# Patient Record
Sex: MALE | Race: WHITE | NOT HISPANIC OR LATINO
[De-identification: names, ages, dates, MRNs, and addresses within clinical notes are randomized per-mention and may not be internally consistent; named-entity substitution may affect disease eponyms.]

---

## 2017-01-06 ENCOUNTER — APPOINTMENT (OUTPATIENT)
Dept: CARDIOLOGY | Facility: CLINIC | Age: 62
End: 2017-01-06

## 2017-01-06 ENCOUNTER — NON-APPOINTMENT (OUTPATIENT)
Age: 62
End: 2017-01-06

## 2017-01-06 VITALS — SYSTOLIC BLOOD PRESSURE: 123 MMHG | DIASTOLIC BLOOD PRESSURE: 83 MMHG | HEART RATE: 88 BPM | OXYGEN SATURATION: 98 %

## 2017-01-06 DIAGNOSIS — Z01.818 ENCOUNTER FOR OTHER PREPROCEDURAL EXAMINATION: ICD-10-CM

## 2017-01-09 ENCOUNTER — OUTPATIENT (OUTPATIENT)
Dept: OUTPATIENT SERVICES | Facility: HOSPITAL | Age: 62
LOS: 1 days | End: 2017-01-09

## 2017-01-09 VITALS
RESPIRATION RATE: 16 BRPM | SYSTOLIC BLOOD PRESSURE: 152 MMHG | WEIGHT: 238.1 LBS | DIASTOLIC BLOOD PRESSURE: 90 MMHG | TEMPERATURE: 98 F | HEART RATE: 88 BPM | HEIGHT: 76 IN

## 2017-01-09 DIAGNOSIS — Z98.890 OTHER SPECIFIED POSTPROCEDURAL STATES: Chronic | ICD-10-CM

## 2017-01-09 DIAGNOSIS — M67.911 UNSPECIFIED DISORDER OF SYNOVIUM AND TENDON, RIGHT SHOULDER: ICD-10-CM

## 2017-01-09 LAB
BUN SERPL-MCNC: 22 MG/DL — SIGNIFICANT CHANGE UP (ref 7–23)
CALCIUM SERPL-MCNC: 9.6 MG/DL — SIGNIFICANT CHANGE UP (ref 8.4–10.5)
CHLORIDE SERPL-SCNC: 101 MMOL/L — SIGNIFICANT CHANGE UP (ref 98–107)
CO2 SERPL-SCNC: 28 MMOL/L — SIGNIFICANT CHANGE UP (ref 22–31)
CREAT SERPL-MCNC: 1 MG/DL — SIGNIFICANT CHANGE UP (ref 0.5–1.3)
GLUCOSE SERPL-MCNC: 100 MG/DL — HIGH (ref 70–99)
HCT VFR BLD CALC: 50.1 % — HIGH (ref 39–50)
HGB BLD-MCNC: 17.5 G/DL — HIGH (ref 13–17)
MCHC RBC-ENTMCNC: 31.9 PG — SIGNIFICANT CHANGE UP (ref 27–34)
MCHC RBC-ENTMCNC: 34.9 % — SIGNIFICANT CHANGE UP (ref 32–36)
MCV RBC AUTO: 91.4 FL — SIGNIFICANT CHANGE UP (ref 80–100)
PLATELET # BLD AUTO: 269 K/UL — SIGNIFICANT CHANGE UP (ref 150–400)
PMV BLD: 10.8 FL — SIGNIFICANT CHANGE UP (ref 7–13)
POTASSIUM SERPL-MCNC: 4.4 MMOL/L — SIGNIFICANT CHANGE UP (ref 3.5–5.3)
POTASSIUM SERPL-SCNC: 4.4 MMOL/L — SIGNIFICANT CHANGE UP (ref 3.5–5.3)
RBC # BLD: 5.48 M/UL — SIGNIFICANT CHANGE UP (ref 4.2–5.8)
RBC # FLD: 13.7 % — SIGNIFICANT CHANGE UP (ref 10.3–14.5)
SODIUM SERPL-SCNC: 142 MMOL/L — SIGNIFICANT CHANGE UP (ref 135–145)
WBC # BLD: 8.03 K/UL — SIGNIFICANT CHANGE UP (ref 3.8–10.5)
WBC # FLD AUTO: 8.03 K/UL — SIGNIFICANT CHANGE UP (ref 3.8–10.5)

## 2017-01-09 NOTE — H&P PST ADULT - HISTORY OF PRESENT ILLNESS
62 y/o  male with PMH: HTN, HLD presents to PST for pre op evaluation with hx of intermittent right shoulder pain x 2 years. Pt was referred to surgeon by PCP. Now scheduled for right shoulder arthroscopy rotator decompression on 01/24/17.

## 2017-01-09 NOTE — H&P PST ADULT - NEGATIVE BREAST SYMPTOMS
no breast lump L/no breast tenderness L/no breast lump R/no nipple discharge L/no breast tenderness R/no nipple discharge R

## 2017-01-09 NOTE — H&P PST ADULT - MUSCULOSKELETAL
details… detailed exam right shoulder/no calf tenderness/diminished strength/no joint swelling/no joint warmth/decreased ROM due to pain

## 2017-01-09 NOTE — H&P PST ADULT - NEGATIVE GASTROINTESTINAL SYMPTOMS
no nausea/no change in bowel habits/no abdominal pain/no melena/no diarrhea/no vomiting/no hiccoughs/no constipation

## 2017-01-09 NOTE — H&P PST ADULT - NEGATIVE GENERAL SYMPTOMS
no weight gain/no chills/no fever/no polydipsia/no weight loss/no polyphagia/no anorexia/no polyuria/no sweating/no fatigue no sweating/no weight gain/no weight loss/no anorexia/no chills/no polydipsia/no polyphagia/no polyuria/no fever/no malaise/no fatigue

## 2017-01-09 NOTE — H&P PST ADULT - NEGATIVE GENERAL GENITOURINARY SYMPTOMS
no gas in urine/no bladder infections/no urine discoloration/no flank pain L/no flank pain R/no hematuria/no renal colic no nocturia/no urinary hesitancy/no bladder infections/no urine discoloration/no renal colic/normal urinary frequency/no flank pain R/no gas in urine/no flank pain L/no hematuria/normal libido/no dysuria

## 2017-01-09 NOTE — H&P PST ADULT - RS GEN PE MLT RESP DETAILS PC
good air movement/no rales/airway patent/no intercostal retractions/no subcutaneous emphysema/respirations non-labored/no wheezes/no rhonchi/breath sounds equal/clear to auscultation bilaterally

## 2017-01-09 NOTE — H&P PST ADULT - NSANTHOSAYNRD_GEN_A_CORE
No. SHAN screening performed.  STOP BANG Legend: 0-2 = LOW Risk; 3-4 = INTERMEDIATE Risk; 5-8 = HIGH Risk

## 2017-01-09 NOTE — H&P PST ADULT - PROBLEM SELECTOR PLAN 1
Scheduled for right shoulder arthroscopy rotator decompression on 01/24/17. Pre op  instructions, famotidine, chlorhexidine gluconate soap given and explained. Pt verbalized understanding.

## 2017-01-09 NOTE — H&P PST ADULT - NEGATIVE OPHTHALMOLOGIC SYMPTOMS
no pain R/no loss of vision R/no loss of vision L/no diplopia/no photophobia/no lacrimation L/no lacrimation R/no blurred vision R/no pain L/no discharge L/no scleral injection L/no blurred vision L/no irritation L/no discharge R/no irritation R no pain R/no loss of vision L/no blurred vision R/no discharge L/no irritation L/no irritation R/no loss of vision R/no scleral injection R/no pain L/no lacrimation R/no scleral injection L/no discharge R/no photophobia/no blurred vision L/no lacrimation L/no diplopia

## 2017-01-09 NOTE — H&P PST ADULT - PMH
HTN (hypertension)    Hyperlipidemia HTN (hypertension)    Hyperlipidemia    Neck mass  posterior  Sprain of other specified parts of unspecified shoulder girdle, initial encounter    Unspecified disorder of synovium and tendon, right shoulder

## 2017-01-09 NOTE — H&P PST ADULT - NEGATIVE CARDIOVASCULAR SYMPTOMS
no claudication/no chest pain/no dyspnea on exertion/no palpitations/no orthopnea/no paroxysmal nocturnal dyspnea/no peripheral edema

## 2017-01-10 ENCOUNTER — TRANSCRIPTION ENCOUNTER (OUTPATIENT)
Age: 62
End: 2017-01-10

## 2017-01-24 ENCOUNTER — APPOINTMENT (OUTPATIENT)
Dept: ORTHOPEDIC SURGERY | Facility: AMBULATORY SURGERY CENTER | Age: 62
End: 2017-01-24

## 2017-01-24 ENCOUNTER — OUTPATIENT (OUTPATIENT)
Dept: OUTPATIENT SERVICES | Facility: HOSPITAL | Age: 62
LOS: 1 days | Discharge: HOME | End: 2017-01-24
Payer: MEDICAID

## 2017-01-24 VITALS
SYSTOLIC BLOOD PRESSURE: 123 MMHG | HEART RATE: 72 BPM | WEIGHT: 238.1 LBS | HEIGHT: 76 IN | DIASTOLIC BLOOD PRESSURE: 87 MMHG | TEMPERATURE: 98 F | RESPIRATION RATE: 16 BRPM | OXYGEN SATURATION: 99 %

## 2017-01-24 VITALS
OXYGEN SATURATION: 99 % | DIASTOLIC BLOOD PRESSURE: 61 MMHG | HEART RATE: 82 BPM | RESPIRATION RATE: 15 BRPM | SYSTOLIC BLOOD PRESSURE: 109 MMHG

## 2017-01-24 DIAGNOSIS — Z98.890 OTHER SPECIFIED POSTPROCEDURAL STATES: Chronic | ICD-10-CM

## 2017-01-24 DIAGNOSIS — M67.911 UNSPECIFIED DISORDER OF SYNOVIUM AND TENDON, RIGHT SHOULDER: ICD-10-CM

## 2017-01-24 PROCEDURE — 29827 SHO ARTHRS SRG RT8TR CUF RPR: CPT

## 2017-01-24 PROCEDURE — 29826 SHO ARTHRS SRG DECOMPRESSION: CPT

## 2017-01-24 RX ORDER — OXYCODONE AND ACETAMINOPHEN 5; 325 MG/1; MG/1
1 TABLET ORAL
Qty: 30 | Refills: 0
Start: 2017-01-24

## 2017-01-24 NOTE — ASU DISCHARGE PLAN (ADULT/PEDIATRIC). - NOTIFY
Unable to Urinate/Swelling that continues/Bleeding that does not stop/Fever greater than 101/Inability to Tolerate Liquids or Foods/Persistent Nausea and Vomiting/Pain not relieved by Medications/Numbness, color, or temperature change to extremity

## 2017-01-24 NOTE — ASU DISCHARGE PLAN (ADULT/PEDIATRIC). - SPECIAL INSTRUCTIONS
Increase fluid and fiber in diet   Take over the counter stool softener for possible constipation with pain medication   Don't take Tylenol with pain meds because there is Tylenol in percocet

## 2017-02-01 ENCOUNTER — APPOINTMENT (OUTPATIENT)
Dept: ORTHOPEDIC SURGERY | Facility: CLINIC | Age: 62
End: 2017-02-01

## 2017-02-01 VITALS — BODY MASS INDEX: 29.22 KG/M2 | WEIGHT: 240 LBS | HEIGHT: 76 IN

## 2017-02-01 PROBLEM — S43.80XA: Chronic | Status: ACTIVE | Noted: 2017-01-09

## 2017-02-01 PROBLEM — M67.911 UNSPECIFIED DISORDER OF SYNOVIUM AND TENDON, RIGHT SHOULDER: Chronic | Status: ACTIVE | Noted: 2017-01-09

## 2017-02-01 PROBLEM — E78.5 HYPERLIPIDEMIA, UNSPECIFIED: Chronic | Status: ACTIVE | Noted: 2017-01-09

## 2017-02-01 PROBLEM — I10 ESSENTIAL (PRIMARY) HYPERTENSION: Chronic | Status: ACTIVE | Noted: 2017-01-09

## 2017-02-01 PROBLEM — R22.1 LOCALIZED SWELLING, MASS AND LUMP, NECK: Chronic | Status: ACTIVE | Noted: 2017-01-09

## 2017-02-02 ENCOUNTER — APPOINTMENT (OUTPATIENT)
Dept: CARDIOLOGY | Facility: CLINIC | Age: 62
End: 2017-02-02

## 2017-02-17 ENCOUNTER — MESSAGE (OUTPATIENT)
Age: 62
End: 2017-02-17

## 2017-03-01 ENCOUNTER — APPOINTMENT (OUTPATIENT)
Dept: ORTHOPEDIC SURGERY | Facility: CLINIC | Age: 62
End: 2017-03-01

## 2017-04-12 ENCOUNTER — APPOINTMENT (OUTPATIENT)
Dept: ORTHOPEDIC SURGERY | Facility: CLINIC | Age: 62
End: 2017-04-12

## 2017-04-12 DIAGNOSIS — M67.911 UNSPECIFIED DISORDER OF SYNOVIUM AND TENDON, RIGHT SHOULDER: ICD-10-CM

## 2017-04-26 ENCOUNTER — MEDICATION RENEWAL (OUTPATIENT)
Age: 62
End: 2017-04-26

## 2017-04-27 ENCOUNTER — RX RENEWAL (OUTPATIENT)
Age: 62
End: 2017-04-27

## 2017-06-12 ENCOUNTER — APPOINTMENT (OUTPATIENT)
Dept: ORTHOPEDIC SURGERY | Facility: CLINIC | Age: 62
End: 2017-06-12

## 2017-08-14 ENCOUNTER — MEDICATION RENEWAL (OUTPATIENT)
Age: 62
End: 2017-08-14

## 2017-09-13 ENCOUNTER — APPOINTMENT (OUTPATIENT)
Dept: CARDIOLOGY | Facility: CLINIC | Age: 62
End: 2017-09-13
Payer: MEDICAID

## 2017-09-13 ENCOUNTER — NON-APPOINTMENT (OUTPATIENT)
Age: 62
End: 2017-09-13

## 2017-09-13 VITALS
SYSTOLIC BLOOD PRESSURE: 102 MMHG | OXYGEN SATURATION: 97 % | HEIGHT: 76 IN | DIASTOLIC BLOOD PRESSURE: 70 MMHG | HEART RATE: 75 BPM

## 2017-09-13 PROCEDURE — 99407 BEHAV CHNG SMOKING > 10 MIN: CPT

## 2017-09-13 PROCEDURE — 93000 ELECTROCARDIOGRAM COMPLETE: CPT

## 2017-09-13 PROCEDURE — 99215 OFFICE O/P EST HI 40 MIN: CPT | Mod: 25

## 2017-09-13 RX ORDER — IBUPROFEN 600 MG/1
600 TABLET, FILM COATED ORAL EVERY 8 HOURS
Qty: 21 | Refills: 0 | Status: DISCONTINUED | COMMUNITY
Start: 2017-01-27 | End: 2017-09-13

## 2017-09-22 ENCOUNTER — APPOINTMENT (OUTPATIENT)
Dept: CARDIOLOGY | Facility: CLINIC | Age: 62
End: 2017-09-22
Payer: MEDICAID

## 2017-09-22 PROCEDURE — 93978 VASCULAR STUDY: CPT

## 2017-11-28 ENCOUNTER — NON-APPOINTMENT (OUTPATIENT)
Age: 62
End: 2017-11-28

## 2017-11-28 ENCOUNTER — APPOINTMENT (OUTPATIENT)
Dept: CARDIOLOGY | Facility: CLINIC | Age: 62
End: 2017-11-28
Payer: MEDICAID

## 2017-11-28 VITALS
SYSTOLIC BLOOD PRESSURE: 140 MMHG | HEART RATE: 73 BPM | DIASTOLIC BLOOD PRESSURE: 90 MMHG | WEIGHT: 242 LBS | BODY MASS INDEX: 29.47 KG/M2 | OXYGEN SATURATION: 99 % | HEIGHT: 76 IN

## 2017-11-28 VITALS — DIASTOLIC BLOOD PRESSURE: 78 MMHG | SYSTOLIC BLOOD PRESSURE: 124 MMHG

## 2017-11-28 DIAGNOSIS — Z01.810 ENCOUNTER FOR PREPROCEDURAL CARDIOVASCULAR EXAMINATION: ICD-10-CM

## 2017-11-28 PROCEDURE — 99215 OFFICE O/P EST HI 40 MIN: CPT | Mod: 25

## 2017-11-28 PROCEDURE — 93000 ELECTROCARDIOGRAM COMPLETE: CPT | Mod: 59

## 2017-11-28 PROCEDURE — 99407 BEHAV CHNG SMOKING > 10 MIN: CPT

## 2017-12-05 ENCOUNTER — FORM ENCOUNTER (OUTPATIENT)
Age: 62
End: 2017-12-05

## 2017-12-06 ENCOUNTER — OUTPATIENT (OUTPATIENT)
Dept: OUTPATIENT SERVICES | Facility: HOSPITAL | Age: 62
LOS: 1 days | End: 2017-12-06
Payer: MEDICAID

## 2017-12-06 ENCOUNTER — APPOINTMENT (OUTPATIENT)
Dept: CT IMAGING | Facility: CLINIC | Age: 62
End: 2017-12-06
Payer: MEDICAID

## 2017-12-06 DIAGNOSIS — Z98.890 OTHER SPECIFIED POSTPROCEDURAL STATES: Chronic | ICD-10-CM

## 2017-12-06 DIAGNOSIS — F17.200 NICOTINE DEPENDENCE, UNSPECIFIED, UNCOMPLICATED: ICD-10-CM

## 2017-12-06 PROCEDURE — G0297: CPT | Mod: 26

## 2017-12-06 PROCEDURE — G0297: CPT

## 2018-05-09 ENCOUNTER — NON-APPOINTMENT (OUTPATIENT)
Age: 63
End: 2018-05-09

## 2018-05-09 ENCOUNTER — APPOINTMENT (OUTPATIENT)
Dept: CARDIOLOGY | Facility: CLINIC | Age: 63
End: 2018-05-09
Payer: MEDICAID

## 2018-05-09 VITALS
BODY MASS INDEX: 30.08 KG/M2 | WEIGHT: 247 LBS | HEIGHT: 76 IN | HEART RATE: 75 BPM | OXYGEN SATURATION: 98 % | DIASTOLIC BLOOD PRESSURE: 75 MMHG | SYSTOLIC BLOOD PRESSURE: 118 MMHG

## 2018-05-09 DIAGNOSIS — Z13.6 ENCOUNTER FOR SCREENING FOR CARDIOVASCULAR DISORDERS: ICD-10-CM

## 2018-05-09 PROCEDURE — 99407 BEHAV CHNG SMOKING > 10 MIN: CPT

## 2018-05-09 PROCEDURE — 99214 OFFICE O/P EST MOD 30 MIN: CPT | Mod: 25

## 2018-05-09 PROCEDURE — 93000 ELECTROCARDIOGRAM COMPLETE: CPT

## 2018-05-23 ENCOUNTER — APPOINTMENT (OUTPATIENT)
Dept: CARDIOLOGY | Facility: CLINIC | Age: 63
End: 2018-05-23

## 2018-08-10 ENCOUNTER — APPOINTMENT (OUTPATIENT)
Dept: CARDIOLOGY | Facility: CLINIC | Age: 63
End: 2018-08-10
Payer: MEDICAID

## 2018-08-10 PROCEDURE — 93306 TTE W/DOPPLER COMPLETE: CPT

## 2018-08-10 PROCEDURE — 93978 VASCULAR STUDY: CPT

## 2018-08-15 ENCOUNTER — APPOINTMENT (OUTPATIENT)
Dept: CARDIOLOGY | Facility: CLINIC | Age: 63
End: 2018-08-15
Payer: MEDICAID

## 2018-08-15 PROCEDURE — 93320 DOPPLER ECHO COMPLETE: CPT

## 2018-08-15 PROCEDURE — 93351 STRESS TTE COMPLETE: CPT

## 2018-08-15 PROCEDURE — 0439T MYOCRD CONTRAST PRFUJ ECHO: CPT | Mod: 59

## 2018-08-15 PROCEDURE — 93352 ADMIN ECG CONTRAST AGENT: CPT

## 2018-09-14 ENCOUNTER — APPOINTMENT (OUTPATIENT)
Dept: CARDIOLOGY | Facility: CLINIC | Age: 63
End: 2018-09-14

## 2018-10-30 ENCOUNTER — APPOINTMENT (OUTPATIENT)
Dept: CARDIOLOGY | Facility: CLINIC | Age: 63
End: 2018-10-30
Payer: MEDICAID

## 2018-10-30 ENCOUNTER — NON-APPOINTMENT (OUTPATIENT)
Age: 63
End: 2018-10-30

## 2018-10-30 VITALS
HEIGHT: 76 IN | OXYGEN SATURATION: 98 % | WEIGHT: 240 LBS | DIASTOLIC BLOOD PRESSURE: 76 MMHG | HEART RATE: 85 BPM | BODY MASS INDEX: 29.22 KG/M2 | SYSTOLIC BLOOD PRESSURE: 117 MMHG

## 2018-10-30 PROCEDURE — 99407 BEHAV CHNG SMOKING > 10 MIN: CPT

## 2018-10-30 PROCEDURE — 99215 OFFICE O/P EST HI 40 MIN: CPT | Mod: 25

## 2018-10-30 PROCEDURE — 93000 ELECTROCARDIOGRAM COMPLETE: CPT | Mod: 59

## 2019-03-28 ENCOUNTER — TRANSCRIPTION ENCOUNTER (OUTPATIENT)
Age: 64
End: 2019-03-28

## 2019-06-06 ENCOUNTER — RX RENEWAL (OUTPATIENT)
Age: 64
End: 2019-06-06

## 2019-07-16 ENCOUNTER — APPOINTMENT (OUTPATIENT)
Dept: CARDIOLOGY | Facility: CLINIC | Age: 64
End: 2019-07-16

## 2019-10-21 ENCOUNTER — MEDICATION RENEWAL (OUTPATIENT)
Age: 64
End: 2019-10-21

## 2019-11-08 ENCOUNTER — APPOINTMENT (OUTPATIENT)
Dept: CARDIOLOGY | Facility: CLINIC | Age: 64
End: 2019-11-08
Payer: MEDICAID

## 2019-11-08 ENCOUNTER — NON-APPOINTMENT (OUTPATIENT)
Age: 64
End: 2019-11-08

## 2019-11-08 VITALS
HEART RATE: 70 BPM | BODY MASS INDEX: 29.22 KG/M2 | OXYGEN SATURATION: 100 % | WEIGHT: 240 LBS | HEIGHT: 76 IN | SYSTOLIC BLOOD PRESSURE: 129 MMHG | DIASTOLIC BLOOD PRESSURE: 82 MMHG

## 2019-11-08 PROCEDURE — 99407 BEHAV CHNG SMOKING > 10 MIN: CPT

## 2019-11-08 PROCEDURE — 93000 ELECTROCARDIOGRAM COMPLETE: CPT

## 2019-11-08 PROCEDURE — 99215 OFFICE O/P EST HI 40 MIN: CPT | Mod: 25

## 2019-11-08 NOTE — ADDENDUM
[FreeTextEntry1] : Mr. Virgen is "ok" to proceed for surgery without carotid Duplex.  \par No further cardiovascular work up is needed prior to the surgery.\par thank you\par Luis F Hernandes MD 1/20/2017 7:54 pm

## 2019-11-08 NOTE — CARDIOLOGY SUMMARY
[No Ischemia] : no Ischemia [No Symptoms] : no Symptoms [LVEF ___%] : LVEF [unfilled]% [___] : [unfilled] [Normal] : normal LA size [None] : no pulmonary hypertension [Mild] : mild mitral regurgitation [de-identified] : carotid Dupelx: 2017: moderate ahtersclerosis. \par \par Aug 2018: AAA screening: No aneurysm. mild plaque.

## 2019-11-08 NOTE — PHYSICAL EXAM
[General Appearance - Well Developed] : well developed [Normal Appearance] : normal appearance [Well Groomed] : well groomed [No Deformities] : no deformities [General Appearance - Well Nourished] : well nourished [General Appearance - In No Acute Distress] : no acute distress [Eyelids - No Xanthelasma] : the eyelids demonstrated no xanthelasmas [Normal Conjunctiva] : the conjunctiva exhibited no abnormalities [Normal Oral Mucosa] : normal oral mucosa [No Oral Pallor] : no oral pallor [No Oral Cyanosis] : no oral cyanosis [Normal Jugular Venous A Waves Present] : normal jugular venous A waves present [Normal Jugular Venous V Waves Present] : normal jugular venous V waves present [No Jugular Venous Donato A Waves] : no jugular venous donato A waves [Respiration, Rhythm And Depth] : normal respiratory rhythm and effort [Exaggerated Use Of Accessory Muscles For Inspiration] : no accessory muscle use [Heart Rate And Rhythm] : heart rate and rhythm were normal [Auscultation Breath Sounds / Voice Sounds] : lungs were clear to auscultation bilaterally [Heart Sounds] : normal S1 and S2 [Abdomen Soft] : soft [Abdomen Tenderness] : non-tender [Abdomen Mass (___ Cm)] : no abdominal mass palpated [Abnormal Walk] : normal gait [Gait - Sufficient For Exercise Testing] : the gait was sufficient for exercise testing [Nail Clubbing] : no clubbing of the fingernails [Cyanosis, Localized] : no localized cyanosis [Petechial Hemorrhages (___cm)] : no petechial hemorrhages [Skin Color & Pigmentation] : normal skin color and pigmentation [] : no rash [No Venous Stasis] : no venous stasis [Skin Lesions] : no skin lesions [No Skin Ulcers] : no skin ulcer [No Xanthoma] : no  xanthoma was observed [FreeTextEntry1] : sebaceous cyst on the neck posteriorly.  [Affect] : the affect was normal [Oriented To Time, Place, And Person] : oriented to person, place, and time [Mood] : the mood was normal [No Anxiety] : not feeling anxious

## 2019-11-08 NOTE — REASON FOR VISIT
[Follow-Up - Clinic] : a clinic follow-up of [FreeTextEntry2] : carotid disease, non obstructive CAD,  [FreeTextEntry1] : carotid disease, non obstructive CAD,

## 2019-11-08 NOTE — HISTORY OF PRESENT ILLNESS
[FreeTextEntry1] : F/u:  HTN, Non obstructive CAD, HLD, smoking addition, \par HPI for today: : feels good. Still smokes 1 pack a day,   he went back to 1 pack a day\par No chest pain. no headaches. No dizziness. no dyspnea. \par \par \par old note: cut down to 4-5 /day. Changing his habits related to smoking,.\par No chest pain. + cough. Bronchnitis symptoms.\par complains of dizziness. \par \par \par \par old note: smoking is less. but still smokes.  its 10-13 day. \par No chest pain. + dizziness when he stands up quickly. \par Some dyspnea on exertion. but similar exercsie capacity. \par \par old note: Recent right shoulder surery done.  tolerated surgery well. 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 patient states his BP has been always in normal range throughout his life.

## 2019-11-08 NOTE — DISCUSSION/SUMMARY
[Patient] : the patient [Risks] : risks [Benefits] : benefits [___ Month(s)] : [unfilled] month(s) [With Me] : with me [FreeTextEntry1] : This is a 59 M with h/o dyslipidemia, smoking, recent diagnosis of hypertension, complains of dyspnea on exertion.\par 1) HTN with LVH and grade 1 DD.: Likely essential. controlled Continue diet and exercise.  ct valsartan- HCTZ  to 80-12.5 \par 2) Non obstructive CAD: ASA 81 and lipitor 20 mg . next visit Lipid panel. carotid duplex\par 3) moderate atherosclerosis of carotid: ASA  and  statins. \par 4) AAA screening: Smoking. 40 pack year history:  1 pack a day since age 18 , US aorta: mild diffuse heterogenous plaque. no dilatiomn. \par 5) Smoking cessation:  still smoking. trying to quit. not wearing patches.   time spent > 10 mins Smoking cessation counselling addressed. Quit date not set. .  Patient motivated. Offered patches and/or meds. will start wellbutrins 100 mg > f/u in 3-4 weeks to assess imporvement. if do0es not tolerate then stop it and f/u in 9 mths. \par 6)  mild aortic stensois. \par

## 2019-11-27 ENCOUNTER — MEDICATION RENEWAL (OUTPATIENT)
Age: 64
End: 2019-11-27

## 2019-12-06 ENCOUNTER — MEDICATION RENEWAL (OUTPATIENT)
Age: 64
End: 2019-12-06

## 2019-12-13 ENCOUNTER — APPOINTMENT (OUTPATIENT)
Dept: CARDIOLOGY | Facility: CLINIC | Age: 64
End: 2019-12-13

## 2020-01-11 ENCOUNTER — RX RENEWAL (OUTPATIENT)
Age: 65
End: 2020-01-11

## 2020-08-15 ENCOUNTER — TRANSCRIPTION ENCOUNTER (OUTPATIENT)
Age: 65
End: 2020-08-15

## 2020-11-02 ENCOUNTER — RX RENEWAL (OUTPATIENT)
Age: 65
End: 2020-11-02

## 2021-01-06 NOTE — ASU PREOPERATIVE ASSESSMENT, ADULT ([facility] ONLY) - RESPIRATORY RATE (BREATHS/MIN)
"Chief Complaint   Patient presents with   • Follow-up   • Sleeping Problem         Subjective   Myke Marcial is a 54 y.o. male.     History of Present Illness   Patient comes back today for follow up of Obstructive Sleep apnea. he doesn't report any issues with the device or mask.     Patient says that he is compliant with his device and using it regularly.    Patient's symptoms of sleep disturbance has been helped some however this time of the year he always seems to have more difficulty sleeping due to just not being able to shut his mind off.    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    He does feel like the increased pressure from 12-14 has helped.  He uses a full facemask.      The following portions of the patient's history were reviewed and updated as appropriate: allergies, current medications, past family history, past medical history, past social history and past surgical history.    Review of Systems   Constitutional: Negative for chills and fever.   HENT: Negative for rhinorrhea, sinus pressure, sneezing and sore throat.    Respiratory: Positive for cough (occasionally when he first wakes up ). Negative for chest tightness, shortness of breath and wheezing.    Psychiatric/Behavioral: Positive for sleep disturbance.       Objective   Visit Vitals  /86   Pulse 96   Temp 97.3 °F (36.3 °C)   Resp 16   Ht 175.3 cm (69\")   Wt 122 kg (270 lb)   SpO2 97%   BMI 39.87 kg/m²         Physical Exam  Vitals signs reviewed.   Constitutional:       Appearance: He is well-developed.   HENT:      Head: Atraumatic.      Mouth/Throat:      Mouth: Mucous membranes are moist.      Comments: Crowded oropharynx.  Eyes:      Extraocular Movements: Extraocular movements intact.   Abdominal:      Comments: Obese abdomen.   Musculoskeletal:      Comments: Gait normal.   Neurological:      Mental " Status: He is alert and oriented to person, place, and time.           Assessment/Plan   Diagnoses and all orders for this visit:    1. Obstructive sleep apnea (Primary)    2. Excessive daytime sleepiness    3. Obesity (BMI 30-39.9)    4. Poor sleep hygiene           Return in about 10 years (around 1/6/2031) for Recheck, For Dr. Nazario, Sleep ONLY.    DISCUSSION (if any):  Continue treatment with CPAP at a pressure of 14, with a full-face mask.    Patient's compliance data was reviewed and he is not compliant at this time.     Humidification setup, hose and mask care discussed.    Weight loss advised.    Use every night for at least 4 hours stressed.      Dictated utilizing Dragon dictation.    This document was electronically signed by TAE Roger January 6, 2021  13:30 EST    16

## 2021-01-22 ENCOUNTER — APPOINTMENT (OUTPATIENT)
Dept: CARDIOLOGY | Facility: CLINIC | Age: 66
End: 2021-01-22
Payer: MEDICARE

## 2021-01-22 ENCOUNTER — NON-APPOINTMENT (OUTPATIENT)
Age: 66
End: 2021-01-22

## 2021-01-22 VITALS — DIASTOLIC BLOOD PRESSURE: 81 MMHG | SYSTOLIC BLOOD PRESSURE: 119 MMHG

## 2021-01-22 VITALS — SYSTOLIC BLOOD PRESSURE: 135 MMHG | DIASTOLIC BLOOD PRESSURE: 85 MMHG

## 2021-01-22 VITALS — HEART RATE: 77 BPM | SYSTOLIC BLOOD PRESSURE: 143 MMHG | DIASTOLIC BLOOD PRESSURE: 81 MMHG | OXYGEN SATURATION: 97 %

## 2021-01-22 PROCEDURE — 99215 OFFICE O/P EST HI 40 MIN: CPT

## 2021-01-22 PROCEDURE — 93000 ELECTROCARDIOGRAM COMPLETE: CPT

## 2021-01-22 PROCEDURE — 99407 BEHAV CHNG SMOKING > 10 MIN: CPT

## 2021-01-22 PROCEDURE — 99072 ADDL SUPL MATRL&STAF TM PHE: CPT

## 2021-01-22 RX ORDER — NICOTINE TRANSDERMAL SYSTEM 7 MG/24H
7 PATCH, EXTENDED RELEASE TRANSDERMAL DAILY
Qty: 30 | Refills: 0 | Status: DISCONTINUED | COMMUNITY
Start: 2017-01-06 | End: 2021-01-22

## 2021-01-22 RX ORDER — NICOTINE TRANSDERMAL SYSTEM 14 MG/24H
14 PATCH, EXTENDED RELEASE TRANSDERMAL DAILY
Qty: 14 | Refills: 0 | Status: DISCONTINUED | COMMUNITY
Start: 2017-01-06 | End: 2021-01-22

## 2021-01-22 RX ORDER — BUPROPION HYDROCHLORIDE 100 MG/1
100 TABLET, FILM COATED, EXTENDED RELEASE ORAL DAILY
Qty: 90 | Refills: 2 | Status: DISCONTINUED | COMMUNITY
Start: 2019-11-08 | End: 2021-01-22

## 2021-01-22 NOTE — DISCUSSION/SUMMARY
[Patient] : the patient [Risks] : risks [Benefits] : benefits [___ Month(s)] : [unfilled] month(s) [With Me] : with me [FreeTextEntry1] : This is a 59 M with h/o dyslipidemia, smoking, recent diagnosis of hypertension, complains of dyspnea on exertion.\par 1) HTN with LVH and grade 1 DD.: Likely essential. controlled Continue diet and exercise.  ct valsartan- HCTZ  to 80-12.5  1 week blood pressure readnio and reassess for incresing meds. \par 2) Non obstructive CAD: but new LBBB : nuclear stress test.  ASA 81 and lipitor 20 mg .  \par 3) moderate atherosclerosis of carotid: ASA  and  statins.  repeat US carotid Artery. \par 4) AAA screening:   US aorta: mild diffuse heterogenous plaque. no dilatiomn. \par 5) Smoking cessation:  still smoking. trying to quit. not wearing patches.   time spent > 10 mins Smoking cessation counselling addressed. Quit date not set. .  Patient motivated. Offered patches and/or meds. will   f/u in 3-4  next visit. \par 6)  mild aortic stensois. : repeat echo. \par \par \par

## 2021-01-22 NOTE — CARDIOLOGY SUMMARY
[No Ischemia] : no Ischemia [No Symptoms] : no Symptoms [LVEF ___%] : LVEF [unfilled]% [___] : [unfilled] [None] : no pulmonary hypertension [Normal] : normal LA size [Mild] : mild mitral regurgitation [de-identified] : carotid Dupelx: 2017: moderate ahtersclerosis. \par \par Aug 2018: AAA screening: No aneurysm. mild plaque.

## 2021-01-22 NOTE — HISTORY OF PRESENT ILLNESS
[FreeTextEntry1] : F/u:  HTN, Non obstructive CAD, HLD, smoking addition, \par \par HPI for today: :  jayjay good. complaitn with meds.  he smokes 5-6 a day. he tries to eat healthy food.  he is phsycially activie\par chronic condictions: hypertension : controlled. \par coronary artery disease : stable. \par \par old note: : feels good. Still smokes 1 pack a day,   he went back to 1 pack a day\par No chest pain. no headaches. No dizziness. no dyspnea. \par \par \par old note: cut down to 4-5 /day. Changing his habits related to smoking,.\par No chest pain. + cough. Bronchnitis symptoms.\par complains of dizziness. \par \par \par \par old note: smoking is less. but still smokes.  its 10-13 day. \par No chest pain. + dizziness when he stands up quickly. \par Some dyspnea on exertion. but similar exercsie capacity. \par \par old note: Recent right shoulder surery done.  tolerated surgery well. 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 patient states his BP has been always in normal range throughout his life.

## 2021-01-22 NOTE — PHYSICAL EXAM
[General Appearance - Well Developed] : well developed [Normal Appearance] : normal appearance [Well Groomed] : well groomed [General Appearance - Well Nourished] : well nourished [No Deformities] : no deformities [General Appearance - In No Acute Distress] : no acute distress [Normal Conjunctiva] : the conjunctiva exhibited no abnormalities [Eyelids - No Xanthelasma] : the eyelids demonstrated no xanthelasmas [Normal Oral Mucosa] : normal oral mucosa [No Oral Pallor] : no oral pallor [No Oral Cyanosis] : no oral cyanosis [Normal Jugular Venous A Waves Present] : normal jugular venous A waves present [Normal Jugular Venous V Waves Present] : normal jugular venous V waves present [No Jugular Venous Donato A Waves] : no jugular venous donato A waves [Respiration, Rhythm And Depth] : normal respiratory rhythm and effort [Exaggerated Use Of Accessory Muscles For Inspiration] : no accessory muscle use [Auscultation Breath Sounds / Voice Sounds] : lungs were clear to auscultation bilaterally [Heart Rate And Rhythm] : heart rate and rhythm were normal [Heart Sounds] : normal S1 and S2 [Abdomen Tenderness] : non-tender [Abdomen Soft] : soft [Abdomen Mass (___ Cm)] : no abdominal mass palpated [Abnormal Walk] : normal gait [Gait - Sufficient For Exercise Testing] : the gait was sufficient for exercise testing [Nail Clubbing] : no clubbing of the fingernails [Cyanosis, Localized] : no localized cyanosis [Petechial Hemorrhages (___cm)] : no petechial hemorrhages [Skin Color & Pigmentation] : normal skin color and pigmentation [] : no rash [No Venous Stasis] : no venous stasis [Skin Lesions] : no skin lesions [No Skin Ulcers] : no skin ulcer [No Xanthoma] : no  xanthoma was observed [FreeTextEntry1] : sebaceous cyst on the neck posteriorly.  [Oriented To Time, Place, And Person] : oriented to person, place, and time [Affect] : the affect was normal [Mood] : the mood was normal [No Anxiety] : not feeling anxious

## 2021-02-18 ENCOUNTER — APPOINTMENT (OUTPATIENT)
Dept: CARDIOLOGY | Facility: CLINIC | Age: 66
End: 2021-02-18
Payer: MEDICARE

## 2021-02-18 PROCEDURE — 93880 EXTRACRANIAL BILAT STUDY: CPT

## 2021-02-18 PROCEDURE — 93306 TTE W/DOPPLER COMPLETE: CPT

## 2021-02-18 PROCEDURE — 99072 ADDL SUPL MATRL&STAF TM PHE: CPT

## 2021-02-23 ENCOUNTER — APPOINTMENT (OUTPATIENT)
Dept: CARDIOLOGY | Facility: CLINIC | Age: 66
End: 2021-02-23
Payer: MEDICARE

## 2021-02-23 PROCEDURE — 93015 CV STRESS TEST SUPVJ I&R: CPT

## 2021-02-23 PROCEDURE — A9500: CPT

## 2021-02-23 PROCEDURE — 78452 HT MUSCLE IMAGE SPECT MULT: CPT

## 2021-02-23 PROCEDURE — 99072 ADDL SUPL MATRL&STAF TM PHE: CPT

## 2021-02-25 ENCOUNTER — TRANSCRIPTION ENCOUNTER (OUTPATIENT)
Age: 66
End: 2021-02-25

## 2021-03-19 NOTE — ASU DISCHARGE PLAN (ADULT/PEDIATRIC). - DIET
Please call the patient with the results. Notify that I would recommend follow-up spine ortho due to marrow inflammation at T11-T12 and possibility of ankylosing spondylitis which is an inflammatory arthritis. I would recommend checking labs to try to help confirm this. Lab order placed. Further work-up and management as dictated by results.    Drink plenty fluids/progress slowly

## 2021-07-28 ENCOUNTER — APPOINTMENT (OUTPATIENT)
Dept: CARDIOLOGY | Facility: CLINIC | Age: 66
End: 2021-07-28
Payer: MEDICARE

## 2021-07-28 VITALS
OXYGEN SATURATION: 98 % | DIASTOLIC BLOOD PRESSURE: 67 MMHG | WEIGHT: 239 LBS | TEMPERATURE: 98.3 F | HEIGHT: 76 IN | RESPIRATION RATE: 18 BRPM | BODY MASS INDEX: 29.1 KG/M2 | SYSTOLIC BLOOD PRESSURE: 122 MMHG | HEART RATE: 67 BPM

## 2021-07-28 PROCEDURE — 99072 ADDL SUPL MATRL&STAF TM PHE: CPT

## 2021-07-28 PROCEDURE — 93000 ELECTROCARDIOGRAM COMPLETE: CPT

## 2021-07-28 PROCEDURE — 99215 OFFICE O/P EST HI 40 MIN: CPT

## 2021-07-29 ENCOUNTER — RX CHANGE (OUTPATIENT)
Age: 66
End: 2021-07-29

## 2021-07-29 NOTE — DISCUSSION/SUMMARY
[Patient] : the patient [Risks] : risks [Benefits] : benefits [With Me] : with me [___ Month(s)] : in [unfilled] month(s) [FreeTextEntry1] : This is a 59 M with h/o dyslipidemia, smoking, recent diagnosis of hypertension, complains of dyspnea on exertion.\par 1) HTN with LVH and grade 1 DD.: Likely essential. controlled Continue diet and exercise.  ct valsartan- HCTZ  to 80-12.5   \par 2) Non obstructive CAD: but new LBBB : nuclear stress test.  ASA 81 and lipitor 20 mg .  \par 3) moderate atherosclerosis of carotid: ASA  and  statins.  repeat US carotid Artery. \par 4) AAA screening:   US aorta: mild diffuse heterogenous plaque. no dilatiomn. \par 5) Smoking cessation:  still smoking. trying to quit. not wearing patches.   time spent > 10 mins Smoking cessation counselling addressed. Quit date not set. .  Patient motivated. Offered patches and/or meds. will   f/u in 3-4  next visit. \par 6)  mild aortic stensois. : repeat echo. \par az\par \par \par

## 2021-07-29 NOTE — REASON FOR VISIT
[Follow-Up - Clinic] : a clinic follow-up of [FreeTextEntry1] : carotid disease, non obstructive CAD,  [FreeTextEntry2] : carotid disease, non obstructive CAD,

## 2021-07-29 NOTE — HISTORY OF PRESENT ILLNESS
[FreeTextEntry1] : F/u:  HTN, Non obstructive CAD, HLD, smoking addition,  \par \par HPI for today: : he is still smoking. 1 pack a day a he is eating healthy. some dizziness when he gets up quickly.\par cramps in the legs.\par \par old noteL:  :  fels good. complaitn with meds.  he smokes 5-6 a day. he tries to eat healthy food.  he is phsycially activie\par chronic condictions: hypertension : controlled. \par coronary artery disease : stable. \par \par old note: : feels good. Still smokes 1 pack a day,   he went back to 1 pack a day\par No chest pain. no headaches. No dizziness. no dyspnea. \par \par \par old note: cut down to 4-5 /day. Changing his habits related to smoking,.\par No chest pain. + cough. Bronchnitis symptoms.\par complains of dizziness. \par \par \par \par old note: smoking is less. but still smokes.  its 10-13 day. \par No chest pain. + dizziness when he stands up quickly. \par Some dyspnea on exertion. but similar exercsie capacity. \par \par old note: Recent right shoulder surery done.  tolerated surgery well. 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 patient states his BP has been always in normal range throughout his life.

## 2021-07-29 NOTE — PHYSICAL EXAM
[General Appearance - Well Developed] : well developed [Normal Appearance] : normal appearance [Well Groomed] : well groomed [General Appearance - Well Nourished] : well nourished [No Deformities] : no deformities [General Appearance - In No Acute Distress] : no acute distress [Normal Conjunctiva] : the conjunctiva exhibited no abnormalities [Eyelids - No Xanthelasma] : the eyelids demonstrated no xanthelasmas [Normal Oral Mucosa] : normal oral mucosa [No Oral Pallor] : no oral pallor [Normal Jugular Venous A Waves Present] : normal jugular venous A waves present [No Oral Cyanosis] : no oral cyanosis [Normal Jugular Venous V Waves Present] : normal jugular venous V waves present [No Jugular Venous Donato A Waves] : no jugular venous donato A waves [Respiration, Rhythm And Depth] : normal respiratory rhythm and effort [Auscultation Breath Sounds / Voice Sounds] : lungs were clear to auscultation bilaterally [Exaggerated Use Of Accessory Muscles For Inspiration] : no accessory muscle use [Heart Rate And Rhythm] : heart rate and rhythm were normal [Heart Sounds] : normal S1 and S2 [Abdomen Soft] : soft [Abdomen Tenderness] : non-tender [Abdomen Mass (___ Cm)] : no abdominal mass palpated [Abnormal Walk] : normal gait [Gait - Sufficient For Exercise Testing] : the gait was sufficient for exercise testing [Nail Clubbing] : no clubbing of the fingernails [Cyanosis, Localized] : no localized cyanosis [Petechial Hemorrhages (___cm)] : no petechial hemorrhages [Skin Color & Pigmentation] : normal skin color and pigmentation [] : no rash [No Venous Stasis] : no venous stasis [Skin Lesions] : no skin lesions [No Skin Ulcers] : no skin ulcer [No Xanthoma] : no  xanthoma was observed [Oriented To Time, Place, And Person] : oriented to person, place, and time [Mood] : the mood was normal [Affect] : the affect was normal [No Anxiety] : not feeling anxious [FreeTextEntry1] : sebaceous cyst on the neck posteriorly.

## 2021-07-29 NOTE — CARDIOLOGY SUMMARY
[No Ischemia] : no Ischemia [No Symptoms] : no Symptoms [LVEF ___%] : LVEF [unfilled]% [___] : [unfilled] [None] : no pulmonary hypertension [Normal] : normal LA size [Mild] : mild mitral regurgitation [de-identified] : 7/28/2021  [de-identified] : 2/2021: No ischemia. Nuclear dann stress echo.  [de-identified] : feb 2021:  Normal LVef 55-60%.  aortic calcification. mild aortic  stenosis.  [de-identified] : Carotid DUplex: moderate atherosclerosis. No stenosis.  [de-identified] : carotid Dupelx: 2017: moderate ahtersclerosis. \par \par Aug 2018: AAA screening: No aneurysm. mild plaque.

## 2021-08-25 ENCOUNTER — APPOINTMENT (OUTPATIENT)
Dept: CARDIOLOGY | Facility: CLINIC | Age: 66
End: 2021-08-25

## 2021-10-31 ENCOUNTER — RX CHANGE (OUTPATIENT)
Age: 66
End: 2021-10-31

## 2022-01-26 ENCOUNTER — NON-APPOINTMENT (OUTPATIENT)
Age: 67
End: 2022-01-26

## 2022-01-26 ENCOUNTER — APPOINTMENT (OUTPATIENT)
Dept: CARDIOLOGY | Facility: CLINIC | Age: 67
End: 2022-01-26
Payer: MEDICARE

## 2022-01-26 VITALS
DIASTOLIC BLOOD PRESSURE: 73 MMHG | RESPIRATION RATE: 17 BRPM | BODY MASS INDEX: 29.22 KG/M2 | SYSTOLIC BLOOD PRESSURE: 132 MMHG | OXYGEN SATURATION: 95 % | TEMPERATURE: 98 F | HEIGHT: 76 IN | WEIGHT: 240 LBS | HEART RATE: 74 BPM

## 2022-01-26 PROCEDURE — 93000 ELECTROCARDIOGRAM COMPLETE: CPT

## 2022-01-26 PROCEDURE — 99215 OFFICE O/P EST HI 40 MIN: CPT

## 2022-01-26 RX ORDER — SILDENAFIL 50 MG/1
50 TABLET ORAL
Qty: 5 | Refills: 5 | Status: DISCONTINUED | COMMUNITY
Start: 2021-07-28 | End: 2022-01-26

## 2022-05-26 ENCOUNTER — NON-APPOINTMENT (OUTPATIENT)
Age: 67
End: 2022-05-26

## 2022-07-07 ENCOUNTER — NON-APPOINTMENT (OUTPATIENT)
Age: 67
End: 2022-07-07

## 2022-07-07 ENCOUNTER — APPOINTMENT (OUTPATIENT)
Dept: PULMONOLOGY | Facility: CLINIC | Age: 67
End: 2022-07-07

## 2022-07-07 VITALS
HEIGHT: 76 IN | DIASTOLIC BLOOD PRESSURE: 72 MMHG | SYSTOLIC BLOOD PRESSURE: 130 MMHG | HEART RATE: 77 BPM | WEIGHT: 228 LBS | OXYGEN SATURATION: 97 % | RESPIRATION RATE: 16 BRPM | BODY MASS INDEX: 27.76 KG/M2

## 2022-07-07 DIAGNOSIS — Z86.16 PERSONAL HISTORY OF COVID-19: ICD-10-CM

## 2022-07-07 DIAGNOSIS — Z86.79 PERSONAL HISTORY OF OTHER DISEASES OF THE CIRCULATORY SYSTEM: ICD-10-CM

## 2022-07-07 DIAGNOSIS — Z86.39 PERSONAL HISTORY OF OTHER ENDOCRINE, NUTRITIONAL AND METABOLIC DISEASE: ICD-10-CM

## 2022-07-07 PROCEDURE — 99204 OFFICE O/P NEW MOD 45 MIN: CPT | Mod: 25

## 2022-07-07 PROCEDURE — 99406 BEHAV CHNG SMOKING 3-10 MIN: CPT

## 2022-07-07 PROCEDURE — G0296 VISIT TO DETERM LDCT ELIG: CPT

## 2022-07-07 RX ORDER — LATANOPROST/PF 0.005 %
0.01 DROPS OPHTHALMIC (EYE)
Qty: 2 | Refills: 0 | Status: ACTIVE | COMMUNITY
Start: 2021-08-19

## 2022-07-07 RX ORDER — NIRMATRELVIR AND RITONAVIR 300-100 MG
20 X 150 MG & KIT ORAL
Qty: 30 | Refills: 0 | Status: DISCONTINUED | COMMUNITY
Start: 2022-05-24 | End: 2022-07-07

## 2022-07-07 NOTE — PHYSICAL EXAM
[No Acute Distress] : no acute distress [Well Nourished] : well nourished [Well Developed] : well developed [Normal Appearance] : normal appearance [Supple] : supple

## 2022-07-07 NOTE — CONSULT LETTER
[Dear  ___] : Dear  [unfilled], [Consult Letter:] : I had the pleasure of evaluating your patient, [unfilled]. [Please see my note below.] : Please see my note below. [Consult Closing:] : Thank you very much for allowing me to participate in the care of this patient.  If you have any questions, please do not hesitate to contact me. [Sincerely,] : Sincerely, [FreeTextEntry3] : Siva Evans MD, FCCP, D. ABSM\par Pulmonary and Sleep Medicine\par [facility] [place]  [DrRonna  ___] : Dr. REESE

## 2022-07-07 NOTE — REASON FOR VISIT
[Initial] : an initial visit [Nicotine Dependence] : nicotine dependence [TextBox_44] : Prior Covid infection

## 2022-07-07 NOTE — DISCUSSION/SUMMARY
[FreeTextEntry1] : \par #1. Will schedule PFTs in near future to assess lung function \par #2. The patient does not appear to require chronic BD therapy at this time\par #3. Diet and exercise for weight loss\par #4. SOBOE is likely at least somewhat related to weight or deconditioning \par #5. Chest CT for lung cancer screening given smoking hx. Risks and benefits regarding screening CT discussed including but not limited to the benefit of early lung cancer detection as well as other possible unknown pathology but also risk of discovering nodules or other findings which may be benign but will require periodic evaluation. \par #6. Pt denies significant sleep complaints. \par #7. Pt had both Covid vaccines and booster; s/p Covid infection\par #8. F/u in 2 months with chest CT and PFTs\par #9. Reviewed risks of exposure and symptoms of Covid-19 virus, including how the virus is spread and precautions to avoid vlad virus. \par \par The patient expressed understanding and agreement with the above recommendations/plan and accepts responsibility to be compliant with recommended testing, therapies, and f/u visits.\par All relevant questions and concerns were addressed.

## 2022-07-07 NOTE — HISTORY OF PRESENT ILLNESS
[Current] : current [Never] : never [TextBox_4] : Pt s/p Covid infection 6/2022 when he developed fevers x 2 days, mild cough, body aches. Did not require therapy and improved after 1 week.\par Pt now with improved cough and SOBOE.\par Pt denies significant sleep complaints. \par Follows with cardiology for HTN and HLD.\par  [TextBox_11] : 1 [TextBox_13] : 45

## 2022-07-15 ENCOUNTER — NON-APPOINTMENT (OUTPATIENT)
Age: 67
End: 2022-07-15

## 2022-07-27 ENCOUNTER — APPOINTMENT (OUTPATIENT)
Dept: CARDIOLOGY | Facility: CLINIC | Age: 67
End: 2022-07-27

## 2022-07-27 ENCOUNTER — NON-APPOINTMENT (OUTPATIENT)
Age: 67
End: 2022-07-27

## 2022-07-27 VITALS — SYSTOLIC BLOOD PRESSURE: 128 MMHG | DIASTOLIC BLOOD PRESSURE: 74 MMHG

## 2022-07-27 VITALS
HEIGHT: 76 IN | HEART RATE: 76 BPM | TEMPERATURE: 98.4 F | OXYGEN SATURATION: 99 % | BODY MASS INDEX: 27.64 KG/M2 | WEIGHT: 227 LBS

## 2022-07-27 PROCEDURE — 93000 ELECTROCARDIOGRAM COMPLETE: CPT

## 2022-07-27 PROCEDURE — 99215 OFFICE O/P EST HI 40 MIN: CPT | Mod: 25

## 2022-07-27 RX ORDER — MUPIROCIN 20 MG/G
2 OINTMENT TOPICAL
Qty: 22 | Refills: 0 | Status: DISCONTINUED | COMMUNITY
Start: 2022-03-28 | End: 2022-07-27

## 2022-08-01 ENCOUNTER — RX CHANGE (OUTPATIENT)
Age: 67
End: 2022-08-01

## 2022-08-03 NOTE — PHYSICAL EXAM
[General Appearance - Well Developed] : well developed [Normal Appearance] : normal appearance [Well Groomed] : well groomed [General Appearance - Well Nourished] : well nourished [No Deformities] : no deformities [General Appearance - In No Acute Distress] : no acute distress [Normal Conjunctiva] : the conjunctiva exhibited no abnormalities [Eyelids - No Xanthelasma] : the eyelids demonstrated no xanthelasmas [Normal Oral Mucosa] : normal oral mucosa [No Oral Pallor] : no oral pallor [No Oral Cyanosis] : no oral cyanosis [Normal Jugular Venous A Waves Present] : normal jugular venous A waves present [Normal Jugular Venous V Waves Present] : normal jugular venous V waves present [No Jugular Venous Donato A Waves] : no jugular venous donato A waves [Respiration, Rhythm And Depth] : normal respiratory rhythm and effort [Exaggerated Use Of Accessory Muscles For Inspiration] : no accessory muscle use [Auscultation Breath Sounds / Voice Sounds] : lungs were clear to auscultation bilaterally [Heart Rate And Rhythm] : heart rate and rhythm were normal [Heart Sounds] : normal S1 and S2 [Abdomen Soft] : soft [Abdomen Tenderness] : non-tender [Abdomen Mass (___ Cm)] : no abdominal mass palpated [Abnormal Walk] : normal gait [Gait - Sufficient For Exercise Testing] : the gait was sufficient for exercise testing [Nail Clubbing] : no clubbing of the fingernails [Cyanosis, Localized] : no localized cyanosis [Petechial Hemorrhages (___cm)] : no petechial hemorrhages [Skin Color & Pigmentation] : normal skin color and pigmentation [] : no rash [No Venous Stasis] : no venous stasis [Skin Lesions] : no skin lesions [No Skin Ulcers] : no skin ulcer [No Xanthoma] : no  xanthoma was observed [Oriented To Time, Place, And Person] : oriented to person, place, and time [Affect] : the affect was normal [Mood] : the mood was normal [No Anxiety] : not feeling anxious [FreeTextEntry1] : sebaceous cyst on the neck posteriorly.

## 2022-08-03 NOTE — DISCUSSION/SUMMARY
[Patient] : the patient [Risks] : risks [Benefits] : benefits [With Me] : with me [___ Month(s)] : in [unfilled] month(s) [EKG obtained to assist in diagnosis and management of assessed problem(s)] : EKG obtained to assist in diagnosis and management of assessed problem(s) [FreeTextEntry1] : This is a 66 M with h/o dyslipidemia, smoking, recent diagnosis of hypertension, complains of dyspnea on exertion.\par 1) HTN with LVH and grade 1 DD.: Likely essential. controlled Continue diet and exercise.  ct valsartan- HCTZ  to 80-12.5   \par 2) Non obstructive CAD:   ASA 81 and lipitor 20 mg .  \par 3) moderate atherosclerosis of carotid: ASA  and  statins.  \par 4) AAA screening:   US aorta: mild diffuse heterogenous plaque. no dilation,. \par 5) Smoking cessation:  still smoking. trying to quit \par 6)  mild aortic stensois. : repeat echo next visit. \par Will order and review ECG for the above mentioned diagnosis/condition/symptoms \par \par

## 2022-08-03 NOTE — HISTORY OF PRESENT ILLNESS
[FreeTextEntry1] : F/u:  HTN, Non obstructive CAD, HLD, smoking addition,  \par \par HPI for today: :   he still smoking. half what he did. trying to cut back.  he has been p[hysicallly active. \par no chest pain . \par  \par old note: he is still smoking. 1 pack a day a he is eating healthy. some dizziness when he gets up quickly.\par cramps in the legs.\par \par old noteL:  :  fels good. complaitn with meds.  he smokes 5-6 a day. he tries to eat healthy food.  he is phsycially activie\par chronic condictions: hypertension : controlled. \par coronary artery disease : stable. \par \par old note: : feels good. Still smokes 1 pack a day,   he went back to 1 pack a day\par No chest pain. no headaches. No dizziness. no dyspnea. \par \par \par old note: cut down to 4-5 /day. Changing his habits related to smoking,.\par No chest pain. + cough. Bronchnitis symptoms.\par complains of dizziness. \par \par \par \par old note: smoking is less. but still smokes.  its 10-13 day. \par No chest pain. + dizziness when he stands up quickly. \par Some dyspnea on exertion. but similar exercsie capacity. \par \par old note: Recent right shoulder surery done.  tolerated surgery well. 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 patient states his BP has been always in normal range throughout his life.

## 2022-08-03 NOTE — CARDIOLOGY SUMMARY
[No Ischemia] : no Ischemia [No Symptoms] : no Symptoms [LVEF ___%] : LVEF [unfilled]% [___] : [unfilled] [None] : no pulmonary hypertension [Normal] : normal LA size [Mild] : mild mitral regurgitation [de-identified] : 7 27 2022 Sinus  Rhythm \par -Left bundle branch block. \par \par ABNORMAL \par \par  [de-identified] : 2/2021: No ischemia. Nuclear dann stress echo.  [de-identified] : feb 2021:  Normal LVef 55-60%.  aortic calcification. mild aortic  stenosis.  [de-identified] : Carotid DUplex: moderate atherosclerosis. No stenosis.  [de-identified] : carotid Dupelx: 2017: moderate ahtersclerosis. \par \par Aug 2018: AAA screening: No aneurysm. mild plaque.

## 2022-08-23 ENCOUNTER — NON-APPOINTMENT (OUTPATIENT)
Age: 67
End: 2022-08-23

## 2022-08-23 VITALS — WEIGHT: 227 LBS | BODY MASS INDEX: 27.64 KG/M2 | HEIGHT: 76 IN

## 2022-08-23 DIAGNOSIS — F17.200 NICOTINE DEPENDENCE, UNSPECIFIED, UNCOMPLICATED: ICD-10-CM

## 2022-08-23 DIAGNOSIS — F17.210 NICOTINE DEPENDENCE, CIGARETTES, UNCOMPLICATED: ICD-10-CM

## 2022-08-23 NOTE — HISTORY OF PRESENT ILLNESS
[Current] : current smoker [_____ pack-years] : [unfilled] pack-years [TextBox_13] : Referred by Dr. Siva Evans.\par \par Mr. NOE is a 66 year old male with a history of HTN and high cholesterol.   Reviewed and confirmed that the patient meets screening eligibility criteria:\par \par 66 years old \par \par Smoking Status: Current Smoker\par \par Number of pack(s) per day: 1\par Number of years smoked: 45\par Number of pack years smokin\par \par No reported symptoms of lung cancer, including new cough, change in cough, hemoptysis, and unintentional weight loss.\par \par No reported personal history of lung cancer.  Lung cancer in a first degree relative, his father.  No reported history of lung disease or occupational exposures. [TextBox_6] : 1 [TextBox_8] : 45

## 2022-08-26 ENCOUNTER — APPOINTMENT (OUTPATIENT)
Dept: CT IMAGING | Facility: CLINIC | Age: 67
End: 2022-08-26

## 2022-09-01 ENCOUNTER — APPOINTMENT (OUTPATIENT)
Dept: PULMONOLOGY | Facility: CLINIC | Age: 67
End: 2022-09-01

## 2022-12-06 ENCOUNTER — APPOINTMENT (OUTPATIENT)
Dept: PULMONOLOGY | Facility: CLINIC | Age: 67
End: 2022-12-06

## 2022-12-22 ENCOUNTER — APPOINTMENT (OUTPATIENT)
Dept: PULMONOLOGY | Facility: CLINIC | Age: 67
End: 2022-12-22

## 2022-12-22 VITALS
HEART RATE: 64 BPM | DIASTOLIC BLOOD PRESSURE: 62 MMHG | SYSTOLIC BLOOD PRESSURE: 142 MMHG | OXYGEN SATURATION: 96 % | RESPIRATION RATE: 16 BRPM

## 2022-12-22 VITALS — WEIGHT: 244 LBS | BODY MASS INDEX: 30.02 KG/M2 | HEIGHT: 75.5 IN

## 2022-12-22 PROCEDURE — 94727 GAS DIL/WSHOT DETER LNG VOL: CPT

## 2022-12-22 PROCEDURE — 94010 BREATHING CAPACITY TEST: CPT

## 2022-12-22 PROCEDURE — 99214 OFFICE O/P EST MOD 30 MIN: CPT | Mod: 25

## 2022-12-22 PROCEDURE — 85018 HEMOGLOBIN: CPT | Mod: QW

## 2022-12-22 PROCEDURE — 94729 DIFFUSING CAPACITY: CPT

## 2022-12-22 PROCEDURE — 99406 BEHAV CHNG SMOKING 3-10 MIN: CPT

## 2022-12-22 NOTE — HISTORY OF PRESENT ILLNESS
[Current] : current [Never] : never [TextBox_4] : Pt s/p Covid infection 6/2022 with fevers x 2 days, mild cough, body aches. Did not require therapy and improved after 1 week.\par Pt now with improved cough and SOBOE.\par Pt denies significant sleep complaints. \par Follows with cardiology for HTN and HLD.\par Current smoker of 1 ppd x 46 years (since age 21)\par  [TextBox_11] : 1 [TextBox_13] : 46

## 2022-12-22 NOTE — DISCUSSION/SUMMARY
[FreeTextEntry1] : \par #1. PFTs performed today are essentially normal.\par #2. The patient does not appear to require chronic BD therapy at this time\par #3. SOBOE is likely at least somewhat related to weight or deconditioning given PFT findings\par #4. Diet and exercise for weight loss \par #5. Chest CT for lung cancer screening given smoking hx. Risks and benefits regarding screening CT discussed including but not limited to the benefit of early lung cancer detection as well as other possible unknown pathology but also risk of discovering nodules or other findings which may be benign but will require periodic evaluation. Did not have for this visit.\par #6. Pt denies significant sleep complaints. \par #7. Pt had both Covid vaccines and booster; s/p Covid infection\par #8. F/u in 1 months with chest CT \par #9. Reviewed risks of exposure and symptoms of Covid-19 virus, including how the virus is spread and precautions to avoid vlad virus. \par \par The patient expressed understanding and agreement with the above recommendations/plan and accepts responsibility to be compliant with recommended testing, therapies, and f/u visits.\par All relevant questions and concerns were addressed.

## 2022-12-22 NOTE — COUNSELING
[Cessation strategies including cessation program discussed] : Cessation strategies including cessation program discussed [Use of nicotine replacement therapies and other medications discussed] : Use of nicotine replacement therapies and other medications discussed [Encouraged to pick a quit date and identify support needed to quit] : Encouraged to pick a quit date and identify support needed to quit [Potential consequences of obesity discussed] : Potential consequences of obesity discussed [Benefits of weight loss discussed] : Benefits of weight loss discussed [Encouraged to increase physical activity] : Encouraged to increase physical activity [FreeTextEntry1] : 4

## 2022-12-22 NOTE — CONSULT LETTER
[Dear  ___] : Dear  [unfilled], [Consult Letter:] : I had the pleasure of evaluating your patient, [unfilled]. [Please see my note below.] : Please see my note below. [Consult Closing:] : Thank you very much for allowing me to participate in the care of this patient.  If you have any questions, please do not hesitate to contact me. [Sincerely,] : Sincerely, [DrRonna  ___] : Dr. REESE [FreeTextEntry3] : Siva Evans MD, FCCP, D. ABSM\par Pulmonary and Sleep Medicine\par [facility] at [place]

## 2022-12-22 NOTE — REASON FOR VISIT
[Follow-Up] : a follow-up visit [Nicotine Dependence] : nicotine dependence [TextBox_44] : Prior Covid infection

## 2023-01-01 NOTE — ASU PREOP CHECKLIST - PATIENT'S PERSONAL PROPERTY GIVEN TO
DISCHARGE INSTRUCTIONS    Name: Ne Beauchamp  YOB: 2023  Time of Birth: 7:32 AM  Primary Diagnosis: Principal Problem:    Single liveborn, born in hospital, delivered by vaginal delivery (2023)        Birthweight: 3.105 kg  % Weight change: -5%  Discharge weight: 2.965kg  Last Bilirubin: 4.8 (12.4 light level at 25 hours of life)    Congratulations! Here are some things to remember:    During your baby's first few weeks, you will spend most of your time feeding, diapering, and comforting your baby. You may feel overwhelmed at times. It is normal to wonder if you know what you are doing, especially if you are first-time parents.  care gets easier with every day. Soon you will know what each cry means and be able to figure out what your baby needs and wants. General:     Cord Care:     - Keep dry and keep diaper folded below umbilical cord   - Sponge bathe only when needed, until cord falls completely off  - Stump should fall off within a week or two          Feeding:   - Breastfeed baby on demand, every 2-3 hours, (at least 8 times in a 24 hour period). - Typically recommend feeding your baby on demand. This means that you should breastfeed or bottle-feed your baby whenever he or she seems hungry.  - During the first few weeks,  babies need to be fed every 1 to 3 hours (10 to 12 times in 24 hours) or whenever the baby is hungry. Formula-fed babies may need     fewer feedings, about 6 to 10 every 24 hours. - You may have to wake your sleepy baby to feed in the first few days after birth. - By 1-2 months, your baby may start spacing out feedings  - Let your baby tell you when and how much they need to eat  - Breastfeeding your child may help prevent sudden infant death syndrome (SIDS). Diaper changing and bowel habits:  - Try to check your baby's diaper at least every 2 hours. If it needs to be changed, do it as soon as you can to help prevent diaper rash.   - Your 's wet and soiled diapers can give you clues about your baby's health. Babies can become dehydrated if they're not getting enough breast milk or formula or if     they lose fluid because of diarrhea, vomiting, or a fever.  - For the first few days, your baby may have about 3 wet diapers a day. After that, expect 6 or more wet diapers a day throughout the first month of life. - Keep track of what bowel habits are normal or usual for your child. Circumcision Care (if applicable):       - Notify MD for redness, drainage, or bleeding  - Use Vaseline gauze over tip of penis for 1-3 days    Medications:         Physical Activity / Restrictions / Safety:        Positioning /Safe Sleep   - The safest place for a baby is in a crib, cradle, or bassinet that meets safety standards (I.e. not sling, swing, bouncer or stroller)  - Always position baby on his or her back while sleeping. This lowers the risk of sudden infant death syndrome (SIDS). - Use a firm mattress with fitted sheet. - The American Academy of Pediatrics recommends that you do not sleep with your baby in the bed with you  - Keep soft items and loose bedding out of the crib. Items such as blankets, stuffed animals, toys, and pillows could block your baby's mouth or trap your baby. Dress your     baby in sleepers instead of using blankets. - Most newborns sleep for a total of ~18h per day. They wake for a short time at least every 2 to 3 hours  - Newborns have some moments of active sleep, where they make sounds or seem restless. This happens ~every 50 to 60 minutes and usually lasts a few minutes. - When your  wakes up, he or she usually will be hungry and will need to be fed    Car Seat:      - Car seat should be reclining, rear facing, and in the back seat of the car  - For help with installation or use of your carseat, you can go to www.seatcheck. org to     find your local police or fire department for help.      Crying:         - Caring for a baby can be trying at times. You may have periods of feeling overwhelmed, especially if your baby is crying.   - Many babies cry from 1 to 5 hours out of every 24 hours during the first few months of life. Some babies cry more and for no reason  - If your baby has been changed and fed but is still crying you may utilize soothing techniques such as white noise, \"shhhing\" sounds,         swaddling, swinging, and sucking (i.e. pacifier)  - Never shake your baby to console them. Never slap or hit your baby. - Please contact your healthcare provider if you feel something is wrong with your baby    Smoking exposure:  - Do not smoke or let anyone else smoke in the house or around your baby. Exposure to smoke increases the risk of SIDS. - If you need help quitting, talk to your doctor about stop-smoking programs and medicines. These can increase your chances of quitting for good. Notify Doctor For:     Call your baby's doctor for the following:   - Rectal temperature that is less than 97.7°F or is 100.4°F or higher in the first 2 mos of life, go to ER   - Skin or whites of the eyes gets a brighter or deeper yellow. (called jaundice)  - Increased irritability and/or sleepiness  - Wetting less than 5 diapers per day for formula fed babies  - Wetting less than 6 diapers per day once your breast milk is in, (at 117 days of age)  - Diarrhea or Vomiting  - Pus or red skin on or around the umbilical cord stump. These are signs of infection. Post Partum Depression:  - Some sadness is normal for up to 2 weeks.  If sadness continues, talk to a doctor   - Please talk to a doctor (Ob, Pediatrician, or other physician) if you ever have thoughts      of hurting yourself or hurting the baby    Pain Management:     Pain Management:   - Bundling, Patting, and Dress Appropriately    Follow-Up Care:     Appointment with MD: Harmony Allen in 1-2 days  - If you have not yet made a follow up appointment, call your baby's doctors office on    the next business day to make an appointment for baby's first office visit. Follow-up care is a key part of your child's treatment and safety. Be sure to make and go to all appointments, and call your doctor if your child is having problems. It's also a good idea to know your child's test results and keep a list of the medicines your child takes.       Signed By: Latosha Valente MD                                                                                                   Date: 2023 Time: 9:38 AM on unit

## 2023-01-09 ENCOUNTER — OFFICE (OUTPATIENT)
Dept: URBAN - METROPOLITAN AREA CLINIC 113 | Facility: CLINIC | Age: 68
Setting detail: OPHTHALMOLOGY
End: 2023-01-09
Payer: MEDICARE

## 2023-01-09 DIAGNOSIS — H40.2221: ICD-10-CM

## 2023-01-09 DIAGNOSIS — H40.033: ICD-10-CM

## 2023-01-09 DIAGNOSIS — H25.13: ICD-10-CM

## 2023-01-09 DIAGNOSIS — H40.2212: ICD-10-CM

## 2023-01-09 DIAGNOSIS — H43.393: ICD-10-CM

## 2023-01-09 PROCEDURE — 99214 OFFICE O/P EST MOD 30 MIN: CPT | Performed by: STUDENT IN AN ORGANIZED HEALTH CARE EDUCATION/TRAINING PROGRAM

## 2023-01-09 PROCEDURE — 92250 FUNDUS PHOTOGRAPHY W/I&R: CPT | Performed by: STUDENT IN AN ORGANIZED HEALTH CARE EDUCATION/TRAINING PROGRAM

## 2023-01-09 ASSESSMENT — VISUAL ACUITY
OD_BCVA: 20/25-
OS_BCVA: 20/30+

## 2023-01-09 ASSESSMENT — REFRACTION_MANIFEST
OD_CYLINDER: SPHERE
OS_VA1: 20/20-
OS_CYLINDER: -1.00
OS_AXIS: 61
OS_AXIS: 079
OS_SPHERE: +0.25
OS_SPHERE: -1.25
OD_AXIS: 003
OD_SPHERE: +0.25
OD_VA1: 20/20
OS_CYLINDER: +0.25
OD_AXIS: 0
OD_SPHERE: -3.25
OS_VA1: 20/20
OD_CYLINDER: -0.25
OD_VA1: 20/25

## 2023-01-09 ASSESSMENT — KERATOMETRY
OS_K1POWER_DIOPTERS: 40.00
OD_K2POWER_DIOPTERS: 40.75
OS_AXISANGLE_DEGREES: 155
OD_AXISANGLE_DEGREES: 066
OS_K2POWER_DIOPTERS: 40.25
OD_K1POWER_DIOPTERS: 39.00

## 2023-01-09 ASSESSMENT — AXIALLENGTH_DERIVED
OS_AL: 25.6838
OS_AL: 24.7386
OD_AL: 26.5727
OD_AL: 26.5727
OS_AL: 25.6838

## 2023-01-09 ASSESSMENT — TONOMETRY
OD_IOP_MMHG: 12
OS_IOP_MMHG: 12

## 2023-01-09 ASSESSMENT — SPHEQUIV_DERIVED
OD_SPHEQUIV: -3.375
OD_SPHEQUIV: -3.375
OS_SPHEQUIV: -1.75
OS_SPHEQUIV: -1.75
OS_SPHEQUIV: 0.375

## 2023-01-09 ASSESSMENT — REFRACTION_CURRENTRX
OD_OVR_VA: 20/
OS_VPRISM_DIRECTION: SV
OS_AXIS: 079
OD_AXIS: 170
OD_VPRISM_DIRECTION: SV
OS_SPHERE: -1.25
OS_OVR_VA: 20/
OS_CYLINDER: +0.75
OD_CYLINDER: -0.50
OD_SPHERE: -2.50

## 2023-01-09 ASSESSMENT — REFRACTION_AUTOREFRACTION
OD_AXIS: 003
OD_SPHERE: -3.25
OS_CYLINDER: -1.00
OS_AXIS: 079
OS_SPHERE: -1.25
OD_CYLINDER: -0.25

## 2023-01-09 ASSESSMENT — PACHYMETRY
OD_CT_CORRECTION: 2
OD_CT_UM: 517
OS_CT_UM: 498
OS_CT_CORRECTION: 4

## 2023-01-16 ENCOUNTER — APPOINTMENT (OUTPATIENT)
Dept: CT IMAGING | Facility: CLINIC | Age: 68
End: 2023-01-16
Payer: MEDICARE

## 2023-01-16 ENCOUNTER — OUTPATIENT (OUTPATIENT)
Dept: OUTPATIENT SERVICES | Facility: HOSPITAL | Age: 68
LOS: 1 days | End: 2023-01-16
Payer: COMMERCIAL

## 2023-01-16 DIAGNOSIS — F17.210 NICOTINE DEPENDENCE, CIGARETTES, UNCOMPLICATED: ICD-10-CM

## 2023-01-16 DIAGNOSIS — Z98.890 OTHER SPECIFIED POSTPROCEDURAL STATES: Chronic | ICD-10-CM

## 2023-01-16 PROCEDURE — 71271 CT THORAX LUNG CANCER SCR C-: CPT | Mod: 26

## 2023-01-16 PROCEDURE — 71271 CT THORAX LUNG CANCER SCR C-: CPT

## 2023-01-17 ENCOUNTER — APPOINTMENT (OUTPATIENT)
Dept: CARDIOLOGY | Facility: CLINIC | Age: 68
End: 2023-01-17
Payer: MEDICARE

## 2023-01-17 ENCOUNTER — APPOINTMENT (OUTPATIENT)
Dept: PULMONOLOGY | Facility: CLINIC | Age: 68
End: 2023-01-17
Payer: MEDICARE

## 2023-01-17 ENCOUNTER — OFFICE (OUTPATIENT)
Dept: URBAN - METROPOLITAN AREA CLINIC 105 | Facility: CLINIC | Age: 68
Setting detail: OPHTHALMOLOGY
End: 2023-01-17
Payer: MEDICARE

## 2023-01-17 ENCOUNTER — NON-APPOINTMENT (OUTPATIENT)
Age: 68
End: 2023-01-17

## 2023-01-17 VITALS
SYSTOLIC BLOOD PRESSURE: 124 MMHG | DIASTOLIC BLOOD PRESSURE: 82 MMHG | BODY MASS INDEX: 29.78 KG/M2 | HEART RATE: 67 BPM | RESPIRATION RATE: 16 BRPM | WEIGHT: 242 LBS | HEIGHT: 75.5 IN | OXYGEN SATURATION: 97 %

## 2023-01-17 VITALS
RESPIRATION RATE: 16 BRPM | WEIGHT: 242 LBS | OXYGEN SATURATION: 97 % | DIASTOLIC BLOOD PRESSURE: 74 MMHG | TEMPERATURE: 98 F | SYSTOLIC BLOOD PRESSURE: 120 MMHG | HEIGHT: 75.5 IN | HEART RATE: 73 BPM | BODY MASS INDEX: 29.78 KG/M2

## 2023-01-17 DIAGNOSIS — Z71.6 TOBACCO ABUSE COUNSELING: ICD-10-CM

## 2023-01-17 DIAGNOSIS — H40.2212: ICD-10-CM

## 2023-01-17 DIAGNOSIS — H43.393: ICD-10-CM

## 2023-01-17 DIAGNOSIS — H40.2221: ICD-10-CM

## 2023-01-17 DIAGNOSIS — R03.0 ELEVATED BLOOD-PRESSURE READING, W/OUT DIAGNOSIS OF HYPERTENSION: ICD-10-CM

## 2023-01-17 DIAGNOSIS — H25.13: ICD-10-CM

## 2023-01-17 DIAGNOSIS — F17.200 NICOTINE DEPENDENCE, UNSPECIFIED, UNCOMPLICATED: ICD-10-CM

## 2023-01-17 DIAGNOSIS — H25.11: ICD-10-CM

## 2023-01-17 DIAGNOSIS — E66.3 OVERWEIGHT: ICD-10-CM

## 2023-01-17 PROCEDURE — 99214 OFFICE O/P EST MOD 30 MIN: CPT | Mod: 25

## 2023-01-17 PROCEDURE — 99214 OFFICE O/P EST MOD 30 MIN: CPT | Performed by: STUDENT IN AN ORGANIZED HEALTH CARE EDUCATION/TRAINING PROGRAM

## 2023-01-17 PROCEDURE — 92133 CPTRZD OPH DX IMG PST SGM ON: CPT | Performed by: STUDENT IN AN ORGANIZED HEALTH CARE EDUCATION/TRAINING PROGRAM

## 2023-01-17 PROCEDURE — 99215 OFFICE O/P EST HI 40 MIN: CPT | Mod: 25

## 2023-01-17 PROCEDURE — 92136 OPHTHALMIC BIOMETRY: CPT | Performed by: STUDENT IN AN ORGANIZED HEALTH CARE EDUCATION/TRAINING PROGRAM

## 2023-01-17 PROCEDURE — 99406 BEHAV CHNG SMOKING 3-10 MIN: CPT

## 2023-01-17 PROCEDURE — 92083 EXTENDED VISUAL FIELD XM: CPT | Performed by: STUDENT IN AN ORGANIZED HEALTH CARE EDUCATION/TRAINING PROGRAM

## 2023-01-17 PROCEDURE — 93000 ELECTROCARDIOGRAM COMPLETE: CPT

## 2023-01-17 ASSESSMENT — KERATOMETRY
OS_K1POWER_DIOPTERS: 40.25
OD_K1K2_AVERAGE: 40.125
OS_K2POWER_DIOPTERS: 40.25
OS_CYLAXISANGLE_DEGREES: 180
OS_K1K2_AVERAGE: 40.25
OD_AXISANGLE2_DEGREES: 074
OD_CYLAXISANGLE_DEGREES: 074
OD_CYLPOWER_DEGREES: 0.75
OS_AXISANGLE_DEGREES: 180
OS_AXISANGLE2_DEGREES: 090
OD_K2POWER_DIOPTERS: 40.50
OD_K1POWER_DIOPTERS: 39.75
OD_AXISANGLE_DEGREES: 164

## 2023-01-17 ASSESSMENT — CONFRONTATIONAL VISUAL FIELD TEST (CVF)
OS_FINDINGS: FULL
OD_FINDINGS: FULL

## 2023-01-17 ASSESSMENT — PACHYMETRY
OS_CT_UM: 498
OS_CT_CORRECTION: 4
OD_CT_CORRECTION: 2
OD_CT_UM: 517

## 2023-01-17 NOTE — CONSULT LETTER
[Dear  ___] : Dear  [unfilled], [Consult Letter:] : I had the pleasure of evaluating your patient, [unfilled]. [Please see my note below.] : Please see my note below. [Consult Closing:] : Thank you very much for allowing me to participate in the care of this patient.  If you have any questions, please do not hesitate to contact me. [Sincerely,] : Sincerely, [DrRonna  ___] : Dr. REESE [FreeTextEntry3] : Siva Evans MD, FCCP, D. ABSM\par Pulmonary and Sleep Medicine\par [facility] at [place]

## 2023-01-17 NOTE — REASON FOR VISIT
[FreeTextEntry1] : carotid disease, non obstructive CAD,  [Follow-Up - Clinic] : a clinic follow-up of [FreeTextEntry2] : carotid disease, non obstructive CAD,

## 2023-01-17 NOTE — HISTORY OF PRESENT ILLNESS
[FreeTextEntry1] : F/u:  HTN, Non obstructive CAD, HLD, smoking addition,  \par \par HPI for today: :   no chest pain .  no dyspnea on exertion . no leg edema.  complaitn with med.  \par no diziznes. no palptiations.\par still smoking.  smokes 15 cig /days\par \par \par old note:  he still smoking. half what he did. trying to cut back.  he has been p[hysicallly active. \par no chest pain . \par  \par old note: he is still smoking. 1 pack a day a he is eating healthy. some dizziness when he gets up quickly.\par cramps in the legs.\par \par old noteL:  :  fels good. complaitn with meds.  he smokes 5-6 a day. he tries to eat healthy food.  he is phsycially activie\par chronic condictions: hypertension : controlled. \par coronary artery disease : stable. \par \par old note: : feels good. Still smokes 1 pack a day,   he went back to 1 pack a day\par No chest pain. no headaches. No dizziness. no dyspnea. \par \par \par old note: cut down to 4-5 /day. Changing his habits related to smoking,.\par No chest pain. + cough. Bronchnitis symptoms.\par complains of dizziness. \par \par \par \par old note: smoking is less. but still smokes.  its 10-13 day. \par No chest pain. + dizziness when he stands up quickly. \par Some dyspnea on exertion. but similar exercsie capacity. \par \par old note: Recent right shoulder surery done.  tolerated surgery well. 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 patient states his BP has been always in normal range throughout his life.

## 2023-01-17 NOTE — DISCUSSION/SUMMARY
[FreeTextEntry1] : \par #1. PFTs performed previously were essentially normal.\par #2. The patient does not appear to require chronic BD therapy at this time\par #3. SOBOE is likely at least somewhat related to weight or deconditioning given PFT findings\par #4. Diet and exercise for weight loss \par #5. Chest CT for lung cancer screening given smoking hx was essentially clear; continue annual f/u\par #6. Pt denies significant sleep complaints. \par #7. Pt had both Covid vaccines and booster; s/p Covid infection\par #8. Stressed smoking cessation again\par #9. F/u in 6 months with PFTs\par #10. Reviewed risks of exposure and symptoms of Covid-19 virus, including how the virus is spread and precautions to avoid vlad virus. \par \par The patient expressed understanding and agreement with the above recommendations/plan and accepts responsibility to be compliant with recommended testing, therapies, and f/u visits.\par All relevant questions and concerns were addressed.

## 2023-01-17 NOTE — PHYSICAL EXAM
[General Appearance - Well Developed] : well developed [Normal Appearance] : normal appearance [Well Groomed] : well groomed [General Appearance - Well Nourished] : well nourished [No Deformities] : no deformities [General Appearance - In No Acute Distress] : no acute distress [Normal Conjunctiva] : the conjunctiva exhibited no abnormalities [Eyelids - No Xanthelasma] : the eyelids demonstrated no xanthelasmas [Normal Oral Mucosa] : normal oral mucosa [No Oral Pallor] : no oral pallor [No Oral Cyanosis] : no oral cyanosis [Normal Jugular Venous A Waves Present] : normal jugular venous A waves present [Normal Jugular Venous V Waves Present] : normal jugular venous V waves present [No Jugular Venous Donato A Waves] : no jugular venous donato A waves [Respiration, Rhythm And Depth] : normal respiratory rhythm and effort [Exaggerated Use Of Accessory Muscles For Inspiration] : no accessory muscle use [Auscultation Breath Sounds / Voice Sounds] : lungs were clear to auscultation bilaterally [Heart Rate And Rhythm] : heart rate and rhythm were normal [Heart Sounds] : normal S1 and S2 [Abdomen Soft] : soft [Abdomen Tenderness] : non-tender [Abdomen Mass (___ Cm)] : no abdominal mass palpated [Abnormal Walk] : normal gait [Gait - Sufficient For Exercise Testing] : the gait was sufficient for exercise testing [Nail Clubbing] : no clubbing of the fingernails [Cyanosis, Localized] : no localized cyanosis [Petechial Hemorrhages (___cm)] : no petechial hemorrhages [Skin Color & Pigmentation] : normal skin color and pigmentation [] : no rash [No Venous Stasis] : no venous stasis [Skin Lesions] : no skin lesions [No Skin Ulcers] : no skin ulcer [No Xanthoma] : no  xanthoma was observed [FreeTextEntry1] : sebaceous cyst on the neck posteriorly.  [Oriented To Time, Place, And Person] : oriented to person, place, and time [Affect] : the affect was normal [Mood] : the mood was normal [No Anxiety] : not feeling anxious

## 2023-01-17 NOTE — DISCUSSION/SUMMARY
[Patient] : the patient [Risks] : risks [Benefits] : benefits [With Me] : with me [___ Month(s)] : in [unfilled] month(s) [FreeTextEntry1] : This is a 67 M with h/o dyslipidemia, smoking, recent diagnosis of hypertension, complains of dyspnea on exertion.\par 1) HTN with LVH and grade 1 DD.: Likely essential. controlled Continue diet and exercise.  ct valsartan- HCTZ  to 80-12.5   \par 2) Non obstructive CAD:   ASA 81 and lipitor 20 mg .   repeat stress test. \par 3) moderate atherosclerosis of carotid: ASA  and  statins.  \par 4) AAA screening:   US aorta: mild diffuse heterogenous plaque. no dilation,. \par 5) Smoking cessation:  still smoking. trying to quit \par 6)  mild aortic stensos. : repeat echo  \par Will order and review ECG for the above mentioned diagnosis/condition/symptoms \par patient will ask his PCP to get blodo work done for his choslterol and will send us the result.s \par  [EKG obtained to assist in diagnosis and management of assessed problem(s)] : EKG obtained to assist in diagnosis and management of assessed problem(s)

## 2023-01-17 NOTE — CARDIOLOGY SUMMARY
[de-identified] : 1 17 2023 Sinus  Rhythm \par -Left bundle branch block. \par \par ABNORMAL \par \par \par 7 27 2022 Sinus  Rhythm \par -Left bundle branch block. \par \par ABNORMAL \par \par  [de-identified] : 2/2021: No ischemia. Nuclear dann stress echo.  [de-identified] : feb 2021:  Normal LVef 55-60%.  aortic calcification. mild aortic  stenosis.  [de-identified] : Carotid DUplex: moderate atherosclerosis. No stenosis.  [No Ischemia] : no Ischemia [No Symptoms] : no Symptoms [LVEF ___%] : LVEF [unfilled]% [___] : [unfilled] [None] : no pulmonary hypertension [Normal] : normal LA size [Mild] : mild mitral regurgitation [de-identified] : carotid Dupelx: 2017: moderate ahtersclerosis. \par \par Aug 2018: AAA screening: No aneurysm. mild plaque.

## 2023-01-17 NOTE — REASON FOR VISIT
[Follow-Up] : a follow-up visit [Obesity] : obesity [Nicotine Dependence] : nicotine dependence [TextBox_[id number]] : Prior Covid infection

## 2023-01-31 PROBLEM — H40.1132 POAG; BOTH EYES MODERATE: Status: ACTIVE | Noted: 2023-01-17

## 2023-01-31 ASSESSMENT — REFRACTION_MANIFEST
OS_AXIS: 079
OD_AXIS: 003
OD_SPHERE: -3.25
OS_CYLINDER: +0.25
OS_VA1: 20/20
OD_CYLINDER: SPHERE
OS_CYLINDER: -1.00
OD_AXIS: 0
OD_VA1: 20/20
OS_VA1: 20/20-
OD_VA1: 20/25
OS_SPHERE: +0.25
OS_SPHERE: -1.25
OD_SPHERE: +0.25
OS_AXIS: 61
OD_CYLINDER: -0.25

## 2023-01-31 ASSESSMENT — AXIALLENGTH_DERIVED
OS_AL: 25.7449
OD_AL: 26.4567
OS_AL: 24.6882
OS_AL: 25.6294
OD_AL: 26.5798

## 2023-01-31 ASSESSMENT — REFRACTION_AUTOREFRACTION
OD_AXIS: 176
OD_SPHERE: -3.25
OS_SPHERE: -1.50
OS_CYLINDER: -1.00
OD_CYLINDER: -0.75
OS_AXIS: 079

## 2023-01-31 ASSESSMENT — REFRACTION_CURRENTRX
OD_CYLINDER: -0.50
OS_AXIS: 079
OS_OVR_VA: 20/
OD_VPRISM_DIRECTION: SV
OD_SPHERE: -2.50
OS_VPRISM_DIRECTION: SV
OS_CYLINDER: +0.75
OD_AXIS: 170
OS_SPHERE: -1.25
OD_OVR_VA: 20/

## 2023-01-31 ASSESSMENT — SPHEQUIV_DERIVED
OS_SPHEQUIV: -2
OD_SPHEQUIV: -3.375
OS_SPHEQUIV: 0.375
OS_SPHEQUIV: -1.75
OD_SPHEQUIV: -3.625

## 2023-01-31 ASSESSMENT — VISUAL ACUITY
OD_BCVA: 20/25-2
OS_BCVA: 20/40+1

## 2023-02-13 ENCOUNTER — ASC (OUTPATIENT)
Dept: URBAN - METROPOLITAN AREA SURGERY 8 | Facility: SURGERY | Age: 68
Setting detail: OPHTHALMOLOGY
End: 2023-02-13
Payer: MEDICARE

## 2023-02-13 DIAGNOSIS — H25.11: ICD-10-CM

## 2023-02-13 DIAGNOSIS — H52.211: ICD-10-CM

## 2023-02-13 PROCEDURE — FEMTO CATARACT LASER: Performed by: STUDENT IN AN ORGANIZED HEALTH CARE EDUCATION/TRAINING PROGRAM

## 2023-02-13 PROCEDURE — 66991 XCAPSL CTRC RMVL INSJ 1+: CPT | Performed by: STUDENT IN AN ORGANIZED HEALTH CARE EDUCATION/TRAINING PROGRAM

## 2023-02-14 ENCOUNTER — RX ONLY (RX ONLY)
Age: 68
End: 2023-02-14

## 2023-02-14 ENCOUNTER — OFFICE (OUTPATIENT)
Dept: URBAN - METROPOLITAN AREA CLINIC 113 | Facility: CLINIC | Age: 68
Setting detail: OPHTHALMOLOGY
End: 2023-02-14
Payer: MEDICARE

## 2023-02-14 DIAGNOSIS — Z96.1: ICD-10-CM

## 2023-02-14 PROCEDURE — 99024 POSTOP FOLLOW-UP VISIT: CPT | Performed by: STUDENT IN AN ORGANIZED HEALTH CARE EDUCATION/TRAINING PROGRAM

## 2023-02-14 ASSESSMENT — KERATOMETRY
OS_K2POWER_DIOPTERS: 40.25
OD_K1POWER_DIOPTERS: 39.75
OS_AXISANGLE_DEGREES: 175
OD_AXISANGLE_DEGREES: 070
OD_K2POWER_DIOPTERS: 41.25
OS_K1POWER_DIOPTERS: 39.75

## 2023-02-14 ASSESSMENT — PACHYMETRY
OD_CT_CORRECTION: 2
OS_CT_CORRECTION: 4
OS_CT_UM: 498
OD_CT_UM: 517

## 2023-02-14 ASSESSMENT — AXIALLENGTH_DERIVED
OS_AL: 25.9134
OD_AL: 17.27
OD_AL: 26.2846
OS_AL: 24.7892
OS_AL: 25.7383

## 2023-02-14 ASSESSMENT — TONOMETRY: OD_IOP_MMHG: 10

## 2023-02-14 ASSESSMENT — REFRACTION_MANIFEST
OS_SPHERE: +0.25
OD_AXIS: 0
OS_VA1: 20/20
OD_CYLINDER: SPHERE
OS_CYLINDER: -1.00
OS_SPHERE: -1.25
OD_SPHERE: -3.25
OD_VA1: 20/25
OS_AXIS: 61
OS_AXIS: 079
OD_AXIS: 003
OS_VA1: 20/20-
OD_VA1: 20/20
OD_CYLINDER: -0.25
OS_CYLINDER: +0.25
OD_SPHERE: +0.25

## 2023-02-14 ASSESSMENT — SPHEQUIV_DERIVED
OS_SPHEQUIV: 0.375
OD_SPHEQUIV: -3.375
OD_SPHEQUIV: 25
OS_SPHEQUIV: -1.75
OS_SPHEQUIV: -2.125

## 2023-02-14 ASSESSMENT — VISUAL ACUITY
OS_BCVA: 20/50-1
OD_BCVA: 20/30-1

## 2023-02-14 ASSESSMENT — REFRACTION_AUTOREFRACTION
OD_SPHERE: +25.00
OS_AXIS: 078
OD_AXIS: 000
OS_CYLINDER: -1.25
OS_SPHERE: -1.50
OD_CYLINDER: 0.00

## 2023-02-14 ASSESSMENT — CONFRONTATIONAL VISUAL FIELD TEST (CVF)
OS_FINDINGS: FULL
OD_FINDINGS: FULL

## 2023-02-14 ASSESSMENT — REFRACTION_CURRENTRX
OD_VPRISM_DIRECTION: SV
OS_CYLINDER: +0.75
OS_SPHERE: -1.25
OD_SPHERE: -2.50
OS_VPRISM_DIRECTION: SV
OD_OVR_VA: 20/
OD_AXIS: 170
OS_AXIS: 079
OS_OVR_VA: 20/
OD_CYLINDER: -0.50

## 2023-02-20 ENCOUNTER — OFFICE (OUTPATIENT)
Dept: URBAN - METROPOLITAN AREA CLINIC 113 | Facility: CLINIC | Age: 68
Setting detail: OPHTHALMOLOGY
End: 2023-02-20
Payer: MEDICARE

## 2023-02-20 DIAGNOSIS — Z96.1: ICD-10-CM

## 2023-02-20 DIAGNOSIS — H25.12: ICD-10-CM

## 2023-02-20 PROCEDURE — 99024 POSTOP FOLLOW-UP VISIT: CPT | Performed by: STUDENT IN AN ORGANIZED HEALTH CARE EDUCATION/TRAINING PROGRAM

## 2023-02-20 PROCEDURE — 92136 OPHTHALMIC BIOMETRY: CPT | Performed by: STUDENT IN AN ORGANIZED HEALTH CARE EDUCATION/TRAINING PROGRAM

## 2023-02-20 ASSESSMENT — PACHYMETRY
OS_CT_CORRECTION: 4
OD_CT_UM: 517
OS_CT_UM: 498
OD_CT_CORRECTION: 2

## 2023-02-20 ASSESSMENT — TONOMETRY
OD_IOP_MMHG: 11
OS_IOP_MMHG: 12

## 2023-02-22 ASSESSMENT — REFRACTION_AUTOREFRACTION
OS_SPHERE: -1.75
OD_SPHERE: 0.00
OS_CYLINDER: -1.00
OD_CYLINDER: -0.50
OD_AXIS: 172
OS_AXIS: 081

## 2023-02-22 ASSESSMENT — AXIALLENGTH_DERIVED
OS_AL: 25.5754
OD_AL: 26.4567
OS_AL: 25.8064
OS_AL: 24.6381
OD_AL: 25.0093

## 2023-02-22 ASSESSMENT — KERATOMETRY
OS_K2POWER_DIOPTERS: 40.50
OD_AXISANGLE_DEGREES: 060
OS_K1POWER_DIOPTERS: 40.25
OS_AXISANGLE_DEGREES: 103
OD_K1POWER_DIOPTERS: 39.50
OD_K2POWER_DIOPTERS: 40.75

## 2023-02-22 ASSESSMENT — REFRACTION_CURRENTRX
OD_CYLINDER: -0.50
OD_SPHERE: -2.50
OS_OVR_VA: 20/
OD_OVR_VA: 20/
OD_VPRISM_DIRECTION: SV
OS_AXIS: 079
OS_SPHERE: -1.25
OD_AXIS: 170
OS_CYLINDER: +0.75
OS_VPRISM_DIRECTION: SV

## 2023-02-22 ASSESSMENT — REFRACTION_MANIFEST
OS_VA1: 20/20
OD_SPHERE: -3.25
OD_SPHERE: +0.25
OS_CYLINDER: -1.00
OD_AXIS: 003
OS_SPHERE: -1.25
OD_CYLINDER: SPHERE
OS_AXIS: 079
OS_CYLINDER: +0.25
OD_VA1: 20/20
OS_AXIS: 61
OS_VA1: 20/20-
OD_AXIS: 0
OS_SPHERE: +0.25
OD_CYLINDER: -0.25
OD_VA1: 20/25

## 2023-02-22 ASSESSMENT — SPHEQUIV_DERIVED
OD_SPHEQUIV: -0.25
OS_SPHEQUIV: -1.75
OS_SPHEQUIV: 0.375
OD_SPHEQUIV: -3.375
OS_SPHEQUIV: -2.25

## 2023-02-22 ASSESSMENT — VISUAL ACUITY
OD_BCVA: 20/60
OS_BCVA: 20/25

## 2023-02-23 ENCOUNTER — OFFICE (OUTPATIENT)
Dept: URBAN - METROPOLITAN AREA CLINIC 113 | Facility: CLINIC | Age: 68
Setting detail: OPHTHALMOLOGY
End: 2023-02-23
Payer: MEDICARE

## 2023-02-23 ENCOUNTER — RX ONLY (RX ONLY)
Age: 68
End: 2023-02-23

## 2023-02-23 DIAGNOSIS — Z96.1: ICD-10-CM

## 2023-02-23 DIAGNOSIS — H16.221: ICD-10-CM

## 2023-02-23 PROCEDURE — 99024 POSTOP FOLLOW-UP VISIT: CPT | Performed by: STUDENT IN AN ORGANIZED HEALTH CARE EDUCATION/TRAINING PROGRAM

## 2023-02-23 ASSESSMENT — REFRACTION_AUTOREFRACTION
OS_SPHERE: -2.00
OD_AXIS: 167
OD_SPHERE: -1.00
OS_AXIS: 053
OS_CYLINDER: -1.00
OD_CYLINDER: -0.50

## 2023-02-23 ASSESSMENT — REFRACTION_MANIFEST
OD_AXIS: 0
OD_SPHERE: +0.25
OS_CYLINDER: +0.25
OD_VA1: 20/20
OD_CYLINDER: -0.25
OS_VA1: 20/20
OD_VA1: 20/25
OS_VA1: 20/20-
OS_CYLINDER: -1.00
OD_CYLINDER: SPHERE
OD_AXIS: 003
OS_AXIS: 079
OS_SPHERE: +0.25
OD_SPHERE: -3.25
OS_AXIS: 61
OS_SPHERE: -1.25

## 2023-02-23 ASSESSMENT — SPHEQUIV_DERIVED
OD_SPHEQUIV: -1.25
OD_SPHEQUIV: -3.375
OS_SPHEQUIV: -1.75
OS_SPHEQUIV: 0.375
OS_SPHEQUIV: -2.5

## 2023-02-23 ASSESSMENT — AXIALLENGTH_DERIVED
OD_AL: 26.8077
OS_AL: 24.7892
OS_AL: 26.0908
OD_AL: 25.7797
OS_AL: 25.7383

## 2023-02-23 ASSESSMENT — REFRACTION_CURRENTRX
OD_OVR_VA: 20/
OS_SPHERE: -1.25
OS_VPRISM_DIRECTION: SV
OD_SPHERE: -2.50
OD_CYLINDER: -0.50
OS_CYLINDER: +0.75
OD_VPRISM_DIRECTION: SV
OD_AXIS: 170
OS_OVR_VA: 20/
OS_AXIS: 079

## 2023-02-23 ASSESSMENT — KERATOMETRY
OS_K1POWER_DIOPTERS: 39.50
OS_AXISANGLE_DEGREES: 106
OS_K2POWER_DIOPTERS: 40.50
OD_K2POWER_DIOPTERS: 39.75
OD_K1POWER_DIOPTERS: 39.00
OD_AXISANGLE_DEGREES: 095

## 2023-02-23 ASSESSMENT — CONFRONTATIONAL VISUAL FIELD TEST (CVF)
OD_FINDINGS: FULL
OS_FINDINGS: FULL

## 2023-02-23 ASSESSMENT — VISUAL ACUITY
OS_BCVA: 20/80-1
OD_BCVA: 20/25

## 2023-02-23 ASSESSMENT — CORNEAL EDEMA CLINICAL DESCRIPTION: OD_CORNEALEDEMA: 1+ 2+

## 2023-02-23 ASSESSMENT — SUPERFICIAL PUNCTATE KERATITIS (SPK): OD_SPK: 4+

## 2023-02-24 ENCOUNTER — OFFICE (OUTPATIENT)
Dept: URBAN - METROPOLITAN AREA CLINIC 105 | Facility: CLINIC | Age: 68
Setting detail: OPHTHALMOLOGY
End: 2023-02-24
Payer: MEDICARE

## 2023-02-24 DIAGNOSIS — Z96.1: ICD-10-CM

## 2023-02-24 PROCEDURE — 99024 POSTOP FOLLOW-UP VISIT: CPT | Performed by: OPHTHALMOLOGY

## 2023-02-24 ASSESSMENT — REFRACTION_MANIFEST
OD_VA1: 20/25
OS_VA1: 20/20
OD_AXIS: 003
OS_VA1: 20/20-
OD_VA1: 20/20
OS_SPHERE: -1.25
OD_SPHERE: -3.25
OS_CYLINDER: +0.25
OD_CYLINDER: SPHERE
OS_AXIS: 61
OS_SPHERE: +0.25
OD_AXIS: 0
OD_CYLINDER: -0.25
OD_SPHERE: +0.25
OS_CYLINDER: -1.00
OS_AXIS: 079

## 2023-02-24 ASSESSMENT — SPHEQUIV_DERIVED
OS_SPHEQUIV: -1.75
OD_SPHEQUIV: -0.25
OS_SPHEQUIV: 0.375
OD_SPHEQUIV: -3.375
OS_SPHEQUIV: -1.875

## 2023-02-24 ASSESSMENT — AXIALLENGTH_DERIVED
OS_AL: 25.6871
OD_AL: 25.061
OD_AL: 26.5146
OS_AL: 24.6882
OS_AL: 25.6294

## 2023-02-24 ASSESSMENT — KERATOMETRY
OS_K1POWER_DIOPTERS: 40.25
OS_AXISANGLE_DEGREES: 090
OS_K2POWER_DIOPTERS: 40.25
OD_K2POWER_DIOPTERS: 40.50
OD_AXISANGLE_DEGREES: 086
OD_K1POWER_DIOPTERS: 39.50

## 2023-02-24 ASSESSMENT — REFRACTION_CURRENTRX
OD_CYLINDER: -0.50
OS_OVR_VA: 20/
OS_VPRISM_DIRECTION: SV
OS_SPHERE: -1.25
OS_AXIS: 079
OD_AXIS: 170
OD_VPRISM_DIRECTION: SV
OD_OVR_VA: 20/
OD_SPHERE: -2.50
OS_CYLINDER: +0.75

## 2023-02-24 ASSESSMENT — CONFRONTATIONAL VISUAL FIELD TEST (CVF)
OD_FINDINGS: FULL
OS_FINDINGS: FULL

## 2023-02-24 ASSESSMENT — SUPERFICIAL PUNCTATE KERATITIS (SPK): OD_SPK: 1+

## 2023-02-24 ASSESSMENT — REFRACTION_AUTOREFRACTION
OD_SPHERE: +0.25
OD_AXIS: 167
OS_CYLINDER: -1.25
OS_AXIS: 081
OD_CYLINDER: -1.00
OS_SPHERE: -1.25

## 2023-02-24 ASSESSMENT — VISUAL ACUITY
OD_BCVA: 20/70-1
OS_BCVA: 20/25+1

## 2023-02-24 ASSESSMENT — CORNEAL EDEMA CLINICAL DESCRIPTION: OD_CORNEALEDEMA: 1+ 2+

## 2023-02-27 ENCOUNTER — ASC (OUTPATIENT)
Dept: URBAN - METROPOLITAN AREA SURGERY 8 | Facility: SURGERY | Age: 68
Setting detail: OPHTHALMOLOGY
End: 2023-02-27
Payer: MEDICARE

## 2023-02-27 DIAGNOSIS — H52.212: ICD-10-CM

## 2023-02-27 DIAGNOSIS — H25.12: ICD-10-CM

## 2023-02-27 DIAGNOSIS — H40.1122: ICD-10-CM

## 2023-02-27 PROCEDURE — 66991 XCAPSL CTRC RMVL INSJ 1+: CPT | Performed by: STUDENT IN AN ORGANIZED HEALTH CARE EDUCATION/TRAINING PROGRAM

## 2023-02-27 PROCEDURE — FEMTO CATARACT LASER: Performed by: STUDENT IN AN ORGANIZED HEALTH CARE EDUCATION/TRAINING PROGRAM

## 2023-02-28 ENCOUNTER — OFFICE (OUTPATIENT)
Dept: URBAN - METROPOLITAN AREA CLINIC 113 | Facility: CLINIC | Age: 68
Setting detail: OPHTHALMOLOGY
End: 2023-02-28
Payer: MEDICARE

## 2023-02-28 DIAGNOSIS — Z96.1: ICD-10-CM

## 2023-02-28 PROCEDURE — 99024 POSTOP FOLLOW-UP VISIT: CPT | Performed by: STUDENT IN AN ORGANIZED HEALTH CARE EDUCATION/TRAINING PROGRAM

## 2023-02-28 ASSESSMENT — REFRACTION_AUTOREFRACTION
OS_CYLINDER: -1.00
OD_CYLINDER: -0.50
OS_AXIS: 090
OD_AXIS: 146
OS_SPHERE: +0.25
OD_SPHERE: +0.50

## 2023-02-28 ASSESSMENT — CONFRONTATIONAL VISUAL FIELD TEST (CVF)
OD_FINDINGS: FULL
OS_FINDINGS: FULL

## 2023-02-28 ASSESSMENT — REFRACTION_CURRENTRX
OS_OVR_VA: 20/
OS_CYLINDER: +0.75
OD_SPHERE: -2.50
OD_OVR_VA: 20/
OD_AXIS: 170
OD_CYLINDER: -0.50
OS_SPHERE: -1.25
OD_VPRISM_DIRECTION: SV
OS_VPRISM_DIRECTION: SV
OS_AXIS: 079

## 2023-02-28 ASSESSMENT — REFRACTION_MANIFEST
OS_SPHERE: +0.25
OS_CYLINDER: -1.00
OS_SPHERE: -1.25
OS_VA1: 20/20
OS_CYLINDER: +0.25
OD_VA1: 20/25
OS_AXIS: 61
OD_VA1: 20/20
OD_SPHERE: -3.25
OD_CYLINDER: SPHERE
OD_AXIS: 003
OS_AXIS: 079
OS_VA1: 20/20-
OD_SPHERE: +0.25
OD_AXIS: 0
OD_CYLINDER: -0.25

## 2023-02-28 ASSESSMENT — VISUAL ACUITY
OD_BCVA: 20/30+1
OS_BCVA: 20/20-1

## 2023-02-28 ASSESSMENT — SPHEQUIV_DERIVED
OS_SPHEQUIV: -0.25
OD_SPHEQUIV: -3.375
OD_SPHEQUIV: 0.25
OS_SPHEQUIV: -1.75
OS_SPHEQUIV: 0.375

## 2023-02-28 ASSESSMENT — CORNEAL EDEMA CLINICAL DESCRIPTION: OD_CORNEALEDEMA: 1+ 2+

## 2023-02-28 ASSESSMENT — SUPERFICIAL PUNCTATE KERATITIS (SPK): OD_SPK: 1+

## 2023-03-06 ENCOUNTER — OFFICE (OUTPATIENT)
Dept: URBAN - METROPOLITAN AREA CLINIC 113 | Facility: CLINIC | Age: 68
Setting detail: OPHTHALMOLOGY
End: 2023-03-06
Payer: MEDICARE

## 2023-03-06 ENCOUNTER — RX ONLY (RX ONLY)
Age: 68
End: 2023-03-06

## 2023-03-06 DIAGNOSIS — Z96.1: ICD-10-CM

## 2023-03-06 PROBLEM — H25.12 CATARACT SENILE NUCLEAR SCLEROSIS; LEFT EYE: Status: ACTIVE | Noted: 2023-02-14

## 2023-03-06 PROBLEM — H16.221 DRY EYE SYNDROME K SICCA; RIGHT EYE: Status: ACTIVE | Noted: 2023-02-23

## 2023-03-06 PROCEDURE — 99024 POSTOP FOLLOW-UP VISIT: CPT | Performed by: STUDENT IN AN ORGANIZED HEALTH CARE EDUCATION/TRAINING PROGRAM

## 2023-03-06 ASSESSMENT — KERATOMETRY
OD_K1POWER_DIOPTERS: 39.50
OS_AXISANGLE_DEGREES: 114
OS_K1POWER_DIOPTERS: 39.50
OD_AXISANGLE_DEGREES: 075
OD_K2POWER_DIOPTERS: 40.25
OS_K2POWER_DIOPTERS: 40.00

## 2023-03-06 ASSESSMENT — REFRACTION_MANIFEST
OD_VA1: 20/25
OD_AXIS: 0
OD_SPHERE: -3.25
OS_VA1: 20/20-
OD_CYLINDER: -0.25
OS_VA1: 20/20
OS_SPHERE: -1.25
OS_SPHERE: +0.25
OD_CYLINDER: SPHERE
OS_CYLINDER: -1.00
OS_AXIS: 079
OD_SPHERE: +0.25
OS_AXIS: 61
OD_AXIS: 003
OD_VA1: 20/20
OS_CYLINDER: +0.25

## 2023-03-06 ASSESSMENT — REFRACTION_CURRENTRX
OD_AXIS: 170
OD_VPRISM_DIRECTION: SV
OS_AXIS: 079
OS_OVR_VA: 20/
OS_CYLINDER: +0.75
OS_SPHERE: -1.25
OD_CYLINDER: -0.50
OS_VPRISM_DIRECTION: SV
OD_SPHERE: -2.50
OD_OVR_VA: 20/

## 2023-03-06 ASSESSMENT — REFRACTION_AUTOREFRACTION
OD_AXIS: 137
OS_CYLINDER: -0.75
OD_SPHERE: +0.50
OS_AXIS: 069
OS_SPHERE: 0.00
OD_CYLINDER: -0.50

## 2023-03-06 ASSESSMENT — AXIALLENGTH_DERIVED
OS_AL: 25.848
OS_AL: 25.2206
OD_AL: 26.5727
OD_AL: 24.8941
OS_AL: 24.891

## 2023-03-06 ASSESSMENT — PACHYMETRY
OD_CT_CORRECTION: 2
OD_CT_UM: 517
OS_CT_UM: 498
OS_CT_CORRECTION: 4

## 2023-03-06 ASSESSMENT — SUPERFICIAL PUNCTATE KERATITIS (SPK): OD_SPK: 1+

## 2023-03-06 ASSESSMENT — SPHEQUIV_DERIVED
OS_SPHEQUIV: -0.375
OS_SPHEQUIV: 0.375
OD_SPHEQUIV: -3.375
OD_SPHEQUIV: 0.25
OS_SPHEQUIV: -1.75

## 2023-03-06 ASSESSMENT — VISUAL ACUITY
OD_BCVA: 20/30+1
OS_BCVA: 20/20-1

## 2023-03-06 ASSESSMENT — TONOMETRY
OD_IOP_MMHG: 11
OD_IOP_MMHG: 14

## 2023-03-06 ASSESSMENT — CONFRONTATIONAL VISUAL FIELD TEST (CVF)
OS_FINDINGS: FULL
OD_FINDINGS: FULL

## 2023-03-06 ASSESSMENT — CORNEAL EDEMA CLINICAL DESCRIPTION: OD_CORNEALEDEMA: 1+ 2+

## 2023-03-14 ENCOUNTER — RX ONLY (RX ONLY)
Age: 68
End: 2023-03-14

## 2023-03-14 ENCOUNTER — OFFICE (OUTPATIENT)
Dept: URBAN - METROPOLITAN AREA CLINIC 113 | Facility: CLINIC | Age: 68
Setting detail: OPHTHALMOLOGY
End: 2023-03-14
Payer: MEDICARE

## 2023-03-14 DIAGNOSIS — Z96.1: ICD-10-CM

## 2023-03-14 DIAGNOSIS — H40.1132: ICD-10-CM

## 2023-03-14 PROCEDURE — 99024 POSTOP FOLLOW-UP VISIT: CPT | Performed by: STUDENT IN AN ORGANIZED HEALTH CARE EDUCATION/TRAINING PROGRAM

## 2023-03-14 ASSESSMENT — PACHYMETRY
OS_CT_CORRECTION: 4
OS_CT_UM: 498
OD_CT_CORRECTION: 2
OD_CT_UM: 517

## 2023-03-14 ASSESSMENT — KERATOMETRY
OD_AXISANGLE_DEGREES: 078
OS_K1POWER_DIOPTERS: 39.75
OS_AXISANGLE_DEGREES: 098
OD_K2POWER_DIOPTERS: 40.75
OS_K2POWER_DIOPTERS: 40.25
OD_K1POWER_DIOPTERS: 39.50

## 2023-03-14 ASSESSMENT — REFRACTION_MANIFEST
OS_SPHERE: +0.25
OD_VA1: 20/20
OD_CYLINDER: -0.25
OS_AXIS: 61
OD_SPHERE: -3.25
OD_AXIS: 003
OS_CYLINDER: -1.00
OS_VA1: 20/20
OD_SPHERE: +0.25
OS_VA1: 20/20-
OS_CYLINDER: +0.25
OD_VA1: 20/25
OD_AXIS: 0
OS_SPHERE: -1.25
OD_CYLINDER: SPHERE
OS_AXIS: 079

## 2023-03-14 ASSESSMENT — REFRACTION_AUTOREFRACTION
OS_SPHERE: 0.00
OD_SPHERE: +0.25
OD_AXIS: 156
OS_CYLINDER: -0.50
OS_AXIS: 059
OD_CYLINDER: -0.50

## 2023-03-14 ASSESSMENT — REFRACTION_CURRENTRX
OS_CYLINDER: +0.75
OD_SPHERE: -2.50
OD_AXIS: 170
OD_CYLINDER: -0.50
OD_VPRISM_DIRECTION: SV
OS_SPHERE: -1.25
OS_OVR_VA: 20/
OS_AXIS: 079
OS_VPRISM_DIRECTION: SV
OD_OVR_VA: 20/

## 2023-03-14 ASSESSMENT — VISUAL ACUITY
OS_BCVA: 20/20-1
OD_BCVA: 20/20

## 2023-03-14 ASSESSMENT — TONOMETRY
OS_IOP_MMHG: 11
OD_IOP_MMHG: 16

## 2023-03-14 ASSESSMENT — SPHEQUIV_DERIVED
OD_SPHEQUIV: 0
OS_SPHEQUIV: 0.375
OD_SPHEQUIV: -3.375
OS_SPHEQUIV: -1.75
OS_SPHEQUIV: -0.25

## 2023-03-14 ASSESSMENT — AXIALLENGTH_DERIVED
OS_AL: 25.061
OD_AL: 24.9003
OS_AL: 25.7383
OD_AL: 26.4567
OS_AL: 24.7892

## 2023-03-14 ASSESSMENT — CORNEAL EDEMA CLINICAL DESCRIPTION: OD_CORNEALEDEMA: 1+ 2+

## 2023-03-14 ASSESSMENT — CONFRONTATIONAL VISUAL FIELD TEST (CVF)
OS_FINDINGS: FULL
OD_FINDINGS: FULL

## 2023-03-14 ASSESSMENT — SUPERFICIAL PUNCTATE KERATITIS (SPK): OD_SPK: 1+

## 2023-03-23 ENCOUNTER — APPOINTMENT (OUTPATIENT)
Dept: CARDIOLOGY | Facility: CLINIC | Age: 68
End: 2023-03-23
Payer: MEDICARE

## 2023-03-23 PROCEDURE — A9500: CPT

## 2023-03-23 PROCEDURE — 78452 HT MUSCLE IMAGE SPECT MULT: CPT

## 2023-03-23 PROCEDURE — 93015 CV STRESS TEST SUPVJ I&R: CPT

## 2023-03-23 PROCEDURE — 93306 TTE W/DOPPLER COMPLETE: CPT

## 2023-03-27 ENCOUNTER — NON-APPOINTMENT (OUTPATIENT)
Age: 68
End: 2023-03-27

## 2023-04-04 ENCOUNTER — NON-APPOINTMENT (OUTPATIENT)
Age: 68
End: 2023-04-04

## 2023-04-04 VITALS
SYSTOLIC BLOOD PRESSURE: 121 MMHG | DIASTOLIC BLOOD PRESSURE: 80 MMHG | WEIGHT: 238.1 LBS | OXYGEN SATURATION: 97 % | HEART RATE: 57 BPM | RESPIRATION RATE: 20 BRPM | HEIGHT: 76 IN

## 2023-04-04 RX ORDER — CHLORHEXIDINE GLUCONATE 213 G/1000ML
1 SOLUTION TOPICAL ONCE
Refills: 0 | Status: DISCONTINUED | OUTPATIENT
Start: 2023-04-05 | End: 2023-04-06

## 2023-04-04 NOTE — H&P PST ADULT - NSICDXPASTMEDICALHX_GEN_ALL_CORE_FT
PAST MEDICAL HISTORY:  HTN (hypertension)     Hyperlipidemia     Neck mass posterior    Sprain of other specified parts of unspecified shoulder girdle, initial encounter     Unspecified disorder of synovium and tendon, right shoulder

## 2023-04-04 NOTE — H&P PST ADULT - EYES
Venipuncture Paragraph: An alcohol pad was applied to the venipuncture site. Venipuncture was performed using a 21 gauge. Pressure and a bandaid was applied to the site. No complications were noted.
Detail Level: None
Number Of Tubes Drawn: 4
PERRL/EOMI/conjunctiva clear/normal

## 2023-04-04 NOTE — H&P PST ADULT - HISTORY OF PRESENT ILLNESS
Narrative: This is a 68 yo male with a PMHx Carotid disease, non obstructive CAD HTN, HLD, active tobacco use of 1ppd.     Symptoms:        Angina (Class):        Ischemic Symptoms:     Heart Failure:        Systolic/Diastolic/Combined:        NYHA Class (within 2 weeks):     Assessment of LVEF:       EF:        Assessed by:        Date:     Prior Cardiac Interventions:       PCI's:        CABG:     Noninvasive Testing:   Stress Test: Date:        Protocol:        Duration of Exercise:        Symptoms:        EKG Changes:        DTS:        Myocardial Imaging:        Risk Assessment:     Echo:     Antianginal Therapies:        Beta Blockers:         Calcium Channel Blockers:        Long Acting Nitrates:        Ranexa:     Associated Risk Factors:        Cerebrovascular Disease: N/A       Chronic Lung Disease: N/A       Peripheral Arterial Disease: N/A       Chronic Kidney Disease (if yes, what is GFR): N/A       Uncontrolled Diabetes (if yes, what is HgbA1C or FBS): N/A       Poorly Controlled Hypertension (if yes, what is SBP): N/A       Morbid Obesity (if yes, what is BMI): N/A       History of Recent Ventricular Arrhythmia: N/A       Inability to Ambulate Safely: N/A       Need for Therapeutic Anticoagulation: N/A       Antiplatelet or Contrast Allergy: N/A Narrative: This is a 66 yo male with a PMHx Carotid disease, non obstructive CAD HTN, HLD, intermittent LBBB, active tobacco use of 1ppd. Patient presented to cardiologist with c/o NEAL. Pt sent for a stress test and echocardiogram.  The Echo revealed a drop in EF to 44% and the stress test revealed a small , mild defect in the anteroseptal wall that was minimally reversible, Pt presenting now for a [facility] to assess for ischemic HD.    Symptoms:        Angina (Class): 2       Ischemic Symptoms: NEAL    Heart Failure:        Systolic/Diastolic/Combined: Systolic       NYHA Class (within 2 weeks):     Assessment of LVEF:       EF: 44%        Assessed by: TTE       Date: 3/27/23    Prior Cardiac Interventions:      Noninvasive Testing:   Stress Test: Date:        Protocol: Regadenoson Sestamibi       Duration of Exercise: NA       Symptoms: Shortness of Breath       EKG Changes: LBBB and frequent PVCs       DTS:        Myocardial Imaging: mild defect in the anteroseptal wall that was minimally reversible, suggestive of no clear evidence of ischemia or infarct consistent with artifact due to LBBB. Small mild defect in inferior wall that is fixed suggestive of diaphragmatic attenuation artifact.  Global hypokinesis without regional wall motion abnormalities suggestive of non ischemic CM.  LV dysfunction.        Risk Assessment:     Echo: Elevated mean LA pressure  Moderately enlarged LVH  Abnormal septal motion consistent with LBBB  EF 44%  RA is normal in size  RV normal size and function  Mild mitral valve regurgitation  Moderate aortic stenosis, mild aortic regurgitation      Antianginal Therapies:        Beta Blockers:         Calcium Channel Blockers:        Long Acting Nitrates:        Ranexa:     Associated Risk Factors:        Cerebrovascular Disease: N/A       Chronic Lung Disease: N/A       Peripheral Arterial Disease: N/A       Chronic Kidney Disease (if yes, what is GFR): N/A       Uncontrolled Diabetes (if yes, what is HgbA1C or FBS): N/A       Poorly Controlled Hypertension (if yes, what is SBP): N/A       Morbid Obesity (if yes, what is BMI): N/A       History of Recent Ventricular Arrhythmia: N/A       Inability to Ambulate Safely: N/A       Need for Therapeutic Anticoagulation: N/A       Antiplatelet or Contrast Allergy: N/A Narrative: This is a 68 yo male with a PMHx Carotid disease, non obstructive CAD HTN, HLD, intermittent LBBB, active tobacco use of 1ppd. Patient presented to cardiologist with c/o NEAL. Pt sent for a stress test and echocardiogram.  The Echo revealed a drop in EF to 44% and the stress test revealed a small , mild defect in the anteroseptal wall that was minimally reversible, Pt presenting now for a [facility] to assess for ischemic HD.    Symptoms:        Angina (Class): 2       Ischemic Symptoms: NEAL    Heart Failure:        Systolic/Diastolic/Combined: Systolic       NYHA Class (within 2 weeks):     Assessment of LVEF:       EF: 44%        Assessed by: TTE       Date: 3/27/23    Prior Cardiac Interventions:      Noninvasive Testing:   Stress Test: Date:        Protocol: Regadenoson Sestamibi       Duration of Exercise: NA       Symptoms: Shortness of Breath       EKG Changes: LBBB and frequent PVCs       DTS:        Myocardial Imaging: mild defect in the anteroseptal wall that was minimally reversible, suggestive of no clear evidence of ischemia or infarct consistent with artifact due to LBBB. Small mild defect in inferior wall that is fixed suggestive of diaphragmatic attenuation artifact.  Global hypokinesis without regional wall motion abnormalities suggestive of non ischemic CM.  LV dysfunction.        Risk Assessment:     Echo: Elevated mean LA pressure  Moderately enlarged LVH  Abnormal septal motion consistent with LBBB  EF 44%  RA is normal in size  RV normal size and function  Mild mitral valve regurgitation  Moderate aortic stenosis, mild aortic regurgitation      Antianginal Therapies: None       Beta Blockers:         Calcium Channel Blockers:        Long Acting Nitrates:        Ranexa:     Associated Risk Factors:        Cerebrovascular Disease: N/A       Chronic Lung Disease: N/A       Peripheral Arterial Disease: N/A       Chronic Kidney Disease (if yes, what is GFR): N/A       Uncontrolled Diabetes (if yes, what is HgbA1C or FBS): N/A       Poorly Controlled Hypertension (if yes, what is SBP): YES       Morbid Obesity (if yes, what is BMI): N/A       History of Recent Ventricular Arrhythmia: N/A       Inability to Ambulate Safely: N/A       Need for Therapeutic Anticoagulation: N/A       Antiplatelet or Contrast Allergy: N/A Narrative: This is a 66 yo male with a PMHx Carotid disease, non obstructive CAD HTN, HLD, intermittent LBBB, active tobacco use of 1ppd. Patient presented to cardiologist with c/o increased fatigue. Pt sent for a stress test and echocardiogram.  The Echo revealed a drop in EF to 44% and the stress test revealed a small , mild defect in the anteroseptal wall that was minimally reversible, Pt presenting now for a [facility] to assess for ischemic HD.    Symptoms:        Angina (Class): 2       Ischemic Symptoms: Fatigue    Heart Failure:        Systolic/Diastolic/Combined: Systolic       NYHA Class (within 2 weeks):     Assessment of LVEF:        EF: 44%        Assessed by: TTE       Date: 3/27/23    Prior Cardiac Interventions: None      Noninvasive Testing:   Stress Test: Date:        Protocol: Regadenoson Sestamibi       Duration of Exercise: NA       Symptoms: Shortness of Breath       EKG Changes: LBBB and frequent PVCs       DTS:        Myocardial Imaging: mild defect in the anteroseptal wall that was minimally reversible, suggestive of no clear evidence of ischemia or infarct consistent with artifact due to LBBB. Small mild defect in inferior wall that is fixed suggestive of diaphragmatic attenuation artifact.  Global hypokinesis without regional wall motion abnormalities suggestive of non ischemic CM.  LV dysfunction.        Risk Assessment:     Echo: Elevated mean LA pressure  Moderately enlarged LVH  Abnormal septal motion consistent with LBBB  EF 44%  RA is normal in size  RV normal size and function  Mild mitral valve regurgitation  Moderate aortic stenosis, mild aortic regurgitation      Antianginal Therapies: None       Beta Blockers:  Carvedilol       Calcium Channel Blockers:        Long Acting Nitrates:        Ranexa:     Associated Risk Factors:        Cerebrovascular Disease: N/A       Chronic Lung Disease: N/A       Peripheral Arterial Disease: N/A       Chronic Kidney Disease (if yes, what is GFR): N/A       Uncontrolled Diabetes (if yes, what is HgbA1C or FBS): N/A       Poorly Controlled Hypertension (if yes, what is SBP): YES       Morbid Obesity (if yes, what is BMI): N/A       History of Recent Ventricular Arrhythmia: N/A       Inability to Ambulate Safely: N/A       Need for Therapeutic Anticoagulation: N/A       Antiplatelet or Contrast Allergy: N/A

## 2023-04-04 NOTE — H&P PST ADULT - ASSESSMENT
68 yo male for a LHC secondary to abn stress test and drop in EF    ASA  Mallampati  GFR  Creat  Bleeding Risk     Plan: PRE-PROCEDURE ASSESSMENT    -Patient seen and examined  -Labs reviewed  -Pre-procedure teaching completed with patient   -Questions answered about patients concerns     -instructed to NPO after midnight.   - Pt instructed to have escort to and from procedure   -pt instructed IF STENT PLACED WILL REQUIRE TO STAY OVERNIGHT FOR MONITORING AND DISCHARGED IN THE AM .   - BC GFR is  and has had a history of decompensated CHF a BOLUS OF NS at 1ml/KG/hr will be initiated prior to the cath   - and advised to have GFR repeated 2-5 days post Cardiac Cath    68 yo male for a LHC secondary to abn stress test and drop in EF    ASA 2  Mallampati 2  GFR  Creat  Bleeding Risk     Plan: PRE-PROCEDURE ASSESSMENT    -Patient seen and examined  -Labs reviewed  -Pre-procedure teaching completed with patient   -Questions answered about patients concerns     -instructed to NPO after midnight.   - Pt instructed to have escort to and from procedure   -pt instructed IF STENT PLACED WILL REQUIRE TO STAY OVERNIGHT FOR MONITORING AND DISCHARGED IN THE AM .   - BC GFR is  and has had a history of decompensated CHF a BOLUS OF NS at 1ml/KG/hr will be initiated prior to the cath   - and advised to have GFR repeated 2-5 days post Cardiac Cath    68 yo male for a LHC secondary to abn stress test and drop in EF    ASA 2  Mallampati 2  GFR 94  Creat 0.89  Bleeding Risk     Plan: PRE-PROCEDURE ASSESSMENT    -Patient seen and examined  -Labs reviewed  -Pre-procedure teaching completed with patient   -Questions answered about patients concerns     -instructed to NPO after midnight.   - Pt instructed to have escort to and from procedure   -pt instructed IF STENT PLACED WILL REQUIRE TO STAY OVERNIGHT FOR MONITORING AND DISCHARGED IN THE AM .   - BC GFR is  and has had a history of decompensated CHF a BOLUS OF NS at 1ml/KG/hr will be initiated prior to the cath   - and advised to have GFR repeated 2-5 days post Cardiac Cath

## 2023-04-05 ENCOUNTER — INPATIENT (INPATIENT)
Facility: HOSPITAL | Age: 68
LOS: 0 days | Discharge: HOME | DRG: 247 | End: 2023-04-06
Attending: INTERNAL MEDICINE | Admitting: INTERNAL MEDICINE
Payer: MEDICARE

## 2023-04-05 ENCOUNTER — TRANSCRIPTION ENCOUNTER (OUTPATIENT)
Age: 68
End: 2023-04-05

## 2023-04-05 DIAGNOSIS — I44.7 LEFT BUNDLE-BRANCH BLOCK, UNSPECIFIED: ICD-10-CM

## 2023-04-05 DIAGNOSIS — Z98.890 OTHER SPECIFIED POSTPROCEDURAL STATES: Chronic | ICD-10-CM

## 2023-04-05 DIAGNOSIS — I42.9 CARDIOMYOPATHY, UNSPECIFIED: ICD-10-CM

## 2023-04-05 LAB
ANION GAP SERPL CALC-SCNC: 10 MMOL/L — SIGNIFICANT CHANGE UP (ref 5–17)
BASOPHILS # BLD AUTO: 0.04 K/UL — SIGNIFICANT CHANGE UP (ref 0–0.2)
BASOPHILS NFR BLD AUTO: 0.5 % — SIGNIFICANT CHANGE UP (ref 0–2)
BUN SERPL-MCNC: 19.1 MG/DL — SIGNIFICANT CHANGE UP (ref 8–20)
CALCIUM SERPL-MCNC: 8.6 MG/DL — SIGNIFICANT CHANGE UP (ref 8.4–10.5)
CHLORIDE SERPL-SCNC: 104 MMOL/L — SIGNIFICANT CHANGE UP (ref 96–108)
CO2 SERPL-SCNC: 25 MMOL/L — SIGNIFICANT CHANGE UP (ref 22–29)
CREAT SERPL-MCNC: 0.89 MG/DL — SIGNIFICANT CHANGE UP (ref 0.5–1.3)
EGFR: 94 ML/MIN/1.73M2 — SIGNIFICANT CHANGE UP
EOSINOPHIL # BLD AUTO: 0 K/UL — SIGNIFICANT CHANGE UP (ref 0–0.5)
EOSINOPHIL NFR BLD AUTO: 0 % — SIGNIFICANT CHANGE UP (ref 0–6)
GLUCOSE SERPL-MCNC: 86 MG/DL — SIGNIFICANT CHANGE UP (ref 70–99)
HCT VFR BLD CALC: 50 % — SIGNIFICANT CHANGE UP (ref 39–50)
HGB BLD-MCNC: 17 G/DL — SIGNIFICANT CHANGE UP (ref 13–17)
IMM GRANULOCYTES NFR BLD AUTO: 0.4 % — SIGNIFICANT CHANGE UP (ref 0–0.9)
LYMPHOCYTES # BLD AUTO: 1.47 K/UL — SIGNIFICANT CHANGE UP (ref 1–3.3)
LYMPHOCYTES # BLD AUTO: 18.2 % — SIGNIFICANT CHANGE UP (ref 13–44)
MCHC RBC-ENTMCNC: 31.1 PG — SIGNIFICANT CHANGE UP (ref 27–34)
MCHC RBC-ENTMCNC: 34 GM/DL — SIGNIFICANT CHANGE UP (ref 32–36)
MCV RBC AUTO: 91.6 FL — SIGNIFICANT CHANGE UP (ref 80–100)
MONOCYTES # BLD AUTO: 1.04 K/UL — HIGH (ref 0–0.9)
MONOCYTES NFR BLD AUTO: 12.9 % — SIGNIFICANT CHANGE UP (ref 2–14)
NEUTROPHILS # BLD AUTO: 5.49 K/UL — SIGNIFICANT CHANGE UP (ref 1.8–7.4)
NEUTROPHILS NFR BLD AUTO: 68 % — SIGNIFICANT CHANGE UP (ref 43–77)
PLATELET # BLD AUTO: 245 K/UL — SIGNIFICANT CHANGE UP (ref 150–400)
POTASSIUM SERPL-MCNC: 4.3 MMOL/L — SIGNIFICANT CHANGE UP (ref 3.5–5.3)
POTASSIUM SERPL-SCNC: 4.3 MMOL/L — SIGNIFICANT CHANGE UP (ref 3.5–5.3)
RBC # BLD: 5.46 M/UL — SIGNIFICANT CHANGE UP (ref 4.2–5.8)
RBC # FLD: 13.4 % — SIGNIFICANT CHANGE UP (ref 10.3–14.5)
SARS-COV-2 RNA SPEC QL NAA+PROBE: SIGNIFICANT CHANGE UP
SODIUM SERPL-SCNC: 139 MMOL/L — SIGNIFICANT CHANGE UP (ref 135–145)
WBC # BLD: 8.07 K/UL — SIGNIFICANT CHANGE UP (ref 3.8–10.5)
WBC # FLD AUTO: 8.07 K/UL — SIGNIFICANT CHANGE UP (ref 3.8–10.5)

## 2023-04-05 PROCEDURE — 99152 MOD SED SAME PHYS/QHP 5/>YRS: CPT

## 2023-04-05 PROCEDURE — 93454 CORONARY ARTERY ANGIO S&I: CPT | Mod: 26,XU

## 2023-04-05 PROCEDURE — 93010 ELECTROCARDIOGRAM REPORT: CPT | Mod: 76

## 2023-04-05 PROCEDURE — 92928 PRQ TCAT PLMT NTRAC ST 1 LES: CPT | Mod: RI

## 2023-04-05 RX ORDER — VALSARTAN 80 MG/1
1 TABLET ORAL
Refills: 0 | DISCHARGE

## 2023-04-05 RX ORDER — EMPAGLIFLOZIN 10 MG/1
1 TABLET, FILM COATED ORAL
Refills: 0 | DISCHARGE

## 2023-04-05 RX ORDER — ALPRAZOLAM 0.25 MG
0.5 TABLET ORAL
Refills: 0 | Status: DISCONTINUED | OUTPATIENT
Start: 2023-04-05 | End: 2023-04-06

## 2023-04-05 RX ORDER — ASPIRIN/CALCIUM CARB/MAGNESIUM 324 MG
81 TABLET ORAL DAILY
Refills: 0 | Status: DISCONTINUED | OUTPATIENT
Start: 2023-04-05 | End: 2023-04-06

## 2023-04-05 RX ORDER — LANOLIN ALCOHOL/MO/W.PET/CERES
5 CREAM (GRAM) TOPICAL AT BEDTIME
Refills: 0 | Status: DISCONTINUED | OUTPATIENT
Start: 2023-04-05 | End: 2023-04-06

## 2023-04-05 RX ORDER — SODIUM CHLORIDE 9 MG/ML
250 INJECTION INTRAMUSCULAR; INTRAVENOUS; SUBCUTANEOUS ONCE
Refills: 0 | Status: DISCONTINUED | OUTPATIENT
Start: 2023-04-05 | End: 2023-04-06

## 2023-04-05 RX ORDER — VALSARTAN 80 MG/1
80 TABLET ORAL DAILY
Refills: 0 | Status: DISCONTINUED | OUTPATIENT
Start: 2023-04-05 | End: 2023-04-06

## 2023-04-05 RX ORDER — SPIRONOLACTONE 25 MG/1
25 TABLET, FILM COATED ORAL DAILY
Refills: 0 | Status: DISCONTINUED | OUTPATIENT
Start: 2023-04-05 | End: 2023-04-06

## 2023-04-05 RX ORDER — CLOPIDOGREL BISULFATE 75 MG/1
75 TABLET, FILM COATED ORAL DAILY
Refills: 0 | Status: DISCONTINUED | OUTPATIENT
Start: 2023-04-06 | End: 2023-04-06

## 2023-04-05 RX ORDER — CARVEDILOL PHOSPHATE 80 MG/1
3.12 CAPSULE, EXTENDED RELEASE ORAL EVERY 12 HOURS
Refills: 0 | Status: DISCONTINUED | OUTPATIENT
Start: 2023-04-05 | End: 2023-04-06

## 2023-04-05 RX ORDER — SPIRONOLACTONE 25 MG/1
1 TABLET, FILM COATED ORAL
Refills: 0 | DISCHARGE

## 2023-04-05 RX ADMIN — Medication 5 MILLIGRAM(S): at 21:37

## 2023-04-05 RX ADMIN — CARVEDILOL PHOSPHATE 3.12 MILLIGRAM(S): 80 CAPSULE, EXTENDED RELEASE ORAL at 18:35

## 2023-04-05 RX ADMIN — Medication 81 MILLIGRAM(S): at 14:13

## 2023-04-05 NOTE — DISCHARGE NOTE PROVIDER - HOSPITAL COURSE
Narrative: This is a 66 yo male with a PMHx Carotid disease, non obstructive CAD HTN, HLD, intermittent LBBB, active tobacco use of 1ppd. Patient presented to cardiologist with c/o increased fatigue. Pt sent for a stress test and echocardiogram.  The Echo revealed a drop in EF to 44% and the stress test revealed a small , mild defect in the anteroseptal wall that was minimally reversible, Pt presenting now for a [facility] to assess for ischemic HD.    (04 Apr 2023 18:21)    now s/p C with succesful PCI and PRAKASH to the Ramus    ASSESSMENT/PLAN:    Coronary artery disease  -Admitted to telemetry overnight for observation  -VS, labs, diet, activity reviewed  -Aspirin 81 mg PO daily  -Plavix 75mg PO daily  -Carvedilol 3.25 mg BID  -Valsartan 80 mg PO daily  -atorvastatin 10mg PO QHS   -Plan of care discussed with patient, family and MD  -Cardiac rehab info provided/referral and communication to cardiac rehab completed  -Follow-up with attending Dr Hernandes  within 1 week  -Discussed therapeutic lifestyle changes to reduce risk factors such as following a cardiac diet, weight loss, maintaining a healthy weight, exercise, smoking cessation, medication compliance, and regular follow-up  with MD to know your numbers (BP, cholesterol, weight, and glucose)

## 2023-04-05 NOTE — DISCHARGE NOTE PROVIDER - NSDCFUADDAPPT_GEN_ALL_CORE_FT
Please call and make an appointment with Dr. Hernandes in 1-2 weeks for post procedure, hospital follow up.

## 2023-04-05 NOTE — DISCHARGE NOTE PROVIDER - NSDCFUSCHEDAPPT_GEN_ALL_CORE_FT
[facility]  Scheduled Appointment: 06/23/2023    Siva Evans  [place] [address] [address] [place]  Scheduled Appointment: 06/23/2023

## 2023-04-05 NOTE — DISCHARGE NOTE PROVIDER - NSDCCPCAREPLAN_GEN_ALL_CORE_FT
PRINCIPAL DISCHARGE DIAGNOSIS  Diagnosis: CAD (coronary artery disease)  Assessment and Plan of Treatment: Coronary artery disease is the buildup of plaque in the arteries that supply oxygen-rich blood to your heart. Plaque causes a narrowing or blockage that could result in a heart attack. Symptoms include chest pain or discomfort, shortness of breath, dizziness, palpitations, fatigue or reduced exercise tolerance. .  Go to the ED with any acute onset of chest pain, palpitations, shortness of breath or dizziness. Do NOT miss a dose or stop taking your Aspirin and Plavix, these medications keep your stent open and prevent a heart attack. If anyone tells you to stop these medications, speak to your cardiologist immediately.  Managing risk factors will help keep your stent open and prevent future blockages, risk factors may include: high blood pressure, high cholesterol, diabetes, obesity, sedentary life style and smoking.    Your diet should be low in fat, cholesterol, salt and carbohydrates, increase fruits (caution if diabetic), vegetables and whole grains/fiber rich foods.   Take all your cardiac  medications as prescribed.    Exercise is a very important factor in heart health. Once your post procedure restrictions have passed, you should engage in heart healthy, aerobic exercise. Be sure to have clearance from your cardiologist. Cardiac rehab programs could be extremely beneficial and your cardiologist could help set this up.   Follow up with your cardiologist within 1-2 weeks after your procedure.   Call your cardiologist or our unit ([phone]) with any questions or concerns that may arise.

## 2023-04-05 NOTE — DISCHARGE NOTE PROVIDER - CARE PROVIDER_API CALL
Luis F Hernandes)  Cardiology; Internal Medicine  [address] [place] [address], [address] [id number]  Phone: [phone]  Fax: [phone]  Follow Up Time: 2 weeks

## 2023-04-05 NOTE — DISCHARGE NOTE PROVIDER - NSDCMRMEDTOKEN_GEN_ALL_CORE_FT
carvedilol 3.125 mg oral tablet: 1 orally 2 times a day  Diovan 80 mg oral tablet: 1 orally  Jardiance 10 mg oral tablet: 1 orally  spironolactone 25 mg oral tablet: 1 orally   aspirin 81 mg oral tablet, chewable: 1 tab(s) orally once a day  carvedilol 3.125 mg oral tablet: 1 orally 2 times a day  clopidogrel 75 mg oral tablet: 1 tab(s) orally once a day  Diovan 80 mg oral tablet: 1 orally  Jardiance 10 mg oral tablet: 1 orally  spironolactone 25 mg oral tablet: 1 orally   aspirin 81 mg oral tablet, chewable: 1 tab(s) orally once a day  atorvastatin 40 mg oral tablet: 1 tab(s) orally once a day (at bedtime)  carvedilol 3.125 mg oral tablet: 1 orally 2 times a day  clopidogrel 75 mg oral tablet: 1 tab(s) orally once a day  Diovan 80 mg oral tablet: 1 orally  Jardiance 10 mg oral tablet: 1 orally  spironolactone 25 mg oral tablet: 1 orally   aspirin 81 mg oral tablet, chewable: 1 tab(s) orally once a day  atorvastatin 40 mg oral tablet: 1 tab(s) orally once a day (at bedtime)  clopidogrel 75 mg oral tablet: 1 tab(s) orally once a day  Diovan 80 mg oral tablet: 1 orally  Jardiance 10 mg oral tablet: 1 orally  spironolactone 25 mg oral tablet: 1 orally

## 2023-04-05 NOTE — DISCHARGE NOTE PROVIDER - CARE PROVIDERS DIRECT ADDRESSES
[email or web address]

## 2023-04-06 ENCOUNTER — TRANSCRIPTION ENCOUNTER (OUTPATIENT)
Age: 68
End: 2023-04-06

## 2023-04-06 VITALS
RESPIRATION RATE: 15 BRPM | OXYGEN SATURATION: 96 % | DIASTOLIC BLOOD PRESSURE: 71 MMHG | HEART RATE: 45 BPM | SYSTOLIC BLOOD PRESSURE: 124 MMHG

## 2023-04-06 LAB
ANION GAP SERPL CALC-SCNC: 10 MMOL/L — SIGNIFICANT CHANGE UP (ref 5–17)
BASOPHILS # BLD AUTO: 0.03 K/UL — SIGNIFICANT CHANGE UP (ref 0–0.2)
BASOPHILS NFR BLD AUTO: 0.5 % — SIGNIFICANT CHANGE UP (ref 0–2)
BUN SERPL-MCNC: 23.6 MG/DL — HIGH (ref 8–20)
CALCIUM SERPL-MCNC: 8.6 MG/DL — SIGNIFICANT CHANGE UP (ref 8.4–10.5)
CHLORIDE SERPL-SCNC: 107 MMOL/L — SIGNIFICANT CHANGE UP (ref 96–108)
CO2 SERPL-SCNC: 22 MMOL/L — SIGNIFICANT CHANGE UP (ref 22–29)
CREAT SERPL-MCNC: 0.96 MG/DL — SIGNIFICANT CHANGE UP (ref 0.5–1.3)
EGFR: 87 ML/MIN/1.73M2 — SIGNIFICANT CHANGE UP
EOSINOPHIL # BLD AUTO: 0 K/UL — SIGNIFICANT CHANGE UP (ref 0–0.5)
EOSINOPHIL NFR BLD AUTO: 0 % — SIGNIFICANT CHANGE UP (ref 0–6)
GLUCOSE SERPL-MCNC: 112 MG/DL — HIGH (ref 70–99)
HCT VFR BLD CALC: 46.6 % — SIGNIFICANT CHANGE UP (ref 39–50)
HGB BLD-MCNC: 16.1 G/DL — SIGNIFICANT CHANGE UP (ref 13–17)
IMM GRANULOCYTES NFR BLD AUTO: 0.5 % — SIGNIFICANT CHANGE UP (ref 0–0.9)
LYMPHOCYTES # BLD AUTO: 1.44 K/UL — SIGNIFICANT CHANGE UP (ref 1–3.3)
LYMPHOCYTES # BLD AUTO: 22.6 % — SIGNIFICANT CHANGE UP (ref 13–44)
MAGNESIUM SERPL-MCNC: 2.2 MG/DL — SIGNIFICANT CHANGE UP (ref 1.6–2.6)
MCHC RBC-ENTMCNC: 31.3 PG — SIGNIFICANT CHANGE UP (ref 27–34)
MCHC RBC-ENTMCNC: 34.5 GM/DL — SIGNIFICANT CHANGE UP (ref 32–36)
MCV RBC AUTO: 90.5 FL — SIGNIFICANT CHANGE UP (ref 80–100)
MONOCYTES # BLD AUTO: 0.89 K/UL — SIGNIFICANT CHANGE UP (ref 0–0.9)
MONOCYTES NFR BLD AUTO: 14 % — SIGNIFICANT CHANGE UP (ref 2–14)
NEUTROPHILS # BLD AUTO: 3.98 K/UL — SIGNIFICANT CHANGE UP (ref 1.8–7.4)
NEUTROPHILS NFR BLD AUTO: 62.4 % — SIGNIFICANT CHANGE UP (ref 43–77)
PLATELET # BLD AUTO: 222 K/UL — SIGNIFICANT CHANGE UP (ref 150–400)
POTASSIUM SERPL-MCNC: 4.2 MMOL/L — SIGNIFICANT CHANGE UP (ref 3.5–5.3)
POTASSIUM SERPL-SCNC: 4.2 MMOL/L — SIGNIFICANT CHANGE UP (ref 3.5–5.3)
RBC # BLD: 5.15 M/UL — SIGNIFICANT CHANGE UP (ref 4.2–5.8)
RBC # FLD: 13.4 % — SIGNIFICANT CHANGE UP (ref 10.3–14.5)
SODIUM SERPL-SCNC: 139 MMOL/L — SIGNIFICANT CHANGE UP (ref 135–145)
WBC # BLD: 6.37 K/UL — SIGNIFICANT CHANGE UP (ref 3.8–10.5)
WBC # FLD AUTO: 6.37 K/UL — SIGNIFICANT CHANGE UP (ref 3.8–10.5)

## 2023-04-06 PROCEDURE — C1725: CPT

## 2023-04-06 PROCEDURE — 85025 COMPLETE CBC W/AUTO DIFF WBC: CPT

## 2023-04-06 PROCEDURE — C1894: CPT

## 2023-04-06 PROCEDURE — C1769: CPT

## 2023-04-06 PROCEDURE — 93005 ELECTROCARDIOGRAM TRACING: CPT

## 2023-04-06 PROCEDURE — U0003: CPT

## 2023-04-06 PROCEDURE — C1887: CPT

## 2023-04-06 PROCEDURE — 83735 ASSAY OF MAGNESIUM: CPT

## 2023-04-06 PROCEDURE — 93454 CORONARY ARTERY ANGIO S&I: CPT | Mod: XU

## 2023-04-06 PROCEDURE — 93010 ELECTROCARDIOGRAM REPORT: CPT

## 2023-04-06 PROCEDURE — 80048 BASIC METABOLIC PNL TOTAL CA: CPT

## 2023-04-06 PROCEDURE — U0005: CPT

## 2023-04-06 PROCEDURE — 36415 COLL VENOUS BLD VENIPUNCTURE: CPT

## 2023-04-06 PROCEDURE — C9600: CPT | Mod: RI

## 2023-04-06 PROCEDURE — C1874: CPT

## 2023-04-06 RX ORDER — ATORVASTATIN CALCIUM 80 MG/1
40 TABLET, FILM COATED ORAL AT BEDTIME
Refills: 0 | Status: DISCONTINUED | OUTPATIENT
Start: 2023-04-06 | End: 2023-04-06

## 2023-04-06 RX ORDER — ATORVASTATIN CALCIUM 80 MG/1
1 TABLET, FILM COATED ORAL
Qty: 90 | Refills: 3
Start: 2023-04-06 | End: 2024-03-30

## 2023-04-06 RX ORDER — CARVEDILOL PHOSPHATE 80 MG/1
1 CAPSULE, EXTENDED RELEASE ORAL
Refills: 0 | DISCHARGE

## 2023-04-06 RX ADMIN — VALSARTAN 80 MILLIGRAM(S): 80 TABLET ORAL at 06:23

## 2023-04-06 RX ADMIN — SPIRONOLACTONE 25 MILLIGRAM(S): 25 TABLET, FILM COATED ORAL at 06:23

## 2023-04-06 RX ADMIN — CLOPIDOGREL BISULFATE 75 MILLIGRAM(S): 75 TABLET, FILM COATED ORAL at 08:07

## 2023-04-06 RX ADMIN — Medication 81 MILLIGRAM(S): at 08:07

## 2023-04-06 NOTE — PROGRESS NOTE ADULT - SUBJECTIVE AND OBJECTIVE BOX
Nurse Practitioner Progress note:   s/p [facility] with successful PCI and PRAKASH to the Ramus with 1 PRAKASH for an 80% stenosis. done by Dr Sutton      Patient feels well.  Denies chest pain, shortness of breath, dizziness or palpitations at this time    Right groin procedure site CDI.  no bleeding, no hematoma, site soft, non tender, positive pedal pulses bilaterally  Right radial hemoband in place.  Procedure site CDI, no bleeding, no hematoma, able to move all digits with capillary refill < 3 seconds, fingers warm      T(C): 36.9 (04-05-23 @ 13:23), Max: 36.9 (04-05-23 @ 13:23)  HR: 62 (04-05-23 @ 16:45) (52 - 62)  BP: 140/72 (04-05-23 @ 16:45) (121/80 - 140/72)  RR: 14 (04-05-23 @ 16:45) (12 - 20)  SpO2: 99% (04-05-23 @ 16:45) (97% - 100%)    CBC Full  -  ( 05 Apr 2023 01:14 )  WBC Count : 8.07 K/uL  RBC Count : 5.46 M/uL  Hemoglobin : 17.0 g/dL  Hematocrit : 50.0 %  Platelet Count - Automated : 245 K/uL  Mean Cell Volume : 91.6 fl  Mean Cell Hemoglobin : 31.1 pg  Mean Cell Hemoglobin Concentration : 34.0 gm/dL  Auto Neutrophil # : 5.49 K/uL  Auto Lymphocyte # : 1.47 K/uL  Auto Monocyte # : 1.04 K/uL  Auto Eosinophil # : 0.00 K/uL  Auto Basophil # : 0.04 K/uL  Auto Neutrophil % : 68.0 %  Auto Lymphocyte % : 18.2 %  Auto Monocyte % : 12.9 %  Auto Eosinophil % : 0.0 %  Auto Basophil % : 0.5 %    04-05    139  |  104  |  19.1  ----------------------------<  86  4.3   |  25.0  |  0.89    Ca    8.6      05 Apr 2023 01:14      EKG: Sinus Bradycardia with LBBB        MEDICATIONS  (STANDING):  aspirin  chewable 81 milliGRAM(s) Oral daily  chlorhexidine 4% Liquid 1 Application(s) Topical Once      HPI:  Narrative: This is a 66 yo male with a PMHx Carotid disease, non obstructive CAD HTN, HLD, intermittent LBBB, active tobacco use of 1ppd. Patient presented to cardiologist with c/o increased fatigue. Pt sent for a stress test and echocardiogram.  The Echo revealed a drop in EF to 44% and the stress test revealed a small , mild defect in the anteroseptal wall that was minimally reversible, Pt presenting now for a [facility] to assess for ischemic HD.    (04 Apr 2023 18:21)    now s/p [facility] with succesful PCI and PRAKASH to the Ramus    ASSESSMENT/PLAN:    Coronary artery disease  -Admit to telemetry overnight for observation  -VS, labs, diet, activity as per PCI orders  -IV hydration  -Encourage PO fluids  -Aspirin mg PO daily  -Plavix 75mg PO daily  -Carvedilol 3.25 mg BID  -Valsartan 80 mg PO daily  -atorvastatin 10mg PO QHS   -Plan of care discussed with patient, family and MD  -likely d/c in AM if patient remains stable overnight  -NP to see in AM to evaluate labs, EKG and procedure site check  -Cardiac rehab info provided/referral and communication to cardiac rehab completed  -Follow-up with attending MD   within 1 week  -Discussed therapeutic lifestyle changes to reduce risk factors such as following a cardiac diet, weight loss, maintaining a healthy weight, exercise, smoking cessation, medication compliance, and regular follow-up  with MD to know your numbers (BP, cholesterol, weight, and glucose)
                                                                        Department of Cardiology                                                                  [facility]/[place] [address] [address] [facility]                                                            Telephone: [phone]. Fax:[phone]                                                                             Cardiology Progress Note      Cardiology following for CAD; s/p [facility] 4/5/23   Update: No acute events overnight, NAD and HDS, SB on telemetry to    Plan d/c home today with outpatient follow up          	  MEDICATIONS:  carvedilol 3.125 milliGRAM(s) Oral every 12 hours  spironolactone 25 milliGRAM(s) Oral daily  valsartan 80 milliGRAM(s) Oral daily  ALPRAZolam 0.5 milliGRAM(s) Oral two times a day PRN  melatonin 5 milliGRAM(s) Oral at bedtime  aspirin  chewable 81 milliGRAM(s) Oral daily  chlorhexidine 4% Liquid 1 Application(s) Topical Once  clopidogrel Tablet 75 milliGRAM(s) Oral daily  sodium chloride 0.9% Bolus 250 milliLiter(s) IV Bolus once      ROS:    Denies chest pain, sob, lightheadedness, dizziness, HA, visual changes, n/v/c/d, abdominal pain, le edema or weakness, sick contacts or recent travel.     PHYSICAL EXAM:  General: No distress. Comfortable.  HEENT: EOM intact.  Neck: Neck supple. JVP not elevated. No masses  Chest: Clear to auscultation bilaterally  CV: s1 s2 RRR no murmurs, rubs or gallops   Abdomen: Soft, non-distended, non-tender  Extremity : + PP no c/c/e   Skin: No rashes or skin breakdown  Neurology: Alert and oriented times three. Sensation intact  Psych: Affect normal      T(C): 36.5 (04-06-23 @ 06:18), Max: 36.9 (04-05-23 @ 13:23)  HR: 45 (04-06-23 @ 07:30) (45 - 65)  BP: 124/71 (04-06-23 @ 07:30) (11/68 - 142/77)  RR: 15 (04-06-23 @ 07:30) (12 - 17)  SpO2: 96% (04-06-23 @ 07:30) (95% - 100%)  Wt(kg): --      I&O's Summary    05 Apr 2023 07:01 - 06 Apr 2023 07:00  --------------------------------------------------------  IN: 60 mL / OUT: 0 mL / NET: 60 mL          TELEMETRY: 	 SB to 35 overnight; SB 45-52      ECG:  	SB with occasional PVCs     LABS:	 	                      16.1   6.37  )-----------( 222      ( 06 Apr 2023 06:30 )             46.6     04-06    139  |  107  |  23.6<H>  ----------------------------<  112<H>  4.2   |  22.0  |  0.96    Ca    8.6      06 Apr 2023 06:30  Mg     2.2     04-06

## 2023-04-06 NOTE — PROGRESS NOTE ADULT - ASSESSMENT
66 yo male for a LHC secondary to abn stress test and drop in EF. Now s/p LHC which required 1 PRAKASH to Ramus (80% stenosis).       -VSS, SB on telemetry overnight, no s/s  -EKG with SB 47 and occasional PVC   -s/p LHC with PRAKASH to Ramus  -Continue current medical therapy; ARB and statin   -Dual anti platelet therapy with Aspirin and Plavix   -Cont statin therapy with Atorvastatin 40 mg at HS   -Educated regarding strict adherence with DAPT (Aspirin and Plavix)   -Educated regarding post procedure management and care  -Discussed the importance of Risk Factor modification  -Cardiac rehab info provided/referral and communication to cardiac rehab completed  -F/U outpt in 1-2 weeks with Cardiologist  Dr. Hernandes              66 yo male for a LHC secondary to abn stress test and drop in EF. Now s/p LHC which required 1 PRAKASH to Ramus (80% stenosis).       -VSS, SB on telemetry overnight, no s/s  -EKG with SB 47 and occasional PVC   -s/p LHC with PRAKASH to Ramus  -Dual anti platelet therapy with Aspirin and Plavix   -SB to 35 overnight; HR 45-50 while awake, will HOLD Coreg at this time (discussed with Dr. Hernandes)  -Continue current medical therapy; ARB and statin   -Cont statin therapy with Atorvastatin 40 mg at HS   -Educated regarding strict adherence with DAPT (Aspirin and Plavix)   -Educated regarding post procedure management and care  -Discussed the importance of Risk Factor modification  -Cardiac rehab info provided/referral and communication to cardiac rehab completed  -F/U outpt in 1-2 weeks with Cardiologist  Dr. Hernandes

## 2023-04-06 NOTE — DISCHARGE NOTE NURSING/CASE MANAGEMENT/SOCIAL WORK - PATIENT PORTAL LINK FT
You can access the FollowMyHealth Patient Portal offered by [facility] by registering at the following website: [email or web address]. By joining [facility]’s FollowMyHealth portal, you will also be able to view your health information using other applications (apps) compatible with our system.

## 2023-04-07 RX ORDER — CLOPIDOGREL BISULFATE 75 MG/1
1 TABLET, FILM COATED ORAL
Qty: 90 | Refills: 3
Start: 2023-04-07 | End: 2024-03-31

## 2023-04-07 RX ORDER — ASPIRIN/CALCIUM CARB/MAGNESIUM 324 MG
1 TABLET ORAL
Qty: 90 | Refills: 3
Start: 2023-04-07 | End: 2024-03-31

## 2023-04-13 LAB
ANION GAP SERPL CALC-SCNC: 23 MMOL/L
BUN SERPL-MCNC: 27 MG/DL
CALCIUM SERPL-MCNC: 9.6 MG/DL
CHLORIDE SERPL-SCNC: 108 MMOL/L
CO2 SERPL-SCNC: 18 MMOL/L
CREAT SERPL-MCNC: 1.11 MG/DL
EGFR: 73 ML/MIN/1.73M2
GLUCOSE SERPL-MCNC: 113 MG/DL
NT-PROBNP SERPL-MCNC: 224 PG/ML
POTASSIUM SERPL-SCNC: 4.8 MMOL/L
SODIUM SERPL-SCNC: 150 MMOL/L

## 2023-04-17 ENCOUNTER — RX RENEWAL (OUTPATIENT)
Age: 68
End: 2023-04-17

## 2023-04-19 ENCOUNTER — APPOINTMENT (OUTPATIENT)
Dept: CARDIOLOGY | Facility: CLINIC | Age: 68
End: 2023-04-19

## 2023-04-19 ENCOUNTER — OFFICE (OUTPATIENT)
Dept: URBAN - METROPOLITAN AREA CLINIC 113 | Facility: CLINIC | Age: 68
Setting detail: OPHTHALMOLOGY
End: 2023-04-19
Payer: MEDICARE

## 2023-04-19 DIAGNOSIS — H40.1132: ICD-10-CM

## 2023-04-19 PROCEDURE — 99024 POSTOP FOLLOW-UP VISIT: CPT | Performed by: STUDENT IN AN ORGANIZED HEALTH CARE EDUCATION/TRAINING PROGRAM

## 2023-04-19 ASSESSMENT — REFRACTION_MANIFEST
OD_SPHERE: +0.25
OD_AXIS: 0
OD_VA1: 20/25
OS_AXIS: 079
OS_VA1: 20/20
OD_VA1: 20/20
OD_CYLINDER: -0.25
OD_CYLINDER: SPHERE
OS_CYLINDER: -1.00
OS_AXIS: 61
OS_SPHERE: +0.25
OD_SPHERE: -3.25
OD_AXIS: 003
OS_CYLINDER: +0.25
OS_VA1: 20/20-
OS_SPHERE: -1.25

## 2023-04-19 ASSESSMENT — SPHEQUIV_DERIVED
OD_SPHEQUIV: 0
OD_SPHEQUIV: -3.375
OS_SPHEQUIV: 0
OS_SPHEQUIV: -1.75
OS_SPHEQUIV: 0.375

## 2023-04-19 ASSESSMENT — REFRACTION_CURRENTRX
OD_VPRISM_DIRECTION: SV
OS_CYLINDER: +0.75
OD_SPHERE: -2.50
OS_OVR_VA: 20/
OS_VPRISM_DIRECTION: SV
OD_CYLINDER: -0.50
OD_OVR_VA: 20/
OS_SPHERE: -1.25
OD_AXIS: 170
OS_AXIS: 079

## 2023-04-19 ASSESSMENT — PACHYMETRY
OD_CT_UM: 517
OD_CT_CORRECTION: 2
OS_CT_UM: 498
OS_CT_CORRECTION: 4

## 2023-04-19 ASSESSMENT — TONOMETRY
OD_IOP_MMHG: 11
OS_IOP_MMHG: 10

## 2023-04-19 ASSESSMENT — REFRACTION_AUTOREFRACTION
OS_AXIS: 079
OD_CYLINDER: -0.50
OD_SPHERE: +0.25
OS_SPHERE: +0.50
OD_AXIS: 150
OS_CYLINDER: -1.00

## 2023-04-19 ASSESSMENT — SUPERFICIAL PUNCTATE KERATITIS (SPK): OD_SPK: 1+

## 2023-04-19 ASSESSMENT — AXIALLENGTH_DERIVED
OS_AL: 24.9516
OS_AL: 25.7383
OS_AL: 24.7892
OD_AL: 26.5146
OD_AL: 24.9516

## 2023-04-19 ASSESSMENT — KERATOMETRY
OS_AXISANGLE_DEGREES: 151
OD_K2POWER_DIOPTERS: 40.50
OD_K1POWER_DIOPTERS: 39.50
OS_K2POWER_DIOPTERS: 40.25
OS_K1POWER_DIOPTERS: 39.75
OD_AXISANGLE_DEGREES: 075

## 2023-04-19 ASSESSMENT — VISUAL ACUITY
OS_BCVA: 20/20-1
OD_BCVA: 20/20

## 2023-05-10 ENCOUNTER — NON-APPOINTMENT (OUTPATIENT)
Age: 68
End: 2023-05-10

## 2023-05-10 ENCOUNTER — APPOINTMENT (OUTPATIENT)
Dept: CARDIOLOGY | Facility: CLINIC | Age: 68
End: 2023-05-10
Payer: MEDICARE

## 2023-05-10 VITALS
DIASTOLIC BLOOD PRESSURE: 78 MMHG | SYSTOLIC BLOOD PRESSURE: 112 MMHG | OXYGEN SATURATION: 95 % | WEIGHT: 232 LBS | HEART RATE: 68 BPM | BODY MASS INDEX: 28.25 KG/M2 | TEMPERATURE: 98.6 F | HEIGHT: 76 IN

## 2023-05-10 DIAGNOSIS — R73.09 OTHER ABNORMAL GLUCOSE: ICD-10-CM

## 2023-05-10 DIAGNOSIS — E78.00 PURE HYPERCHOLESTEROLEMIA, UNSPECIFIED: ICD-10-CM

## 2023-05-10 DIAGNOSIS — Z09 ENCOUNTER FOR FOLLOW-UP EXAMINATION AFTER COMPLETED TREATMENT FOR CONDITIONS OTHER THAN MALIGNANT NEOPLASM: ICD-10-CM

## 2023-05-10 PROCEDURE — 93000 ELECTROCARDIOGRAM COMPLETE: CPT

## 2023-05-10 PROCEDURE — 99215 OFFICE O/P EST HI 40 MIN: CPT | Mod: 25

## 2023-05-10 RX ORDER — CARVEDILOL 3.12 MG/1
3.12 TABLET, FILM COATED ORAL TWICE DAILY
Qty: 180 | Refills: 3 | Status: DISCONTINUED | COMMUNITY
Start: 2023-03-27 | End: 2023-05-10

## 2023-05-10 RX ORDER — SPIRONOLACTONE 25 MG/1
25 TABLET ORAL
Qty: 90 | Refills: 2 | Status: DISCONTINUED | COMMUNITY
Start: 2023-03-27 | End: 2023-05-10

## 2023-05-10 RX ORDER — EMPAGLIFLOZIN 10 MG/1
10 TABLET, FILM COATED ORAL
Qty: 90 | Refills: 2 | Status: DISCONTINUED | COMMUNITY
Start: 2023-03-27 | End: 2023-05-10

## 2023-05-14 NOTE — H&P PST ADULT - VENOUS THROMBOEMBOLISM SCORE
72 y/o Axox4 F arrived from home complaining of chest discomfort and 'bad gas pains' worsening since Friday. PMH inc liver enzymes. Pt endorses inc. of belching, abd distention, gas pains, and inc. jaundice. Pt stated 10 lb weight loss also in the last 1-2months. Pt on CM NSR. Pt ambulates independently. 4

## 2023-06-14 ENCOUNTER — APPOINTMENT (OUTPATIENT)
Dept: CARDIOLOGY | Facility: CLINIC | Age: 68
End: 2023-06-14

## 2023-06-23 ENCOUNTER — APPOINTMENT (OUTPATIENT)
Dept: PULMONOLOGY | Facility: CLINIC | Age: 68
End: 2023-06-23
Payer: MEDICARE

## 2023-06-23 VITALS — HEIGHT: 76 IN | BODY MASS INDEX: 28.01 KG/M2 | WEIGHT: 230 LBS

## 2023-06-23 PROCEDURE — 94010 BREATHING CAPACITY TEST: CPT

## 2023-06-30 ENCOUNTER — RX RENEWAL (OUTPATIENT)
Age: 68
End: 2023-06-30

## 2023-06-30 RX ORDER — VALSARTAN 80 MG/1
80 TABLET, COATED ORAL DAILY
Qty: 90 | Refills: 1 | Status: DISCONTINUED | COMMUNITY
Start: 2023-03-27 | End: 2023-05-10

## 2023-07-05 ENCOUNTER — RX RENEWAL (OUTPATIENT)
Age: 68
End: 2023-07-05

## 2023-07-25 ENCOUNTER — OFFICE (OUTPATIENT)
Dept: URBAN - METROPOLITAN AREA CLINIC 113 | Facility: CLINIC | Age: 68
Setting detail: OPHTHALMOLOGY
End: 2023-07-25
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
## 2023-07-25 PROCEDURE — NO SHOW FE NO SHOW FEE: Performed by: STUDENT IN AN ORGANIZED HEALTH CARE EDUCATION/TRAINING PROGRAM

## 2023-09-02 ENCOUNTER — OFFICE (OUTPATIENT)
Dept: URBAN - METROPOLITAN AREA CLINIC 1 | Facility: CLINIC | Age: 68
Setting detail: OPHTHALMOLOGY
End: 2023-09-02
Payer: MEDICARE

## 2023-09-02 DIAGNOSIS — H40.1132: ICD-10-CM

## 2023-09-02 DIAGNOSIS — B30.9: ICD-10-CM

## 2023-09-02 PROCEDURE — 99213 OFFICE O/P EST LOW 20 MIN: CPT

## 2023-09-02 ASSESSMENT — PACHYMETRY
OS_CT_CORRECTION: 4
OD_CT_CORRECTION: 2
OD_CT_UM: 517
OS_CT_UM: 498

## 2023-09-02 ASSESSMENT — VISUAL ACUITY
OS_BCVA: 20/20
OD_BCVA: 20/20-2

## 2023-09-02 ASSESSMENT — REFRACTION_MANIFEST
OD_AXIS: 0
OD_SPHERE: +0.25
OS_SPHERE: -1.25
OS_CYLINDER: -1.00
OS_AXIS: 61
OD_VA1: 20/25
OD_SPHERE: -3.25
OS_SPHERE: +0.25
OD_CYLINDER: -0.25
OS_AXIS: 079
OS_VA1: 20/20-
OD_AXIS: 003
OD_CYLINDER: SPHERE
OD_VA1: 20/20
OS_VA1: 20/20
OS_CYLINDER: +0.25

## 2023-09-02 ASSESSMENT — REFRACTION_AUTOREFRACTION
OS_SPHERE: +0.50
OS_CYLINDER: -0.75
OD_SPHERE: +0.25
OS_AXIS: 084
OD_CYLINDER: -0.75
OD_AXIS: 159

## 2023-09-02 ASSESSMENT — TONOMETRY: OS_IOP_MMHG: 11

## 2023-09-02 ASSESSMENT — SUPERFICIAL PUNCTATE KERATITIS (SPK): OD_SPK: ABSENT

## 2023-09-02 ASSESSMENT — REFRACTION_CURRENTRX
OS_AXIS: 079
OD_CYLINDER: -0.50
OD_VPRISM_DIRECTION: SV
OD_OVR_VA: 20/
OS_VPRISM_DIRECTION: SV
OS_SPHERE: -1.25
OD_SPHERE: -2.50
OS_OVR_VA: 20/
OS_CYLINDER: +0.75
OD_AXIS: 170

## 2023-09-02 ASSESSMENT — KERATOMETRY
OS_K1POWER_DIOPTERS: 39.50
OS_AXISANGLE_DEGREES: 175
OD_K2POWER_DIOPTERS: 40.50
OD_K1POWER_DIOPTERS: 39.75
OS_K2POWER_DIOPTERS: 40.00
OD_AXISANGLE_DEGREES: 074

## 2023-09-02 ASSESSMENT — SPHEQUIV_DERIVED
OD_SPHEQUIV: -3.375
OS_SPHEQUIV: 0.375
OS_SPHEQUIV: 0.125
OD_SPHEQUIV: -0.125
OS_SPHEQUIV: -1.75

## 2023-09-02 ASSESSMENT — AXIALLENGTH_DERIVED
OD_AL: 26.4567
OD_AL: 24.9547
OS_AL: 25.848
OS_AL: 24.891
OS_AL: 24.9999

## 2023-09-02 ASSESSMENT — CONFRONTATIONAL VISUAL FIELD TEST (CVF)
OD_FINDINGS: FULL
OS_FINDINGS: FULL

## 2023-09-13 ENCOUNTER — APPOINTMENT (OUTPATIENT)
Dept: CARDIOLOGY | Facility: CLINIC | Age: 68
End: 2023-09-13

## 2023-10-19 ENCOUNTER — NON-APPOINTMENT (OUTPATIENT)
Age: 68
End: 2023-10-19

## 2023-10-19 ENCOUNTER — APPOINTMENT (OUTPATIENT)
Dept: CARDIOLOGY | Facility: CLINIC | Age: 68
End: 2023-10-19
Payer: MEDICARE

## 2023-10-19 VITALS
TEMPERATURE: 97.9 F | HEART RATE: 76 BPM | DIASTOLIC BLOOD PRESSURE: 66 MMHG | WEIGHT: 234 LBS | HEIGHT: 76 IN | SYSTOLIC BLOOD PRESSURE: 130 MMHG | BODY MASS INDEX: 28.49 KG/M2 | OXYGEN SATURATION: 97 %

## 2023-10-19 DIAGNOSIS — I10 ESSENTIAL (PRIMARY) HYPERTENSION: ICD-10-CM

## 2023-10-19 PROCEDURE — 99214 OFFICE O/P EST MOD 30 MIN: CPT | Mod: 25

## 2023-10-19 PROCEDURE — 93000 ELECTROCARDIOGRAM COMPLETE: CPT

## 2023-10-26 ENCOUNTER — RX RENEWAL (OUTPATIENT)
Age: 68
End: 2023-10-26

## 2023-10-31 ENCOUNTER — OFFICE (OUTPATIENT)
Dept: URBAN - METROPOLITAN AREA CLINIC 113 | Facility: CLINIC | Age: 68
Setting detail: OPHTHALMOLOGY
End: 2023-10-31
Payer: MEDICARE

## 2023-10-31 DIAGNOSIS — H16.221: ICD-10-CM

## 2023-10-31 DIAGNOSIS — H40.1132: ICD-10-CM

## 2023-10-31 DIAGNOSIS — H43.393: ICD-10-CM

## 2023-10-31 PROCEDURE — 92014 COMPRE OPH EXAM EST PT 1/>: CPT | Performed by: STUDENT IN AN ORGANIZED HEALTH CARE EDUCATION/TRAINING PROGRAM

## 2023-10-31 ASSESSMENT — REFRACTION_CURRENTRX
OD_VPRISM_DIRECTION: SV
OS_VPRISM_DIRECTION: SV
OD_AXIS: 170
OS_AXIS: 079
OD_CYLINDER: -0.50
OD_SPHERE: -2.50
OS_OVR_VA: 20/
OS_CYLINDER: +0.75
OD_OVR_VA: 20/
OS_SPHERE: -1.25

## 2023-10-31 ASSESSMENT — REFRACTION_MANIFEST
OS_SPHERE: +0.25
OS_CYLINDER: +0.25
OS_CYLINDER: -1.00
OD_AXIS: 0
OD_SPHERE: +0.25
OD_SPHERE: -3.25
OS_VA1: 20/20-
OS_VA1: 20/20
OS_AXIS: 61
OS_SPHERE: -1.25
OD_VA1: 20/25
OD_CYLINDER: SPHERE
OS_AXIS: 079
OD_AXIS: 003
OD_CYLINDER: -0.25
OD_VA1: 20/20

## 2023-10-31 ASSESSMENT — VISUAL ACUITY
OS_BCVA: 20/20-1
OD_BCVA: 20/20

## 2023-10-31 ASSESSMENT — PACHYMETRY
OS_CT_UM: 498
OD_CT_UM: 517
OS_CT_CORRECTION: 4
OD_CT_CORRECTION: 2

## 2023-10-31 ASSESSMENT — TONOMETRY
OS_IOP_MMHG: 11
OD_IOP_MMHG: 10

## 2023-10-31 ASSESSMENT — SPHEQUIV_DERIVED
OD_SPHEQUIV: 0
OD_SPHEQUIV: -3.375
OS_SPHEQUIV: 0.375
OS_SPHEQUIV: -1.75
OS_SPHEQUIV: 0.125

## 2023-10-31 ASSESSMENT — CONFRONTATIONAL VISUAL FIELD TEST (CVF)
OD_FINDINGS: FULL
OS_FINDINGS: FULL

## 2023-10-31 ASSESSMENT — REFRACTION_AUTOREFRACTION
OD_SPHERE: +0.25
OD_CYLINDER: -0.50
OS_AXIS: 090
OS_CYLINDER: -0.75
OS_SPHERE: +0.50
OD_AXIS: 147

## 2023-10-31 ASSESSMENT — SUPERFICIAL PUNCTATE KERATITIS (SPK): OD_SPK: ABSENT

## 2023-12-01 ENCOUNTER — APPOINTMENT (OUTPATIENT)
Dept: CARDIOLOGY | Facility: CLINIC | Age: 68
End: 2023-12-01

## 2023-12-25 ENCOUNTER — RX RENEWAL (OUTPATIENT)
Age: 68
End: 2023-12-25

## 2024-01-02 ENCOUNTER — APPOINTMENT (OUTPATIENT)
Dept: CARDIOLOGY | Facility: CLINIC | Age: 69
End: 2024-01-02

## 2024-01-29 ENCOUNTER — RX RENEWAL (OUTPATIENT)
Age: 69
End: 2024-01-29

## 2024-01-29 RX ORDER — CLOPIDOGREL BISULFATE 75 MG/1
75 TABLET, FILM COATED ORAL DAILY
Qty: 100 | Refills: 1 | Status: ACTIVE | COMMUNITY
Start: 2023-10-26 | End: 1900-01-01

## 2024-01-30 ENCOUNTER — RX RENEWAL (OUTPATIENT)
Age: 69
End: 2024-01-30

## 2024-02-05 ENCOUNTER — OFFICE (OUTPATIENT)
Dept: URBAN - METROPOLITAN AREA CLINIC 113 | Facility: CLINIC | Age: 69
Setting detail: OPHTHALMOLOGY
End: 2024-02-05
Payer: MEDICARE

## 2024-02-05 DIAGNOSIS — H40.1132: ICD-10-CM

## 2024-02-05 DIAGNOSIS — H16.221: ICD-10-CM

## 2024-02-05 PROCEDURE — 92133 CPTRZD OPH DX IMG PST SGM ON: CPT | Performed by: STUDENT IN AN ORGANIZED HEALTH CARE EDUCATION/TRAINING PROGRAM

## 2024-02-05 PROCEDURE — 92083 EXTENDED VISUAL FIELD XM: CPT | Performed by: STUDENT IN AN ORGANIZED HEALTH CARE EDUCATION/TRAINING PROGRAM

## 2024-02-05 PROCEDURE — 99213 OFFICE O/P EST LOW 20 MIN: CPT | Performed by: STUDENT IN AN ORGANIZED HEALTH CARE EDUCATION/TRAINING PROGRAM

## 2024-02-05 ASSESSMENT — CONFRONTATIONAL VISUAL FIELD TEST (CVF)
OS_FINDINGS: FULL
OD_FINDINGS: FULL

## 2024-02-05 ASSESSMENT — SUPERFICIAL PUNCTATE KERATITIS (SPK): OD_SPK: ABSENT

## 2024-02-07 ASSESSMENT — REFRACTION_MANIFEST
OS_VA1: 20/20-
OS_CYLINDER: -1.00
OD_AXIS: 003
OS_AXIS: 61
OS_AXIS: 079
OS_SPHERE: -1.25
OD_AXIS: 0
OD_SPHERE: +0.25
OD_VA1: 20/20
OS_SPHERE: +0.25
OD_CYLINDER: SPHERE
OD_VA1: 20/25
OS_CYLINDER: +0.25
OD_CYLINDER: -0.25
OS_VA1: 20/20
OD_SPHERE: -3.25

## 2024-02-07 ASSESSMENT — REFRACTION_CURRENTRX
OD_CYLINDER: -0.50
OS_SPHERE: -1.25
OD_AXIS: 170
OD_VPRISM_DIRECTION: SV
OS_VPRISM_DIRECTION: SV
OD_SPHERE: -2.50
OS_OVR_VA: 20/
OS_AXIS: 079
OS_CYLINDER: +0.75
OD_OVR_VA: 20/

## 2024-02-07 ASSESSMENT — REFRACTION_AUTOREFRACTION
OD_CYLINDER: -1.00
OD_AXIS: 146
OS_CYLINDER: -1.00
OS_SPHERE: +0.50
OD_SPHERE: +0.50
OS_AXIS: 084

## 2024-02-07 ASSESSMENT — SPHEQUIV_DERIVED
OS_SPHEQUIV: -1.75
OS_SPHEQUIV: 0
OD_SPHEQUIV: 0
OD_SPHEQUIV: -3.375
OS_SPHEQUIV: 0.375

## 2024-03-04 ENCOUNTER — NON-APPOINTMENT (OUTPATIENT)
Age: 69
End: 2024-03-04

## 2024-03-04 ENCOUNTER — APPOINTMENT (OUTPATIENT)
Dept: CARDIOLOGY | Facility: CLINIC | Age: 69
End: 2024-03-04
Payer: MEDICARE

## 2024-03-04 VITALS
OXYGEN SATURATION: 96 % | SYSTOLIC BLOOD PRESSURE: 128 MMHG | DIASTOLIC BLOOD PRESSURE: 82 MMHG | HEART RATE: 73 BPM | BODY MASS INDEX: 28.01 KG/M2 | WEIGHT: 230 LBS | HEIGHT: 76 IN

## 2024-03-04 PROCEDURE — 93000 ELECTROCARDIOGRAM COMPLETE: CPT

## 2024-03-04 PROCEDURE — 99214 OFFICE O/P EST MOD 30 MIN: CPT | Mod: 25

## 2024-03-05 ENCOUNTER — APPOINTMENT (OUTPATIENT)
Dept: CARDIOLOGY | Facility: CLINIC | Age: 69
End: 2024-03-05
Payer: MEDICARE

## 2024-03-05 PROCEDURE — 93306 TTE W/DOPPLER COMPLETE: CPT

## 2024-03-10 ENCOUNTER — NON-APPOINTMENT (OUTPATIENT)
Age: 69
End: 2024-03-10

## 2024-03-10 RX ORDER — EMPAGLIFLOZIN 10 MG/1
10 TABLET, FILM COATED ORAL
Qty: 90 | Refills: 2 | Status: ACTIVE | COMMUNITY
Start: 2024-03-10 | End: 1900-01-01

## 2024-03-11 ENCOUNTER — NON-APPOINTMENT (OUTPATIENT)
Age: 69
End: 2024-03-11

## 2024-03-29 LAB
ANION GAP SERPL CALC-SCNC: 13 MMOL/L
BUN SERPL-MCNC: 20 MG/DL
CALCIUM SERPL-MCNC: 9.5 MG/DL
CHLORIDE SERPL-SCNC: 102 MMOL/L
CO2 SERPL-SCNC: 25 MMOL/L
CREAT SERPL-MCNC: 1 MG/DL
EGFR: 82 ML/MIN/1.73M2
GLUCOSE SERPL-MCNC: 125 MG/DL
NT-PROBNP SERPL-MCNC: 134 PG/ML
POTASSIUM SERPL-SCNC: 4 MMOL/L
SODIUM SERPL-SCNC: 141 MMOL/L

## 2024-04-22 ENCOUNTER — TRANSCRIPTION ENCOUNTER (OUTPATIENT)
Age: 69
End: 2024-04-22

## 2024-05-08 ENCOUNTER — APPOINTMENT (OUTPATIENT)
Dept: CARDIOLOGY | Facility: CLINIC | Age: 69
End: 2024-05-08
Payer: MEDICARE

## 2024-05-08 PROCEDURE — 93351 STRESS TTE COMPLETE: CPT

## 2024-05-08 PROCEDURE — 93320 DOPPLER ECHO COMPLETE: CPT

## 2024-05-14 ENCOUNTER — TRANSCRIPTION ENCOUNTER (OUTPATIENT)
Age: 69
End: 2024-05-14

## 2024-05-15 ENCOUNTER — NON-APPOINTMENT (OUTPATIENT)
Age: 69
End: 2024-05-15

## 2024-05-22 ENCOUNTER — APPOINTMENT (OUTPATIENT)
Dept: CARDIOLOGY | Facility: CLINIC | Age: 69
End: 2024-05-22
Payer: MEDICARE

## 2024-05-22 VITALS
TEMPERATURE: 98 F | DIASTOLIC BLOOD PRESSURE: 67 MMHG | OXYGEN SATURATION: 98 % | SYSTOLIC BLOOD PRESSURE: 106 MMHG | HEIGHT: 76 IN | BODY MASS INDEX: 27.28 KG/M2 | HEART RATE: 72 BPM | WEIGHT: 224 LBS

## 2024-05-22 DIAGNOSIS — R06.09 OTHER FORMS OF DYSPNEA: ICD-10-CM

## 2024-05-22 DIAGNOSIS — R07.89 OTHER CHEST PAIN: ICD-10-CM

## 2024-05-22 DIAGNOSIS — I35.0 NONRHEUMATIC AORTIC (VALVE) STENOSIS: ICD-10-CM

## 2024-05-22 DIAGNOSIS — R01.1 CARDIAC MURMUR, UNSPECIFIED: ICD-10-CM

## 2024-05-22 DIAGNOSIS — Z00.00 ENCOUNTER FOR GENERAL ADULT MEDICAL EXAMINATION W/OUT ABNORMAL FINDINGS: ICD-10-CM

## 2024-05-22 DIAGNOSIS — Z98.61 ATHEROSCLEROTIC HEART DISEASE OF NATIVE CORONARY ARTERY W/OUT ANGINA PECTORIS: ICD-10-CM

## 2024-05-22 DIAGNOSIS — I42.9 CARDIOMYOPATHY, UNSPECIFIED: ICD-10-CM

## 2024-05-22 DIAGNOSIS — I25.10 ATHEROSCLEROTIC HEART DISEASE OF NATIVE CORONARY ARTERY W/OUT ANGINA PECTORIS: ICD-10-CM

## 2024-05-22 DIAGNOSIS — R42 DIZZINESS AND GIDDINESS: ICD-10-CM

## 2024-05-22 DIAGNOSIS — I65.29 OCCLUSION AND STENOSIS OF UNSPECIFIED CAROTID ARTERY: ICD-10-CM

## 2024-05-22 DIAGNOSIS — I44.7 LEFT BUNDLE-BRANCH BLOCK, UNSPECIFIED: ICD-10-CM

## 2024-05-22 PROCEDURE — 93000 ELECTROCARDIOGRAM COMPLETE: CPT

## 2024-05-22 PROCEDURE — 99215 OFFICE O/P EST HI 40 MIN: CPT

## 2024-05-22 PROCEDURE — G2211 COMPLEX E/M VISIT ADD ON: CPT

## 2024-05-22 NOTE — CARDIOLOGY SUMMARY
[No Ischemia] : no Ischemia [No Symptoms] : no Symptoms [LVEF ___%] : LVEF [unfilled]% [___] : [unfilled] [None] : no pulmonary hypertension [Normal] : normal LA size [Mild] : mild mitral regurgitation [___] : [unfilled] [de-identified] : 1 17 2023 Sinus  Rhythm \par  -Left bundle branch block. \par  \par  ABNORMAL \par  \par  \par  7 27 2022 Sinus  Rhythm \par  -Left bundle branch block. \par  \par  ABNORMAL \par  \par   [de-identified] : 5 22 2024:   9 Mins exercisue duration on brusice protocol. 12 METs.  aortic velocityincrase to 3.9 .  Severe aortic stenosis.    2/2021: No ischemia. Nuclear dann stress echo.  [de-identified] : feb 2021:  Normal LVef 55-60%.  aortic calcification. mild aortic  stenosis.  [de-identified] : apr 2023: PCI to ramus.    [de-identified] : Carotid DUplex: moderate atherosclerosis. No stenosis.  [de-identified] : aug 2023:  LDL : 64; HDL : 38; Total 121   lipoprotien a 119   [de-identified] : carotid Dupelx: 2017: moderate ahtersclerosis. \par  \par  Aug 2018: AAA screening: No aneurysm. mild plaque.

## 2024-05-22 NOTE — HISTORY OF PRESENT ILLNESS
[FreeTextEntry1] : F/u:  HTN, Non obstructive CAD, HLD, smoking addition,    HPI for today: : 5 22 2024:  No chest pain . no dyspnea on exertion . no dizizness. no syncope.  complatn with meds.  some dizizness when he gets up too quickly did a stress echo.  good exercie apacity. nosignificant symptoms.    olld note:    no chest pain .  no dyspnea on exertion . no leg edema.  complaitn with med.   no diziznes. no palptiations. still smoking.  smokes 15 cig /days   old note:  he still smoking. half what he did. trying to cut back.  he has been p[hysicallly active.  no chest pain .    old note: he is still smoking. 1 pack a day a he is eating healthy. some dizziness when he gets up quickly. cramps in the legs.  old noteL:  :  fels good. complaitn with meds.  he smokes 5-6 a day. he tries to eat healthy food.  he is phsycially activie chronic condictions: hypertension : controlled.  coronary artery disease : stable.   old note: : feels good. Still smokes 1 pack a day,   he went back to 1 pack a day No chest pain. no headaches. No dizziness. no dyspnea.    old note: cut down to 4-5 /day. Changing his habits related to smoking,. No chest pain. + cough. Bronchnitis symptoms. complains of dizziness.     old note: smoking is less. but still smokes.  its 10-13 day.  No chest pain. + dizziness when he stands up quickly.  Some dyspnea on exertion. but similar exercsie capacity.   old note: Recent right shoulder surery done.  tolerated surgery well. no cardiac complication.   OLD note: This is a 59 year old male with no significant past medical history, fisherman by profession, was found to have elevated blood pressure and recent diagnosis of hypertension 4/10/15. patient states his BP has been always in normal range throughout his life.

## 2024-05-22 NOTE — DISCUSSION/SUMMARY
[Patient] : the patient [Risks] : risks [Benefits] : benefits [With Me] : with me [___ Month(s)] : in [unfilled] month(s) [FreeTextEntry1] : This is a 68 M with h/o dyslipidemia, smoking, recent diagnosis of hypertension, complains of dyspnea on exertion. 1)  orthostaic dizziness: lowBp meds.  HTN with LVH and grade 1 DD.: Likely essential. controlled Continue diet and exercise. reduce  valsartan- HCTZ  to 80-12.5     to half tablet.   2) CAD: s/p PCi in ramus:     s/p PCI in apr 2023:  and lipitor 20 mg .  D./c aspirin ct clopidodgresl.  3) moderate atherosclerosis of carotid:  d/c aspirin   and  statins.   on clopidogrel next visit do carotid duplex.  4) AAA screening:   US aorta: mild diffuse heterogenous plaque. no dilation,.  5) Smoking cessation:  still smoking. trying to quit  6)  moderate to severe aortic stenosis: asymtopatmic.  good exercise capacity. 6 repeat echo in 6 mths.  Will order and review ECG for the above mentioned diagnosis/condition/symptoms  patient will ask his PCP to get blodo work done for his choslterol and will send us the result.s   [EKG obtained to assist in diagnosis and management of assessed problem(s)] : EKG obtained to assist in diagnosis and management of assessed problem(s)

## 2024-06-28 NOTE — H&P PST ADULT - CIGARETTE, PACK YRS
[age]-year-old male with HTN, HLD, COPD, atrial fibrillation, BPH, history of R. lung Ca s/p RLL wedge resection, EtOH use disorder who presented from PCP for dyspnea, admitted to MICU for acute hypoxic respiratory failure and severe sepsis iso strep pneumo PNA c/b repeat episode of afib with rvr now controlled on lopressor, and RP hematoma sp exploratory and IVC filter placement. Transitioned from HFNC to NC. Patient with improving respiratory function and function strength as noted with PT.

## 2024-07-06 ENCOUNTER — RX RENEWAL (OUTPATIENT)
Age: 69
End: 2024-07-06

## 2024-07-09 ENCOUNTER — NON-APPOINTMENT (OUTPATIENT)
Age: 69
End: 2024-07-09

## 2024-07-17 ENCOUNTER — OFFICE (OUTPATIENT)
Dept: URBAN - METROPOLITAN AREA CLINIC 113 | Facility: CLINIC | Age: 69
Setting detail: OPHTHALMOLOGY
End: 2024-07-17
Payer: MEDICARE

## 2024-07-17 DIAGNOSIS — H16.221: ICD-10-CM

## 2024-07-17 DIAGNOSIS — H40.1132: ICD-10-CM

## 2024-07-17 PROCEDURE — 99213 OFFICE O/P EST LOW 20 MIN: CPT | Performed by: STUDENT IN AN ORGANIZED HEALTH CARE EDUCATION/TRAINING PROGRAM

## 2024-09-10 ENCOUNTER — TRANSCRIPTION ENCOUNTER (OUTPATIENT)
Age: 69
End: 2024-09-10

## 2024-09-17 ENCOUNTER — RX RENEWAL (OUTPATIENT)
Age: 69
End: 2024-09-17

## 2024-11-20 ENCOUNTER — APPOINTMENT (OUTPATIENT)
Dept: CARDIOLOGY | Facility: CLINIC | Age: 69
End: 2024-11-20

## 2024-12-05 ENCOUNTER — NON-APPOINTMENT (OUTPATIENT)
Age: 69
End: 2024-12-05

## 2024-12-05 ENCOUNTER — APPOINTMENT (OUTPATIENT)
Dept: CARDIOLOGY | Facility: CLINIC | Age: 69
End: 2024-12-05
Payer: MEDICARE

## 2024-12-05 VITALS
OXYGEN SATURATION: 97 % | BODY MASS INDEX: 26.55 KG/M2 | SYSTOLIC BLOOD PRESSURE: 126 MMHG | WEIGHT: 218 LBS | HEART RATE: 63 BPM | HEIGHT: 76 IN | DIASTOLIC BLOOD PRESSURE: 82 MMHG

## 2024-12-05 DIAGNOSIS — Z98.61 ATHEROSCLEROTIC HEART DISEASE OF NATIVE CORONARY ARTERY W/OUT ANGINA PECTORIS: ICD-10-CM

## 2024-12-05 DIAGNOSIS — I35.0 NONRHEUMATIC AORTIC (VALVE) STENOSIS: ICD-10-CM

## 2024-12-05 DIAGNOSIS — I10 ESSENTIAL (PRIMARY) HYPERTENSION: ICD-10-CM

## 2024-12-05 DIAGNOSIS — I44.7 LEFT BUNDLE-BRANCH BLOCK, UNSPECIFIED: ICD-10-CM

## 2024-12-05 DIAGNOSIS — I25.10 ATHEROSCLEROTIC HEART DISEASE OF NATIVE CORONARY ARTERY W/OUT ANGINA PECTORIS: ICD-10-CM

## 2024-12-05 DIAGNOSIS — I65.29 OCCLUSION AND STENOSIS OF UNSPECIFIED CAROTID ARTERY: ICD-10-CM

## 2024-12-05 DIAGNOSIS — I42.9 CARDIOMYOPATHY, UNSPECIFIED: ICD-10-CM

## 2024-12-05 DIAGNOSIS — R42 DIZZINESS AND GIDDINESS: ICD-10-CM

## 2024-12-05 PROCEDURE — 99214 OFFICE O/P EST MOD 30 MIN: CPT | Mod: 25

## 2024-12-05 PROCEDURE — 93246 EXT ECG>7D<15D RECORDING: CPT

## 2024-12-05 PROCEDURE — 93000 ELECTROCARDIOGRAM COMPLETE: CPT | Mod: 59

## 2024-12-06 ENCOUNTER — APPOINTMENT (OUTPATIENT)
Dept: CARDIOLOGY | Facility: CLINIC | Age: 69
End: 2024-12-06
Payer: MEDICARE

## 2024-12-06 PROCEDURE — 93306 TTE W/DOPPLER COMPLETE: CPT

## 2024-12-10 ENCOUNTER — NON-APPOINTMENT (OUTPATIENT)
Age: 69
End: 2024-12-10

## 2024-12-13 ENCOUNTER — APPOINTMENT (OUTPATIENT)
Dept: CARDIOLOGY | Facility: CLINIC | Age: 69
End: 2024-12-13
Payer: MEDICARE

## 2024-12-13 PROCEDURE — 93880 EXTRACRANIAL BILAT STUDY: CPT

## 2024-12-17 ENCOUNTER — NON-APPOINTMENT (OUTPATIENT)
Age: 69
End: 2024-12-17

## 2025-02-05 ENCOUNTER — NON-APPOINTMENT (OUTPATIENT)
Age: 70
End: 2025-02-05

## 2025-02-28 ENCOUNTER — RX RENEWAL (OUTPATIENT)
Age: 70
End: 2025-02-28

## 2025-03-12 ENCOUNTER — OFFICE (OUTPATIENT)
Dept: URBAN - METROPOLITAN AREA CLINIC 113 | Facility: CLINIC | Age: 70
Setting detail: OPHTHALMOLOGY
End: 2025-03-12
Payer: MEDICARE

## 2025-03-12 DIAGNOSIS — H16.221: ICD-10-CM

## 2025-03-12 DIAGNOSIS — H40.1132: ICD-10-CM

## 2025-03-12 PROCEDURE — 99213 OFFICE O/P EST LOW 20 MIN: CPT | Performed by: STUDENT IN AN ORGANIZED HEALTH CARE EDUCATION/TRAINING PROGRAM

## 2025-03-12 ASSESSMENT — PACHYMETRY
OD_CT_CORRECTION: 2
OD_CT_UM: 517
OS_CT_CORRECTION: 4
OS_CT_UM: 498

## 2025-03-12 ASSESSMENT — REFRACTION_MANIFEST
OS_AXIS: 079
OD_VA1: 20/25
OD_AXIS: 0
OS_VA1: 20/20
OD_CYLINDER: SPHERE
OS_SPHERE: -1.25
OS_VA1: 20/20-
OD_CYLINDER: -0.25
OS_CYLINDER: +0.25
OS_AXIS: 61
OD_SPHERE: -3.25
OS_CYLINDER: -1.00
OD_SPHERE: +0.25
OD_VA1: 20/20
OD_AXIS: 003
OS_SPHERE: +0.25

## 2025-03-12 ASSESSMENT — REFRACTION_CURRENTRX
OS_VPRISM_DIRECTION: SV
OS_OVR_VA: 20/
OS_AXIS: 079
OD_SPHERE: -2.50
OD_OVR_VA: 20/
OD_AXIS: 170
OS_SPHERE: -1.25
OD_VPRISM_DIRECTION: SV
OD_CYLINDER: -0.50
OS_CYLINDER: +0.75

## 2025-03-12 ASSESSMENT — REFRACTION_AUTOREFRACTION
OD_AXIS: 161
OD_CYLINDER: -0.75
OS_CYLINDER: -0.75
OS_SPHERE: +0.50
OD_SPHERE: +0.25
OS_AXIS: 081

## 2025-03-12 ASSESSMENT — CONFRONTATIONAL VISUAL FIELD TEST (CVF)
OD_FINDINGS: FULL
OS_FINDINGS: FULL

## 2025-03-12 ASSESSMENT — KERATOMETRY
OS_AXISANGLE_DEGREES: 133
OS_K1POWER_DIOPTERS: 39.75
OS_K2POWER_DIOPTERS: 40.00
OD_AXISANGLE_DEGREES: 076
OD_K1POWER_DIOPTERS: 39.50
OD_K2POWER_DIOPTERS: 40.50

## 2025-03-12 ASSESSMENT — SUPERFICIAL PUNCTATE KERATITIS (SPK): OD_SPK: ABSENT

## 2025-03-12 ASSESSMENT — VISUAL ACUITY
OS_BCVA: 20/20
OD_BCVA: 20/20

## 2025-03-12 ASSESSMENT — TONOMETRY: OS_IOP_MMHG: 11

## 2025-04-15 ENCOUNTER — OFFICE (OUTPATIENT)
Dept: URBAN - METROPOLITAN AREA CLINIC 113 | Facility: CLINIC | Age: 70
Setting detail: OPHTHALMOLOGY
End: 2025-04-15
Payer: MEDICARE

## 2025-04-15 DIAGNOSIS — H02.403: ICD-10-CM

## 2025-04-15 DIAGNOSIS — H02.105: ICD-10-CM

## 2025-04-15 DIAGNOSIS — H02.102: ICD-10-CM

## 2025-04-15 PROCEDURE — 99214 OFFICE O/P EST MOD 30 MIN: CPT | Performed by: STUDENT IN AN ORGANIZED HEALTH CARE EDUCATION/TRAINING PROGRAM

## 2025-04-15 PROCEDURE — 92285 EXTERNAL OCULAR PHOTOGRAPHY: CPT | Performed by: STUDENT IN AN ORGANIZED HEALTH CARE EDUCATION/TRAINING PROGRAM

## 2025-04-15 ASSESSMENT — KERATOMETRY
OS_AXISANGLE_DEGREES: 133
OD_K2POWER_DIOPTERS: 40.50
OD_AXISANGLE_DEGREES: 076
OS_K2POWER_DIOPTERS: 40.00
OS_K1POWER_DIOPTERS: 39.75
OD_K1POWER_DIOPTERS: 39.50

## 2025-04-15 ASSESSMENT — SUPERFICIAL PUNCTATE KERATITIS (SPK): OD_SPK: ABSENT

## 2025-04-15 ASSESSMENT — VISUAL ACUITY
OS_BCVA: 20/20-
OD_BCVA: 20/20

## 2025-04-15 ASSESSMENT — REFRACTION_AUTOREFRACTION
OS_SPHERE: +0.50
OD_CYLINDER: -0.75
OD_SPHERE: +0.25
OD_AXIS: 161
OS_CYLINDER: -0.75
OS_AXIS: 081

## 2025-04-15 ASSESSMENT — CONFRONTATIONAL VISUAL FIELD TEST (CVF)
OS_FINDINGS: FULL
OD_FINDINGS: FULL

## 2025-05-14 ENCOUNTER — NON-APPOINTMENT (OUTPATIENT)
Age: 70
End: 2025-05-14

## 2025-05-14 DIAGNOSIS — R42 DIZZINESS AND GIDDINESS: ICD-10-CM

## 2025-05-21 PROCEDURE — 93270 REMOTE 30 DAY ECG REV/REPORT: CPT

## 2025-05-30 ENCOUNTER — APPOINTMENT (OUTPATIENT)
Dept: CT IMAGING | Facility: CLINIC | Age: 70
End: 2025-05-30
Payer: MEDICARE

## 2025-05-30 ENCOUNTER — OUTPATIENT (OUTPATIENT)
Dept: OUTPATIENT SERVICES | Facility: HOSPITAL | Age: 70
LOS: 1 days | End: 2025-05-30
Payer: MEDICARE

## 2025-05-30 DIAGNOSIS — R42 DIZZINESS AND GIDDINESS: ICD-10-CM

## 2025-05-30 DIAGNOSIS — Z98.890 OTHER SPECIFIED POSTPROCEDURAL STATES: Chronic | ICD-10-CM

## 2025-05-30 DIAGNOSIS — Z00.8 ENCOUNTER FOR OTHER GENERAL EXAMINATION: ICD-10-CM

## 2025-05-30 PROCEDURE — 70450 CT HEAD/BRAIN W/O DYE: CPT

## 2025-05-30 PROCEDURE — 70450 CT HEAD/BRAIN W/O DYE: CPT | Mod: 26

## 2025-06-04 DIAGNOSIS — I35.0 NONRHEUMATIC AORTIC (VALVE) STENOSIS: ICD-10-CM

## 2025-06-04 DIAGNOSIS — I47.29 OTHER VENTRICULAR TACHYCARDIA: ICD-10-CM

## 2025-06-04 DIAGNOSIS — I25.10 ATHEROSCLEROTIC HEART DISEASE OF NATIVE CORONARY ARTERY W/OUT ANGINA PECTORIS: ICD-10-CM

## 2025-06-04 DIAGNOSIS — Z98.61 ATHEROSCLEROTIC HEART DISEASE OF NATIVE CORONARY ARTERY W/OUT ANGINA PECTORIS: ICD-10-CM

## 2025-06-04 RX ORDER — METOPROLOL SUCCINATE 25 MG/1
25 TABLET, EXTENDED RELEASE ORAL DAILY
Qty: 45 | Refills: 3 | Status: ACTIVE | COMMUNITY
Start: 2025-06-04 | End: 1900-01-01

## 2025-06-10 ENCOUNTER — OUTPATIENT (OUTPATIENT)
Dept: OUTPATIENT SERVICES | Facility: HOSPITAL | Age: 70
LOS: 1 days | End: 2025-06-10
Payer: MEDICARE

## 2025-06-10 ENCOUNTER — APPOINTMENT (OUTPATIENT)
Dept: MRI IMAGING | Facility: CLINIC | Age: 70
End: 2025-06-10
Payer: MEDICARE

## 2025-06-10 DIAGNOSIS — D36.9 BENIGN NEOPLASM, UNSPECIFIED SITE: ICD-10-CM

## 2025-06-10 DIAGNOSIS — Z98.890 OTHER SPECIFIED POSTPROCEDURAL STATES: Chronic | ICD-10-CM

## 2025-06-10 PROCEDURE — 70553 MRI BRAIN STEM W/O & W/DYE: CPT | Mod: 26

## 2025-06-10 PROCEDURE — A9585: CPT

## 2025-06-10 PROCEDURE — 70553 MRI BRAIN STEM W/O & W/DYE: CPT

## 2025-06-12 ENCOUNTER — INPATIENT (INPATIENT)
Facility: HOSPITAL | Age: 70
LOS: 6 days | Discharge: HOME | DRG: 951 | End: 2025-06-19
Attending: FAMILY MEDICINE | Admitting: HOSPITALIST
Payer: MEDICARE

## 2025-06-12 VITALS
RESPIRATION RATE: 20 BRPM | WEIGHT: 220.68 LBS | TEMPERATURE: 99 F | HEART RATE: 66 BPM | SYSTOLIC BLOOD PRESSURE: 142 MMHG | DIASTOLIC BLOOD PRESSURE: 87 MMHG | OXYGEN SATURATION: 99 %

## 2025-06-12 DIAGNOSIS — Z98.890 OTHER SPECIFIED POSTPROCEDURAL STATES: Chronic | ICD-10-CM

## 2025-06-12 LAB
ALBUMIN SERPL ELPH-MCNC: 3.8 G/DL — SIGNIFICANT CHANGE UP (ref 3.3–5.2)
ALP SERPL-CCNC: 76 U/L — SIGNIFICANT CHANGE UP (ref 40–120)
ALT FLD-CCNC: 15 U/L — SIGNIFICANT CHANGE UP
ANION GAP SERPL CALC-SCNC: 17 MMOL/L — SIGNIFICANT CHANGE UP (ref 5–17)
APTT BLD: 29.3 SEC — SIGNIFICANT CHANGE UP (ref 26.1–36.8)
AST SERPL-CCNC: 19 U/L — SIGNIFICANT CHANGE UP
BASOPHILS # BLD AUTO: 0.05 K/UL — SIGNIFICANT CHANGE UP (ref 0–0.2)
BASOPHILS NFR BLD AUTO: 0.5 % — SIGNIFICANT CHANGE UP (ref 0–2)
BILIRUB SERPL-MCNC: 0.4 MG/DL — SIGNIFICANT CHANGE UP (ref 0.4–2)
BUN SERPL-MCNC: 18.1 MG/DL — SIGNIFICANT CHANGE UP (ref 8–20)
CALCIUM SERPL-MCNC: 8.8 MG/DL — SIGNIFICANT CHANGE UP (ref 8.4–10.5)
CHLORIDE SERPL-SCNC: 105 MMOL/L — SIGNIFICANT CHANGE UP (ref 96–108)
CO2 SERPL-SCNC: 22 MMOL/L — SIGNIFICANT CHANGE UP (ref 22–29)
CREAT SERPL-MCNC: 0.95 MG/DL — SIGNIFICANT CHANGE UP (ref 0.5–1.3)
EGFR: 87 ML/MIN/1.73M2 — SIGNIFICANT CHANGE UP
EGFR: 87 ML/MIN/1.73M2 — SIGNIFICANT CHANGE UP
EOSINOPHIL # BLD AUTO: 0 K/UL — SIGNIFICANT CHANGE UP (ref 0–0.5)
EOSINOPHIL NFR BLD AUTO: 0 % — SIGNIFICANT CHANGE UP (ref 0–6)
GLUCOSE SERPL-MCNC: 168 MG/DL — HIGH (ref 70–99)
HCT VFR BLD CALC: 46.4 % — SIGNIFICANT CHANGE UP (ref 39–50)
HGB BLD-MCNC: 16 G/DL — SIGNIFICANT CHANGE UP (ref 13–17)
IMM GRANULOCYTES # BLD AUTO: 0.05 K/UL — SIGNIFICANT CHANGE UP (ref 0–0.07)
IMM GRANULOCYTES NFR BLD AUTO: 0.5 % — SIGNIFICANT CHANGE UP (ref 0–0.9)
INR BLD: 0.99 RATIO — SIGNIFICANT CHANGE UP (ref 0.85–1.16)
LYMPHOCYTES # BLD AUTO: 2.33 K/UL — SIGNIFICANT CHANGE UP (ref 1–3.3)
LYMPHOCYTES NFR BLD AUTO: 25.5 % — SIGNIFICANT CHANGE UP (ref 13–44)
MCHC RBC-ENTMCNC: 31.3 PG — SIGNIFICANT CHANGE UP (ref 27–34)
MCHC RBC-ENTMCNC: 34.5 G/DL — SIGNIFICANT CHANGE UP (ref 32–36)
MCV RBC AUTO: 90.6 FL — SIGNIFICANT CHANGE UP (ref 80–100)
MONOCYTES # BLD AUTO: 0.68 K/UL — SIGNIFICANT CHANGE UP (ref 0–0.9)
MONOCYTES NFR BLD AUTO: 7.4 % — SIGNIFICANT CHANGE UP (ref 2–14)
NEUTROPHILS # BLD AUTO: 6.04 K/UL — SIGNIFICANT CHANGE UP (ref 1.8–7.4)
NEUTROPHILS NFR BLD AUTO: 66.1 % — SIGNIFICANT CHANGE UP (ref 43–77)
NRBC # BLD AUTO: 0 K/UL — SIGNIFICANT CHANGE UP (ref 0–0)
NRBC # FLD: 0 K/UL — SIGNIFICANT CHANGE UP (ref 0–0)
NRBC BLD AUTO-RTO: 0 /100 WBCS — SIGNIFICANT CHANGE UP (ref 0–0)
PLATELET # BLD AUTO: 200 K/UL — SIGNIFICANT CHANGE UP (ref 150–400)
PMV BLD: 10.2 FL — SIGNIFICANT CHANGE UP (ref 7–13)
POTASSIUM SERPL-MCNC: 3.6 MMOL/L — SIGNIFICANT CHANGE UP (ref 3.5–5.3)
POTASSIUM SERPL-SCNC: 3.6 MMOL/L — SIGNIFICANT CHANGE UP (ref 3.5–5.3)
PROT SERPL-MCNC: 6.1 G/DL — LOW (ref 6.6–8.7)
PROTHROM AB SERPL-ACNC: 11.2 SEC — SIGNIFICANT CHANGE UP (ref 9.9–13.4)
RBC # BLD: 5.12 M/UL — SIGNIFICANT CHANGE UP (ref 4.2–5.8)
RBC # FLD: 13.6 % — SIGNIFICANT CHANGE UP (ref 10.3–14.5)
SODIUM SERPL-SCNC: 144 MMOL/L — SIGNIFICANT CHANGE UP (ref 135–145)
TROPONIN T, HIGH SENSITIVITY RESULT: 11 NG/L — SIGNIFICANT CHANGE UP (ref 0–51)
WBC # BLD: 9.15 K/UL — SIGNIFICANT CHANGE UP (ref 3.8–10.5)
WBC # FLD AUTO: 9.15 K/UL — SIGNIFICANT CHANGE UP (ref 3.8–10.5)

## 2025-06-12 PROCEDURE — 99285 EMERGENCY DEPT VISIT HI MDM: CPT

## 2025-06-12 PROCEDURE — 71045 X-RAY EXAM CHEST 1 VIEW: CPT | Mod: 26

## 2025-06-12 PROCEDURE — 93010 ELECTROCARDIOGRAM REPORT: CPT

## 2025-06-12 RX ADMIN — Medication 125 MILLILITER(S): at 21:55

## 2025-06-12 NOTE — ED ADULT NURSE NOTE - OBJECTIVE STATEMENT
Patient is a 68yo male who presents to the ED with refer from cardiology as per pt he was altered to runs of svt from his heart monitor, pt has known history of brain tumor discovered last week, pt wife reports that pt has had increased confusion over the past week. Upon assessment, PT is alert and oriented, with a patent and self maintained airway, with non labored breathing. PT denies CP, SOB, HA, N/V/D, Fevers or chills.

## 2025-06-12 NOTE — ED ADULT TRIAGE NOTE - CHIEF COMPLAINT QUOTE
PT reports to ED as refer from cardiology as per pt he was altered to runs of svt from his heart monitor, pt has known history of brain tumor discovered last week, pt wife reports that pt has had increased confusion over the past week.

## 2025-06-13 DIAGNOSIS — I10 ESSENTIAL (PRIMARY) HYPERTENSION: ICD-10-CM

## 2025-06-13 DIAGNOSIS — Z86.59 PERSONAL HISTORY OF OTHER MENTAL AND BEHAVIORAL DISORDERS: ICD-10-CM

## 2025-06-13 DIAGNOSIS — I25.10 ATHEROSCLEROTIC HEART DISEASE OF NATIVE CORONARY ARTERY WITHOUT ANGINA PECTORIS: ICD-10-CM

## 2025-06-13 DIAGNOSIS — E78.5 HYPERLIPIDEMIA, UNSPECIFIED: ICD-10-CM

## 2025-06-13 LAB
APPEARANCE UR: CLEAR — SIGNIFICANT CHANGE UP
BILIRUB UR-MCNC: NEGATIVE — SIGNIFICANT CHANGE UP
COLOR SPEC: YELLOW — SIGNIFICANT CHANGE UP
DIFF PNL FLD: NEGATIVE — SIGNIFICANT CHANGE UP
GLUCOSE BLDC GLUCOMTR-MCNC: 109 MG/DL — HIGH (ref 70–99)
GLUCOSE BLDC GLUCOMTR-MCNC: 123 MG/DL — HIGH (ref 70–99)
GLUCOSE BLDC GLUCOMTR-MCNC: 80 MG/DL — SIGNIFICANT CHANGE UP (ref 70–99)
GLUCOSE UR QL: >=1000 MG/DL
KETONES UR QL: NEGATIVE MG/DL — SIGNIFICANT CHANGE UP
LEUKOCYTE ESTERASE UR-ACNC: NEGATIVE — SIGNIFICANT CHANGE UP
NITRITE UR-MCNC: NEGATIVE — SIGNIFICANT CHANGE UP
PH UR: 5.5 — SIGNIFICANT CHANGE UP (ref 5–8)
PROT UR-MCNC: NEGATIVE MG/DL — SIGNIFICANT CHANGE UP
SP GR SPEC: >1.03 — HIGH (ref 1–1.03)
TROPONIN T, HIGH SENSITIVITY RESULT: 11 NG/L — SIGNIFICANT CHANGE UP (ref 0–51)
TROPONIN T, HIGH SENSITIVITY RESULT: 12 NG/L — SIGNIFICANT CHANGE UP (ref 0–51)
UROBILINOGEN FLD QL: 0.2 MG/DL — SIGNIFICANT CHANGE UP (ref 0.2–1)

## 2025-06-13 PROCEDURE — 70450 CT HEAD/BRAIN W/O DYE: CPT | Mod: 26,XU

## 2025-06-13 PROCEDURE — 70498 CT ANGIOGRAPHY NECK: CPT | Mod: 26

## 2025-06-13 PROCEDURE — 99223 1ST HOSP IP/OBS HIGH 75: CPT

## 2025-06-13 PROCEDURE — 99222 1ST HOSP IP/OBS MODERATE 55: CPT

## 2025-06-13 PROCEDURE — 70496 CT ANGIOGRAPHY HEAD: CPT | Mod: 26

## 2025-06-13 PROCEDURE — 0042T: CPT

## 2025-06-13 RX ORDER — INSULIN LISPRO 100 U/ML
INJECTION, SOLUTION INTRAVENOUS; SUBCUTANEOUS
Refills: 0 | Status: DISCONTINUED | OUTPATIENT
Start: 2025-06-13 | End: 2025-06-19

## 2025-06-13 RX ORDER — DEXTROSE 50 % IN WATER 50 %
12.5 SYRINGE (ML) INTRAVENOUS ONCE
Refills: 0 | Status: DISCONTINUED | OUTPATIENT
Start: 2025-06-13 | End: 2025-06-19

## 2025-06-13 RX ORDER — DEXTROSE 50 % IN WATER 50 %
25 SYRINGE (ML) INTRAVENOUS ONCE
Refills: 0 | Status: DISCONTINUED | OUTPATIENT
Start: 2025-06-13 | End: 2025-06-19

## 2025-06-13 RX ORDER — LATANOPROST PF 0.05 MG/ML
1 SOLUTION/ DROPS OPHTHALMIC
Refills: 0 | DISCHARGE

## 2025-06-13 RX ORDER — ONDANSETRON HCL/PF 4 MG/2 ML
4 VIAL (ML) INJECTION EVERY 8 HOURS
Refills: 0 | Status: DISCONTINUED | OUTPATIENT
Start: 2025-06-13 | End: 2025-06-19

## 2025-06-13 RX ORDER — LATANOPROST PF 0.05 MG/ML
1 SOLUTION/ DROPS OPHTHALMIC AT BEDTIME
Refills: 0 | Status: DISCONTINUED | OUTPATIENT
Start: 2025-06-13 | End: 2025-06-19

## 2025-06-13 RX ORDER — DORZOLAMIDE HYDROCHLORIDE AND TIMOLOL MALEATE 20; 5 MG/ML; MG/ML
1 SOLUTION/ DROPS OPHTHALMIC
Refills: 0 | Status: DISCONTINUED | OUTPATIENT
Start: 2025-06-13 | End: 2025-06-19

## 2025-06-13 RX ORDER — INSULIN LISPRO 100 U/ML
INJECTION, SOLUTION INTRAVENOUS; SUBCUTANEOUS AT BEDTIME
Refills: 0 | Status: DISCONTINUED | OUTPATIENT
Start: 2025-06-13 | End: 2025-06-19

## 2025-06-13 RX ORDER — METOPROLOL SUCCINATE 50 MG/1
1 TABLET, EXTENDED RELEASE ORAL
Refills: 0 | DISCHARGE

## 2025-06-13 RX ORDER — SACUBITRIL AND VALSARTAN 49; 51 MG/1; MG/1
1 TABLET, FILM COATED ORAL
Refills: 0 | Status: DISCONTINUED | OUTPATIENT
Start: 2025-06-13 | End: 2025-06-19

## 2025-06-13 RX ORDER — MAGNESIUM, ALUMINUM HYDROXIDE 200-200 MG
30 TABLET,CHEWABLE ORAL EVERY 4 HOURS
Refills: 0 | Status: DISCONTINUED | OUTPATIENT
Start: 2025-06-13 | End: 2025-06-19

## 2025-06-13 RX ORDER — DEXTROSE 50 % IN WATER 50 %
15 SYRINGE (ML) INTRAVENOUS ONCE
Refills: 0 | Status: DISCONTINUED | OUTPATIENT
Start: 2025-06-13 | End: 2025-06-19

## 2025-06-13 RX ORDER — SODIUM CHLORIDE 9 G/1000ML
1000 INJECTION, SOLUTION INTRAVENOUS
Refills: 0 | Status: DISCONTINUED | OUTPATIENT
Start: 2025-06-13 | End: 2025-06-19

## 2025-06-13 RX ORDER — GLUCAGON 3 MG/1
1 POWDER NASAL ONCE
Refills: 0 | Status: DISCONTINUED | OUTPATIENT
Start: 2025-06-13 | End: 2025-06-19

## 2025-06-13 RX ORDER — DORZOLAMIDE HYDROCHLORIDE AND TIMOLOL MALEATE 20; 5 MG/ML; MG/ML
1 SOLUTION/ DROPS OPHTHALMIC
Refills: 0 | DISCHARGE

## 2025-06-13 RX ORDER — ATORVASTATIN CALCIUM 80 MG/1
40 TABLET, FILM COATED ORAL AT BEDTIME
Refills: 0 | Status: DISCONTINUED | OUTPATIENT
Start: 2025-06-13 | End: 2025-06-19

## 2025-06-13 RX ORDER — MELATONIN 5 MG
3 TABLET ORAL AT BEDTIME
Refills: 0 | Status: DISCONTINUED | OUTPATIENT
Start: 2025-06-13 | End: 2025-06-19

## 2025-06-13 RX ORDER — ACETAMINOPHEN 500 MG/5ML
650 LIQUID (ML) ORAL EVERY 6 HOURS
Refills: 0 | Status: DISCONTINUED | OUTPATIENT
Start: 2025-06-13 | End: 2025-06-19

## 2025-06-13 RX ORDER — ENOXAPARIN SODIUM 100 MG/ML
40 INJECTION SUBCUTANEOUS EVERY 24 HOURS
Refills: 0 | Status: DISCONTINUED | OUTPATIENT
Start: 2025-06-13 | End: 2025-06-16

## 2025-06-13 RX ORDER — CLOPIDOGREL BISULFATE 75 MG/1
75 TABLET, FILM COATED ORAL DAILY
Refills: 0 | Status: DISCONTINUED | OUTPATIENT
Start: 2025-06-13 | End: 2025-06-19

## 2025-06-13 RX ORDER — METOPROLOL SUCCINATE 50 MG/1
12.5 TABLET, EXTENDED RELEASE ORAL DAILY
Refills: 0 | Status: DISCONTINUED | OUTPATIENT
Start: 2025-06-13 | End: 2025-06-16

## 2025-06-13 RX ADMIN — ATORVASTATIN CALCIUM 40 MILLIGRAM(S): 80 TABLET, FILM COATED ORAL at 21:16

## 2025-06-13 RX ADMIN — Medication 125 MILLILITER(S): at 10:46

## 2025-06-13 RX ADMIN — SACUBITRIL AND VALSARTAN 1 TABLET(S): 49; 51 TABLET, FILM COATED ORAL at 18:34

## 2025-06-13 RX ADMIN — ENOXAPARIN SODIUM 40 MILLIGRAM(S): 100 INJECTION SUBCUTANEOUS at 18:34

## 2025-06-13 RX ADMIN — LATANOPROST PF 1 DROP(S): 0.05 SOLUTION/ DROPS OPHTHALMIC at 21:34

## 2025-06-13 RX ADMIN — DORZOLAMIDE HYDROCHLORIDE AND TIMOLOL MALEATE 1 DROP(S): 20; 5 SOLUTION/ DROPS OPHTHALMIC at 21:16

## 2025-06-13 NOTE — H&P ADULT - NSHPPHYSICALEXAM_GEN_ALL_CORE
CONSTITUTIONAL: NAD  ENMT: moist mucous membranes  EYES: +EOMI  CARDIAC: Regular rate, regular rhythm.  normal +S1, S2  RESPIRATORY: Clear to auscultation bilaterally  GASTROINTESTINAL: Abdomen soft, non-tender, no guarding  NEUROLOGICAL: Alert and oriented, poor attention; poor short-term recall   SKIN: warm, dry

## 2025-06-13 NOTE — CONSULT NOTE ADULT - PROBLEM SELECTOR RECOMMENDATION 2
Tele overnight for acute arrhythmias or recurrent angina symptoms  - check labs including CBC, BMP, hsT-T, ECG and CXR tonight  - continue home ASA, BB, ARB, Statin and low cholesterol diet, monitor LFTs  - will schedule for TTE to assess functional/structural status  - pain management as needed (NTG/MSO4)

## 2025-06-13 NOTE — ED ADULT NURSE REASSESSMENT NOTE - NS ED NURSE REASSESS COMMENT FT1
Pt is AOx3 NAD noted, pt resting comfortably in bed, side rails up and wheels locked. Pt breathing even and unlabored, pt denies any CP, SOB or HA. Pt was seen by CAROLEE Tirado, POC explained, pt verbalized understanding and has no acute complaints at this time.

## 2025-06-13 NOTE — CONSULT NOTE ADULT - PROBLEM SELECTOR RECOMMENDATION 4
Low cholesterol diet, daily exercise and optimal weight management reinforced  - continue home Statin therapy, follow LFTs  - check FLP in AM    case d/w Dr. Burkett

## 2025-06-13 NOTE — PATIENT PROFILE ADULT - NSTOBACCOQUITATTEMPT_GEN_A_CORE_SD
Patient Instructions by Lolis Trotter APN at 02/20/18 11:00 AM     Author:  Lolis Trotter APN Service:  (none) Author Type:  Nurse Practitioner     Filed:  02/20/18 11:02 AM Encounter Date:  2/20/2018 Status:  Signed     :  Lolis Trotter APN (Nurse Practitioner)            continue present medication and dosing schedule    Additional Educational Resources:  For additional resources regarding your symptoms, diagnosis, or further health information, please visit the Health Resources section on [email or web address] or the Online Health Resources section in [email or web address].          Revision History        User Key Date/Time User Provider Type Action    > [N/A] 02/20/18 11:02 AM Lolis Trotter APN Nurse Practitioner Sign            
none

## 2025-06-13 NOTE — CONSULT NOTE ADULT - ASSESSMENT
70yo M w/ PMHx of carotid disease, non obstructive CAD, HTN, HLD, intermittent LBBB, tobacco use, pituitary macroadenoma arrived from home after becoming confused.  As per wife patient was different when she picked him up.  Patient had difficulty finding words and then had 2 episodes of witnessed syncope.  Patient with cardiac monitor in place.  Cardiology called for witnessed syncope.  hsT-T: 11->12   ECG: NSR  70yo M w/ PMHx of carotid disease, non obstructive CAD, HTN, HLD, intermittent LBBB, tobacco use, pituitary macroadenoma arrived from home after becoming confused.  As per wife patient was different when she picked him up.  Patient had difficulty finding words and then had 2 episodes of witnessed syncope.  Patient with cardiac monitor in place.  Cardiology called for witnessed syncope.  hsT-T: 11->12   ECG: Sinus kat, LBBB, wide QRS

## 2025-06-13 NOTE — H&P ADULT - HISTORY OF PRESENT ILLNESS
69y/oM PMH CAD, HTN, HLD, DM, intermittent LBBB, pituitary macroadenoma, current smoker presenting to ER with confusion. Pt also with recent episodes near-syncope with blurry and "closing in" vision, which resolved after a couple minutes. Denies LOC, fall , head trauma. Denies fevers, chills, cp, sob, abd pain, n/v/c/d, urinary symptoms.     At time of evaluation, pt alert and oriented, but has poor attention and self-reports feeling confused.

## 2025-06-13 NOTE — CONSULT NOTE ADULT - SUBJECTIVE AND OBJECTIVE BOX
[facility]                                        [facility] [place]                                  Cindy Butler, & Mumtaz                                      [address]                                           [place], [place], [address]                                                [phone]        CC: Syncope and altered mental status     HISTORY:  The patient is a 69y Male who has had several episodes of syncope. He has been seen by cardiology and by Dr Mccarty of [facility] (now [facility]).  Brain MRI showed incidental pituitary adenoma.   He now presents with episode of severe confusion and visual obscuration. He stated it felt like vision was closing in from the periphery. Now resolved although he states he still feels "fuzzy."    PAST MEDICAL & SURGICAL HISTORY:  HTN (hypertension)  Hyperlipidemia  Unspecified disorder of synovium and tendon, right shoulder  Sprain of other specified parts of unspecified shoulder girdle, initial encounter  Neck mass  posterior  History of lumbar laminectomy  1995  H/O neck surgery 2001    MEDICATIONS  (STANDING):  atorvastatin 40 milliGRAM(s) Oral at bedtime  clopidogrel Tablet 75 milliGRAM(s) Oral daily  dextrose 5%. 1000 milliLiter(s) (50 mL/Hr) IV Continuous <Continuous>  dextrose 5%. 1000 milliLiter(s) (100 mL/Hr) IV Continuous <Continuous>  dextrose 50% Injectable 25 Gram(s) IV Push once  dextrose 50% Injectable 12.5 Gram(s) IV Push once  dextrose 50% Injectable 25 Gram(s) IV Push once  dorzolamide 2%/timolol 0.5% Ophthalmic Solution 1 Drop(s) Both EYES two times a day  enoxaparin Injectable 40 milliGRAM(s) SubCutaneous every 24 hours  glucagon  Injectable 1 milliGRAM(s) IntraMuscular once  insulin lispro (ADMELOG) corrective regimen sliding scale   SubCutaneous three times a day before meals  insulin lispro (ADMELOG) corrective regimen sliding scale   SubCutaneous at bedtime  latanoprost 0.005% Ophthalmic Solution 1 Drop(s) Both EYES at bedtime  metoprolol succinate ER 12.5 milliGRAM(s) Oral daily  sodium chloride 0.9%. 1000 milliLiter(s) (125 mL/Hr) IV Continuous <Continuous>  valsartan 40 milliGRAM(s) Oral daily    MEDICATIONS  (PRN):  acetaminophen     Tablet .. 650 milliGRAM(s) Oral every 6 hours PRN Temp greater or equal to 38C (100.4F), Mild Pain (1 - 3)  aluminum hydroxide/magnesium hydroxide/simethicone Suspension 30 milliLiter(s) Oral every 4 hours PRN Dyspepsia  dextrose Oral Gel 15 Gram(s) Oral once PRN Blood Glucose LESS THAN 70 milliGRAM(s)/deciliter  melatonin 3 milliGRAM(s) Oral at bedtime PRN Insomnia  ondansetron Injectable 4 milliGRAM(s) IV Push every 8 hours PRN Nausea and/or Vomiting    Allergies  No Known Allergies    SOCIAL HISTORY:  Smoker 1 p/d.  Occasional alcohol.   No drug use.    FAMILY HISTORY:  No known family history of stroke.     ROS:  Constitutional: The patient denies fevers or weight changes.  Neuro: As per HPI.  Eyes: Denies blurry vision.  Ears/nose/throat: Denies Tinnitus.   Cardiac: Denies chest pain. Denies palpitations.  Respiratory: Denies shortness of breath.  GI: Denies abdominal pain, nausea, or vomiting.  : Denies change in urinary pattern.  Integumentary: Denies rash.  Psych: Denies recent mood changes.  Heme: denies easy bleeding/bruising.    Exam:  Vital Signs Last 24 Hrs  T(C): 36.4 (13 Jun 2025 07:16), Max: 37.4 (12 Jun 2025 19:58)  T(F): 97.5 (13 Jun 2025 07:16), Max: 99.4 (12 Jun 2025 19:58)  HR: 52 (13 Jun 2025 07:16) (52 - 66)  BP: 149/82 (13 Jun 2025 07:16) (127/69 - 159/75)  RR: 18 (13 Jun 2025 07:16) (18 - 20)  SpO2: 98% (13 Jun 2025 07:16) (98% - 99%)    Parameters below as of 13 Jun 2025 07:16  Patient On (Oxygen Delivery Method): room air    General: NAD.   Carotid bruits absent.     Mental status: The patient is awake, alert, and fully oriented. There is no aphasia. Attention span is normal. Patient is aware of current events.     Cranial nerves: There is no papilledema. Pupils react symmetrically to light. There is no visual field deficit to confrontation. Extraocular motion is full with no nystagmus.  Facial sensation is intact. Facial musculature is symmetric. Palate elevates symmetrically. Tongue is midline.    Motor: There is normal bulk and tone.  Strength is 5/5 in the right arm and leg.   Strength is 5/5 in the left arm and leg.    Sensation: Intact to light touch and pin. There is no extinction to double simultaneous stimulation.    Reflexes: 2+ throughout and plantar responses are flexor.    Cerebellar: There is no dysmetria on finger to nose testing.    LABS:                         16.0   9.15  )-----------( 200      ( 12 Jun 2025 21:56 )             46.4       06-12    144  |  105  |  18.1  ----------------------------<  168[H]  3.6   |  22.0  |  0.95    Ca    8.8      12 Jun 2025 21:56    TPro  6.1[L]  /  Alb  3.8  /  TBili  0.4  /  DBili  x   /  AST  19  /  ALT  15  /  AlkPhos  76  06-12    PT/INR - ( 12 Jun 2025 21:56 )   PT: 11.2 sec;   INR: 0.99 ratio    PTT - ( 12 Jun 2025 21:56 )  PTT:29.3 sec    RADIOLOGY   CT head images reviewed (and concur with report): There is no acute pathology.   Unchanged pituitary macroadenoma with suprasellar extension abutting the optic chiasm measuring up to 1.5 x 1.1 x 1.1 cm  CT-A head and neck negative.

## 2025-06-13 NOTE — ED PROVIDER NOTE - OBJECTIVE STATEMENT
69-year-old male past medical history of pituitary macroadenoma SVT comes to the ED with confusion as witnessed by his wife and Dr. Mccarty patient's neurologist.  As per wife she noticed the difference in the patient when she was picked up after her operation.  Patient had difficulty finding words.  patient denies any symptoms at this time.  Patient has had 2 episodes of syncope and is presently has a cardiac monitor.  Patient is followed by Dr. Tania gonzales of cardiology

## 2025-06-13 NOTE — PATIENT PROFILE ADULT - FALL HARM RISK - CONCLUSION
Patient: Ceci Cnatu    Procedure Summary       Date: 12/31/24 Room / Location:  [facility] OR 2 /  [facility]    Anesthesia Start: 0857 Anesthesia Stop: 0921    Procedure: INTRAUTERINE DEVICE INSERTION (Vagina) Diagnosis:       Encounter for IUD insertion      (Encounter for IUD insertion [Z30.430])    Surgeons: Adriana Almonte MD Provider: Samuel Wilson CRNA    Anesthesia Type: MAC ASA Status: 2            Anesthesia Type: MAC    Vitals  Vitals Value Taken Time   /53 12/31/24 0950   Temp 97.6 °F (36.4 °C) 12/31/24 0925   Pulse 95 12/31/24 0952   Resp 16 12/31/24 0940   SpO2 94 % 12/31/24 0952   Vitals shown include unfiled device data.        Post Anesthesia Care and Evaluation    Patient location during evaluation: bedside  Patient participation: complete - patient participated  Level of consciousness: awake and alert  Pain score: 0  Pain management: adequate    Airway patency: patent  Anesthetic complications: No anesthetic complications  PONV Status: none  Cardiovascular status: acceptable  Respiratory status: acceptable  Hydration status: acceptable  No anesthesia care post op     Fall with Harm Risk

## 2025-06-13 NOTE — PATIENT PROFILE ADULT - HAVE YOU RECENTLY LOST WEIGHT WITHOUT TRYING?
How Severe Is Your Rash?: moderate Is This A New Presentation, Or A Follow-Up?: Rash Additional History: Had flu shot in October. Shingles in August.  Coming and going for 2 months and has used cortisone. No (0)

## 2025-06-13 NOTE — CONSULT NOTE ADULT - PROBLEM SELECTOR RECOMMENDATION 9
Admit and monitor on tele to r/o arrhythmic causes, check CBC, CMP, TFTs, hsT-T, ECG, CXR  - causes include: carotid sinus hypersensitivity, arrhythmia, mechanical, orthostatic hypotension: low volume, diuretics, accidental falls, seizures, sleep disturbances, medications (BB, CCBs, antiarrhythmics), metabolic disorders, and some psychiatric conditions  - monitor for life threatening diagnosis: arrhythmia, critical AS, dissection, PE, large hemorrhage, SAH  - high risk features: advanced age, recurrent syncope, abnormal ECG  - patient will require MCOT interrogation   - schedule for Head CT to r/o acute neuro pathology  - TTE to evaluate for structural, valvular disease  - check orthostatics to r/o cause for syncope (after standing)

## 2025-06-13 NOTE — H&P ADULT - NSHPLABSRESULTS_GEN_ALL_CORE
16.0   9.15  )-----------( 200      ( 12 Jun 2025 21:56 )             46.4     06-12    144  |  105  |  18.1  ----------------------------<  168[H]  3.6   |  22.0  |  0.95    Ca    8.8      12 Jun 2025 21:56    TPro  6.1[L]  /  Alb  3.8  /  TBili  0.4  /  DBili  x   /  AST  19  /  ALT  15  /  AlkPhos  76  06-12    < from: CT Angio Neck w/ IV Cont (06.13.25 @ 01:19) >    IMPRESSION:    CT HEAD:  No acute intracranial hemorrhage, mass effect, or CT evidence of an acute   vascular territorial infarct. Mild chronic ischemic changes in the   cerebral white matter.    Unchanged pituitary macroadenoma with suprasellar extension abutting the   optic chiasm measuring up to 1.5 x 1.1 x 1.1 cm. [place] is referred to   MRI of the brain from 6/10/2025.      CT PERFUSION:  Perfusion imaging was not postprocessed and could not be evaluated.    CTA NECK:  No evidence of significant stenosis or occlusion.    CTA HEAD:  No large vessel occlusion, significant stenosis or vascular abnormality   identified.    < end of copied text >

## 2025-06-13 NOTE — ED ADULT NURSE REASSESSMENT NOTE - NS ED NURSE REASSESS COMMENT FT1
Assumed care from previous RN @ 0710. Patient presented to ED for periods of confusion over the last couple of weeks. Hx of brain tumor that was found last week. Patient also had runs of SVT on his monitor at home. Patient bradycardic on CM between 45 and 50. A&Ox4 at this time. Aware of POC and admission to telemetry. Offers no complaints at this time.

## 2025-06-13 NOTE — ED PROVIDER NOTE - CLINICAL SUMMARY MEDICAL DECISION MAKING FREE TEXT BOX
history of pituitary macroadenoma with mental status changes question neuro versus cardiac will obtain CAT scan CTA of head and neck cardiac monitoring labs cardiology consult neuro consult consider for observation.

## 2025-06-13 NOTE — CONSULT NOTE ADULT - ASSESSMENT
The patient is a 69y Male with recurrent syncope and now with episode of altered mental status and vision changes.     Episode of altered mental status  Possible TIA.   Will repeat brain MRI to assess for new infarct.   Will add MRI sella to study as has pituitary lesion.    Syncope  Will check EEG to rule out epileptiform abnormalities.   Seen by cardiology.    Case discussed with Dr Rand.

## 2025-06-13 NOTE — PATIENT PROFILE ADULT - DOES PATIENT HAVE ADVANCE DIRECTIVE
Render In Strict Bullet Format?: No
Detail Level: Simple
Continue Regimen: Clobetasol scalp solution 0.05%\\nKetoconazole shampoos 2%
Plan: Rf given
Yes

## 2025-06-13 NOTE — ED ADULT NURSE REASSESSMENT NOTE - NS ED NURSE REASSESS COMMENT FT1
Pt is AOx3 NAD noted, pt resting comfortably in bed, side rails up and wheels locked. Pt breathing even and unlabored, pt denies any CP, SOB or HA. Pt is awaiting CT results, POC explained, pt verbalized understanding and has no acute complaints at this time.

## 2025-06-13 NOTE — H&P ADULT - ASSESSMENT
69y/oM PMH CAD, HTN, HLD, DM, intermittent LBBB, pituitary macroadenoma, current smoker presenting to ER with confusion. Pt also with recent episodes near-syncope with blurry and "closing in" vision, which resolved after a couple minutes, has MCOT in place. Pt admitted for further work up     AMS   Near syncope   -admit to tele   -CTH: No ICH, mass effect or infarct, +mild chronic ischemic changes in white matter ; +unchanged appearance of pituitary macroadenoma with suprasellar extension abutting optic chiasm ; +intracranial carotid arteries   -CTA Head/neck: no evidence significant stenosis or occlusion, no LVO, significant stenosis or vascular abnormality   -CT perfusion not post-processed, could not be evaluated   -f/u orthostatics   -trops flat, cont to trend   -cardio consult appreciated   -f/u TTE   -f/u MCOT interrogation     CAD s/p PCI   HTN, HLD  -monitor on tele   -cont home meds: metoprolol succinate 12.5mg qd, atorvastatin 40mg qd, valsartan-hctz 40/12.5mg (0.5 tab qd), plavix 75mg qd  -pt reports only taking plavix currently, no aspirin      69y/oM PMH CAD, HTN, HLD, DM, intermittent LBBB, pituitary macroadenoma, current smoker presenting to ER with confusion. Pt also with recent episodes near-syncope with blurry and "closing in" vision, which resolved after a couple minutes, has MCOT in place. Pt admitted for further work up     AMS   Near syncope   r/o cardiac, neuro event   -admit to tele   -CTH: No ICH, mass effect or infarct, +mild chronic ischemic changes in white matter ; +unchanged appearance of pituitary macroadenoma with suprasellar extension abutting optic chiasm ; +intracranial carotid arteries   -CTA Head/neck: no evidence significant stenosis or occlusion, no LVO, significant stenosis or vascular abnormality   -CT perfusion not post-processed, could not be evaluated   -recent MRI head w/wo contrast: findings compatible w/pituitary macroadenoma with mild mass effect on optic chiasm, no signal abnormality in optic chiasm; mild-mod chronic microvascular ischemic disease, mild diffuse cerebral volume loss  -f/u orthostatics   -trops flat, cont to trend   -cardio consult appreciated   -f/u neuro consult, called by ER   -f/u TTE   -f/u MCOT interrogation   -f/u TFTs  -f/u prolactin     CAD s/p PCI   HTN, HLD  -monitor on tele   -cont home meds: metoprolol succinate 12.5mg qd, atorvastatin 40mg qd, valsartan-hctz 40/12.5mg (0.5 tab qd), plavix 75mg qd  -pt reports only taking plavix currently, no aspirin     DM   -hold home Jardiance  -f/u HbA1c   -ISS     Glaucoma   -cont eye gtts     Tobacco dependence   -cessation advised   -declines NRT     vte ppx: lovenox sq  69y/oM PMH CAD, HTN, HLD, DM, intermittent LBBB, pituitary macroadenoma, current smoker presenting to ER with confusion. Pt also with recent episodes near-syncope with blurry and "closing in" vision, which resolved after a couple minutes, has MCOT in place. Pt admitted for further work up     AMS   Near syncope   r/o cardiac, neuro event   -admit to tele   -CTH: No ICH, mass effect or infarct, +mild chronic ischemic changes in white matter ; +unchanged appearance of pituitary macroadenoma with suprasellar extension abutting optic chiasm ; +intracranial carotid arteries   -CTA Head/neck: no evidence significant stenosis or occlusion, no LVO, significant stenosis or vascular abnormality   -CT perfusion not post-processed, could not be evaluated   -recent MRI head w/wo contrast: findings compatible w/pituitary macroadenoma with mild mass effect on optic chiasm, no signal abnormality in optic chiasm; mild-mod chronic microvascular ischemic disease, mild diffuse cerebral volume loss  -f/u orthostatics   -trops flat, cont to trend   -cardio consult appreciated   -neuro consult appreciated, d/w Dr. Severino   -f/u TTE   -f/u MCOT interrogation   -f/u repeat MRI   -f/u EEG  -f/u TFTs  -f/u prolactin     CAD s/p PCI   HTN, HLD  -monitor on tele   -cont home meds: metoprolol succinate 12.5mg qd, atorvastatin 40mg qd, valsartan-hctz 40/12.5mg (0.5 tab qd), plavix 75mg qd  -pt reports only taking plavix currently, no aspirin     DM   -hold home Jardiance  -f/u HbA1c   -ISS     Glaucoma   -cont eye gtts     Tobacco dependence   -cessation advised   -declines NRT     vte ppx: lovenox sq

## 2025-06-13 NOTE — CONSULT NOTE ADULT - SUBJECTIVE AND OBJECTIVE BOX
[facility]                                              CARDIOLOGY AT [place] [address]                                             Telephone: [phone]. Fax:[phone]                                                       CARDIOLOGY CONSULTATION NOTE                                                                                             History obtained by: Patient and medical record  Community Cardiologist: Dr. Hernandes   obtained: Yes [  ] No [  ]  Reason for Consultation: change of mental status   Available out pt records reviewed: Yes [x] No [  ]    Chief complaint:  change of mental status    HPI:  Patient is a 68yo M w/ PMHx of carotid disease, non obstructive CAD, HTN, HLD, intermittent LBBB, tobacco use, pituitary macroadenoma arrived from home after becoming confused.  As per wife patient was different when she picked him up.  Patient had difficulty finding words and then had 2 episodes of witnessed syncope.  Patient with cardiac monitor in place.      CARDIAC TESTING   ECHO:  STRESS:    CATH:   Study Date:     2023   Name:           KAMLESH NOE   :            1955   (67 years)   Gender:         male   MR#:            [id number]   MPI#:           [id number]   Patient Class:  Outpatient   Cath Lab Report    Diagnostic Cardiologist:       Homar Sutton MD   Interventional Cardiologist:   Homar Sutton MD   Referring Physician:           Luis F Hernandes MD   Procedures Performed   Procedures:  1.    Arterial Access - Right Radial   2.    Diagnostic Coronary Angiography   3.    PCI: PRAKASH   Indications:    Abnormal stress test  CCS Class II   Conclusions: There is significant single vessel CAD involving the Ramus Intermedius  Successful PCI of the Ramus Intermedius with PRAKASH x 1 with excellent angiographic appearance post intervention,   Recommendations:   Continue dual antiplatelet therapy with ASA-81 and Plavix-75 daily for at least six months.  Acute complication:    No complications   Presentation: 68yo man with worsening angina at home, CCS II symptoms despite antianginal therapies, had a positive stress test with ischemia to the anteroseptum.      ELECTROPHYSIOLOGY:     PAST MEDICAL HISTORY  HTN (hypertension)  Hyperlipidemia  CAD  Unspecified disorder of synovium and tendon, right shoulder  Sprain of other specified parts of unspecified shoulder girdle, initial encounter  Neck mass    PAST SURGICAL HISTORY  History of lumbar laminectomy  H/O neck surgery  s/p DESx1     SOCIAL HISTORY:  Denies smoking/alcohol/drugs    FAMILY HISTORY:    Family History of Cardiovascular Disease:  Yes [  ] No [  ]  Coronary Artery Disease in first degree relative: Yes [  ] No [  ]  Sudden Cardiac Death in First degree relative: Yes [  ] No [  ]    HOME MEDICATIONS:  Diovan 80 mg oral tablet: 1 orally (2023 20:38)  Jardiance 10 mg oral tablet: 1 orally (2023 20:38)  spironolactone 25 mg oral tablet: 1 orally (2023 20:38)    CURRENT OTHER MEDICATIONS:  sodium chloride 0.9%. 1000 milliLiter(s) (125 mL/Hr) IV Continuous <Continuous>    ALLERGIES: No Known Allergies    REVIEW OF SYMPTOMS:   CONSTITUTIONAL: No fever, no chills, no weight loss, no weight gain, no fatigue   ENMT:  No vertigo; No sinus or throat pain  NECK: No pain or stiffness  CARDIOVASCULAR: No chest pain, no dyspnea, + syncope/presyncope, no palpitations, no dizziness, no Orthopnea, no Paroxsymal nocturnal dyspnea  RESPIRATORY: no Shortness of breath, no cough, no wheezing  GI: no nausea, no diarrhea, no constipation, no abdominal pain   NEURO: No headache, no slurred speech   MUSCULOSKELETAL: No joint pain or swelling  PSYCH: No agitation, no anxiety  ALL OTHER REVIEW OF SYSTEMS ARE NEGATIVE.    VITAL SIGNS:  T(C): 36.6 (25 @ 04:25), Max: 37.4 (25 @ 19:58)  T(F): 97.8 (25 @ 04:25), Max: 99.4 (25 @ 19:58)  HR: 64 (25 @ 04:25) (57 - 66)  BP: 159/75 (25 @ 04:25) (127/69 - 159/75)  RR: 20 (25 @ 04:25) (20 - 20)  SpO2: 98% (25 @ 04:25) (98% - 99%)    INTAKE AND OUTPUT:     PHYSICAL EXAM:  Constitutional: Comfortable, no acute distress   HEENT: Atraumatic, neck is supple, no JVD  CNS: A&Ox3. No focal deficits  Respiratory: CTAB, unlabored   Cardiovascular: RRR normal S1S2, no murmur or rubs  Gastrointestinal: Soft, non-tender  Extremities: 2+ Peripheral Pulses, no cyanosis, or edema  Psychiatric: Calm  Skin: Warm and dry, no ulcers on extremities     LABS:                        16.0   9.15  )-----------( 200      ( 2025 21:56 )             46.4     06-12    144  |  105  |  18.1  ----------------------------<  168[H]  3.6   |  22.0  |  0.95    Ca    8.8      2025 21:56    TPro  6.1[L]  /  Alb  3.8  /  TBili  0.4  /  DBili  x   /  AST  19  /  ALT  15  /  AlkPhos  76  06-12    PT: 11.2 sec;   INR: 0.99 ratio      PTT:29.3 sec    Urinalysis Basic - ( 2025 21:56 )  Color: x / Appearance: x / SG: x / pH: x  Gluc: 168 mg/dL / Ketone: x  / Bili: x / Urobili: x   Blood: x / Protein: x / Nitrite: x   Leuk Esterase: x / RBC: x / WBC x   Sq Epi: x / Non Sq Epi: x / Bacteria: x    INTERPRETATION OF TELEMETRY:   ECG:   Prior ECG: Yes [x] No [  ]    RADIOLOGY & ADDITIONAL STUDIES:    X-ray:    CT scan: IMPRESSION:  CT HEAD: No acute intracranial hemorrhage, mass effect, or CT evidence of an acute vascular territorial infarct. Mild chronic ischemic changes in the cerebral white matter.  Unchanged pituitary macroadenoma with suprasellar extension abutting the optic chiasm measuring up to 1.5 x 1.1 x 1.1 cm. [place] is referred to MRI of the brain from 6/10/2025.  CT PERFUSION: Perfusion imaging was not postprocessed and could not be evaluated.  CTA NECK: No evidence of significant stenosis or occlusion.  CTA HEAD: No large vessel occlusion, significant stenosis or vascular abnormality identified.    Head MRI: IMPRESSION:  1.  Findings compatible with a pituitary macroadenoma with very mild mass effect on the optic chiasm as detailed above. No signal abnormality in the optic chiasm.  2.  Mild-to-moderate chronic microvascular ischemic disease.  3.  Mild diffuse cerebral volume loss.  4.  Sinus disease.                                                [facility]                                              CARDIOLOGY AT [facility]                                                   [address] [facility] [place] [address]                                             Telephone: [phone]. Fax:[phone]                                                       CARDIOLOGY CONSULTATION NOTE                                                                                             History obtained by: Patient and medical record  Community Cardiologist: Dr. Hernandes   obtained: Yes [  ] No [  ]  Reason for Consultation: change of mental status near syncope  Available out pt records reviewed: Yes [x] No [  ]    Chief complaint:  change of mental status vs. near syncope    HPI:  Patient is a 70yo M w/ PMHx of carotid disease, non obstructive CAD, HTN, HLD, intermittent LBBB, tobacco use, pituitary macroadenoma arrived from home after becoming confused and weak.  Patient states he was driving wife home from MD appointment, when suddenly his vision became blurry and grey and had to pull over the road.  Waited about 5 minutes and his symptoms resolved.  Episode happened 2 more times.  Wife brought patient in for evaluation.  Patient had difficulty finding words and then had 2 episodes of witnessed near syncope.  Currently awake and alert and has MCOT in place for past 21 days.  Denies seizures, HA, fever, sick contacts, chest pain, palpitations or diaphoresis.  ECG: Sinus kat and LBBB (old)  hsT-T: 11->12   Cardiology called for consult.        CARDIAC TESTING   ECHO:  STRESS:    CATH:   Study Date:     2023   Name:           KAMLESH NOE   :            1955   (67 years)   Gender:         male   MR#:            [id number]   MPI#:           [id number]   Patient Class:  Outpatient   Cath Lab Report    Diagnostic Cardiologist:       Homar Sutton MD   Interventional Cardiologist:   Homar Sutton MD   Referring Physician:           Luis F Hernandes MD   Procedures Performed   Procedures:  1.    Arterial Access - Right Radial   2.    Diagnostic Coronary Angiography   3.    PCI: PRAKASH   Indications:    Abnormal stress test  CCS Class II   Conclusions: There is significant single vessel CAD involving the Ramus Intermedius  Successful PCI of the Ramus Intermedius with PRAKASH x 1 with excellent angiographic appearance post intervention,   Recommendations:   Continue dual antiplatelet therapy with ASA-81 and Plavix-75 daily for at least six months.  Acute complication:    No complications   Presentation: 68yo man with worsening angina at home, CCS II symptoms despite antianginal therapies, had a positive stress test with ischemia to the anteroseptum.      ELECTROPHYSIOLOGY:     PAST MEDICAL HISTORY  HTN (hypertension)  Hyperlipidemia  CAD  Unspecified disorder of synovium and tendon, right shoulder  Sprain of other specified parts of unspecified shoulder girdle, initial encounter  Neck mass    PAST SURGICAL HISTORY  History of lumbar laminectomy  H/O neck surgery  s/p DESx1     SOCIAL HISTORY:  Denies smoking/alcohol/drugs    FAMILY HISTORY:    Family History of Cardiovascular Disease:  Yes [  ] No [  ]  Coronary Artery Disease in first degree relative: Yes [  ] No [  ]  Sudden Cardiac Death in First degree relative: Yes [  ] No [  ]    HOME MEDICATIONS:  Diovan 80 mg oral tablet: 1 orally (2023 20:38)  Jardiance 10 mg oral tablet: 1 orally (2023 20:38)  spironolactone 25 mg oral tablet: 1 orally (2023 20:38)    CURRENT OTHER MEDICATIONS:  sodium chloride 0.9%. 1000 milliLiter(s) (125 mL/Hr) IV Continuous <Continuous>    ALLERGIES: No Known Allergies    REVIEW OF SYMPTOMS:   CONSTITUTIONAL: No fever, no chills, no weight loss, no weight gain, no fatigue   ENMT:  No vertigo; No sinus or throat pain  NECK: No pain or stiffness  CARDIOVASCULAR: No chest pain, no dyspnea, + syncope/presyncope, no palpitations, no dizziness, no Orthopnea, no Paroxsymal nocturnal dyspnea  RESPIRATORY: no Shortness of breath, no cough, no wheezing  GI: no nausea, no diarrhea, no constipation, no abdominal pain   NEURO: No headache, no slurred speech   MUSCULOSKELETAL: No joint pain or swelling  PSYCH: No agitation, no anxiety  ALL OTHER REVIEW OF SYSTEMS ARE NEGATIVE.    VITAL SIGNS:  T(C): 36.6 (25 @ 04:25), Max: 37.4 (25 @ 19:58)  T(F): 97.8 (25 @ 04:25), Max: 99.4 (25 @ 19:58)  HR: 64 (25 @ 04:25) (57 - 66)  BP: 159/75 (25 @ 04:25) (127/69 - 159/75)  RR: 20 (25 @ 04:25) (20 - 20)  SpO2: 98% (25 @ 04:25) (98% - 99%)    INTAKE AND OUTPUT:     PHYSICAL EXAM:  Constitutional: Comfortable, no acute distress   HEENT: Atraumatic, neck is supple, no JVD  CNS: A&Ox3. No focal deficits  Respiratory: CTAB, unlabored   Cardiovascular: RRR normal S1S2, no murmur or rubs  Gastrointestinal: Soft, non-tender  Extremities: 2+ Peripheral Pulses, no cyanosis, or edema  Psychiatric: Calm  Skin: Warm and dry, no ulcers on extremities     LABS:                        16.0   9.15  )-----------( 200      ( 2025 21:56 )             46.4         144  |  105  |  18.1  ----------------------------<  168[H]  3.6   |  22.0  |  0.95    Ca    8.8      2025 21:56    TPro  6.1[L]  /  Alb  3.8  /  TBili  0.4  /  DBili  x   /  AST  19  /  ALT  15  /  AlkPhos  76  -12    PT: 11.2 sec;   INR: 0.99 ratio      PTT:29.3 sec    Urinalysis Basic - ( 2025 21:56 )  Color: x / Appearance: x / SG: x / pH: x  Gluc: 168 mg/dL / Ketone: x  / Bili: x / Urobili: x   Blood: x / Protein: x / Nitrite: x   Leuk Esterase: x / RBC: x / WBC x   Sq Epi: x / Non Sq Epi: x / Bacteria: x    INTERPRETATION OF TELEMETRY: Sinus kat    ECG: Sinus kat, LBBB (old)    Prior ECG: Yes [x] No [  ]    RADIOLOGY & ADDITIONAL STUDIES:    X-ray:    CT scan: IMPRESSION:  CT HEAD: No acute intracranial hemorrhage, mass effect, or CT evidence of an acute vascular territorial infarct. Mild chronic ischemic changes in the cerebral white matter.  Unchanged pituitary macroadenoma with suprasellar extension abutting the optic chiasm measuring up to 1.5 x 1.1 x 1.1 cm. [place] is referred to MRI of the brain from 6/10/2025.  CT PERFUSION: Perfusion imaging was not postprocessed and could not be evaluated.  CTA NECK: No evidence of significant stenosis or occlusion.  CTA HEAD: No large vessel occlusion, significant stenosis or vascular abnormality identified.    Head MRI: IMPRESSION:  1.  Findings compatible with a pituitary macroadenoma with very mild mass effect on the optic chiasm as detailed above. No signal abnormality in the optic chiasm.  2.  Mild-to-moderate chronic microvascular ischemic disease.  3.  Mild diffuse cerebral volume loss.  4.  Sinus disease.

## 2025-06-13 NOTE — ED ADULT NURSE REASSESSMENT NOTE - NS ED NURSE REASSESS COMMENT FT1
Report given to RN in cath lab. Patient to be cleared by neurology before being sent for catheterization.

## 2025-06-13 NOTE — PROVIDER CONTACT NOTE (OTHER) - SITUATION
Pt having intermittent episodes of bradycardia while asleep down to 42bpm, confirmed with EKG monitor tech, tele strip printed and placed in chart.

## 2025-06-13 NOTE — ED ADULT NURSE REASSESSMENT NOTE - NS ED NURSE REASSESS COMMENT FT1
Spoke with Dr Rand about patient's POC. Patient has to be cleared by neuro prior to having cardiac catheterization which is to take place next week. Spoke with patient's daughter on the phone who was updated on the POC.

## 2025-06-13 NOTE — ED ADULT NURSE REASSESSMENT NOTE - NS ED NURSE REASSESS COMMENT FT1
Pt is AOx3 NAD noted, pt resting comfortably in bed, side rails up and wheels locked. Pt breathing even and unlabored, pt denies any CP, SOB or HA. Pt is awaiting CT scan, POC explained, pt verbalized understanding and has no acute complaints at this time.

## 2025-06-13 NOTE — CONSULT NOTE ADULT - PROBLEM SELECTOR RECOMMENDATION 3
Assess for target organ damage (CKD, papilledema, encephalopathy) BP goal<130/80mmHg  - lifestyle modifications (DASH diet, daily exercise encouraged, weight loss, limit ETOH intake, avoid NSAIDs)   - VS as per unit protocol  - pharmacologic options: Thiazide (Chlorthalidone) with normal GFR, ARBs, BB

## 2025-06-13 NOTE — ED PROVIDER NOTE - PROGRESS NOTE DETAILS
diane:  pt signed out to me by dr. deluna pending CTs. CT neg. cards evaluated and recommended admission for syncope. neuro paged for AMS.  case d/w dr. hudson for admission.

## 2025-06-14 ENCOUNTER — RESULT REVIEW (OUTPATIENT)
Age: 70
End: 2025-06-14

## 2025-06-14 DIAGNOSIS — I47.29 OTHER VENTRICULAR TACHYCARDIA: ICD-10-CM

## 2025-06-14 LAB
A1C WITH ESTIMATED AVERAGE GLUCOSE RESULT: 5.8 % — HIGH (ref 4–5.6)
ALBUMIN SERPL ELPH-MCNC: 3.7 G/DL — SIGNIFICANT CHANGE UP (ref 3.3–5.2)
ALP SERPL-CCNC: 68 U/L — SIGNIFICANT CHANGE UP (ref 40–120)
ALT FLD-CCNC: 13 U/L — SIGNIFICANT CHANGE UP
AMPHET UR-MCNC: NEGATIVE — SIGNIFICANT CHANGE UP
ANION GAP SERPL CALC-SCNC: 11 MMOL/L — SIGNIFICANT CHANGE UP (ref 5–17)
AST SERPL-CCNC: 21 U/L — SIGNIFICANT CHANGE UP
B BURGDOR C6 AB SER-ACNC: NEGATIVE — SIGNIFICANT CHANGE UP
B BURGDOR IGG+IGM SER-ACNC: 0.06 INDEX — SIGNIFICANT CHANGE UP (ref 0.01–0.9)
BARBITURATES UR SCN-MCNC: NEGATIVE — SIGNIFICANT CHANGE UP
BASOPHILS # BLD AUTO: 0.04 K/UL — SIGNIFICANT CHANGE UP (ref 0–0.2)
BASOPHILS NFR BLD AUTO: 0.5 % — SIGNIFICANT CHANGE UP (ref 0–2)
BENZODIAZ UR-MCNC: NEGATIVE — SIGNIFICANT CHANGE UP
BILIRUB DIRECT SERPL-MCNC: 0.1 MG/DL — SIGNIFICANT CHANGE UP (ref 0–0.3)
BILIRUB INDIRECT FLD-MCNC: 0.6 MG/DL — SIGNIFICANT CHANGE UP (ref 0.2–1)
BILIRUB SERPL-MCNC: 0.7 MG/DL — SIGNIFICANT CHANGE UP (ref 0.4–2)
BUN SERPL-MCNC: 18.2 MG/DL — SIGNIFICANT CHANGE UP (ref 8–20)
CALCIUM SERPL-MCNC: 8.6 MG/DL — SIGNIFICANT CHANGE UP (ref 8.4–10.5)
CHLORIDE SERPL-SCNC: 106 MMOL/L — SIGNIFICANT CHANGE UP (ref 96–108)
CHOLEST SERPL-MCNC: 108 MG/DL — SIGNIFICANT CHANGE UP
CO2 SERPL-SCNC: 23 MMOL/L — SIGNIFICANT CHANGE UP (ref 22–29)
COCAINE METAB.OTHER UR-MCNC: NEGATIVE — SIGNIFICANT CHANGE UP
CREAT SERPL-MCNC: 0.82 MG/DL — SIGNIFICANT CHANGE UP (ref 0.5–1.3)
EGFR: 95 ML/MIN/1.73M2 — SIGNIFICANT CHANGE UP
EGFR: 95 ML/MIN/1.73M2 — SIGNIFICANT CHANGE UP
EOSINOPHIL # BLD AUTO: 0 K/UL — SIGNIFICANT CHANGE UP (ref 0–0.5)
EOSINOPHIL NFR BLD AUTO: 0 % — SIGNIFICANT CHANGE UP (ref 0–6)
ESTIMATED AVERAGE GLUCOSE: 120 MG/DL — HIGH (ref 68–114)
FENTANYL UR QL SCN: NEGATIVE — SIGNIFICANT CHANGE UP
GLUCOSE BLDC GLUCOMTR-MCNC: 110 MG/DL — HIGH (ref 70–99)
GLUCOSE BLDC GLUCOMTR-MCNC: 129 MG/DL — HIGH (ref 70–99)
GLUCOSE BLDC GLUCOMTR-MCNC: 78 MG/DL — SIGNIFICANT CHANGE UP (ref 70–99)
GLUCOSE BLDC GLUCOMTR-MCNC: 89 MG/DL — SIGNIFICANT CHANGE UP (ref 70–99)
GLUCOSE SERPL-MCNC: 86 MG/DL — SIGNIFICANT CHANGE UP (ref 70–99)
HCT VFR BLD CALC: 47.3 % — SIGNIFICANT CHANGE UP (ref 39–50)
HDLC SERPL-MCNC: 29 MG/DL — LOW
HGB BLD-MCNC: 16.3 G/DL — SIGNIFICANT CHANGE UP (ref 13–17)
IMM GRANULOCYTES # BLD AUTO: 0.02 K/UL — SIGNIFICANT CHANGE UP (ref 0–0.07)
IMM GRANULOCYTES NFR BLD AUTO: 0.2 % — SIGNIFICANT CHANGE UP (ref 0–0.9)
LDLC SERPL-MCNC: 56 MG/DL — SIGNIFICANT CHANGE UP
LIPID PNL WITH DIRECT LDL SERPL: 56 MG/DL — SIGNIFICANT CHANGE UP
LYMPHOCYTES # BLD AUTO: 2.15 K/UL — SIGNIFICANT CHANGE UP (ref 1–3.3)
LYMPHOCYTES NFR BLD AUTO: 24.8 % — SIGNIFICANT CHANGE UP (ref 13–44)
MAGNESIUM SERPL-MCNC: 1.9 MG/DL — SIGNIFICANT CHANGE UP (ref 1.6–2.6)
MCHC RBC-ENTMCNC: 31.3 PG — SIGNIFICANT CHANGE UP (ref 27–34)
MCHC RBC-ENTMCNC: 34.5 G/DL — SIGNIFICANT CHANGE UP (ref 32–36)
MCV RBC AUTO: 90.8 FL — SIGNIFICANT CHANGE UP (ref 80–100)
METHADONE UR-MCNC: NEGATIVE — SIGNIFICANT CHANGE UP
MONOCYTES # BLD AUTO: 0.69 K/UL — SIGNIFICANT CHANGE UP (ref 0–0.9)
MONOCYTES NFR BLD AUTO: 8 % — SIGNIFICANT CHANGE UP (ref 2–14)
NEUTROPHILS # BLD AUTO: 5.77 K/UL — SIGNIFICANT CHANGE UP (ref 1.8–7.4)
NEUTROPHILS NFR BLD AUTO: 66.5 % — SIGNIFICANT CHANGE UP (ref 43–77)
NONHDLC SERPL-MCNC: 79 MG/DL — SIGNIFICANT CHANGE UP
NRBC # BLD AUTO: 0 K/UL — SIGNIFICANT CHANGE UP (ref 0–0)
NRBC # FLD: 0 K/UL — SIGNIFICANT CHANGE UP (ref 0–0)
NRBC BLD AUTO-RTO: 0 /100 WBCS — SIGNIFICANT CHANGE UP (ref 0–0)
OPIATES UR-MCNC: NEGATIVE — SIGNIFICANT CHANGE UP
PCP SPEC-MCNC: SIGNIFICANT CHANGE UP
PCP UR-MCNC: NEGATIVE — SIGNIFICANT CHANGE UP
PHOSPHATE SERPL-MCNC: 3.4 MG/DL — SIGNIFICANT CHANGE UP (ref 2.4–4.7)
PLATELET # BLD AUTO: 189 K/UL — SIGNIFICANT CHANGE UP (ref 150–400)
PMV BLD: 10.3 FL — SIGNIFICANT CHANGE UP (ref 7–13)
POTASSIUM SERPL-MCNC: 4.5 MMOL/L — SIGNIFICANT CHANGE UP (ref 3.5–5.3)
POTASSIUM SERPL-SCNC: 4.5 MMOL/L — SIGNIFICANT CHANGE UP (ref 3.5–5.3)
PROLACTIN SERPL-MCNC: 15.2 NG/ML — SIGNIFICANT CHANGE UP (ref 4.1–18.4)
PROT SERPL-MCNC: 5.8 G/DL — LOW (ref 6.6–8.7)
RAPID RVP RESULT: SIGNIFICANT CHANGE UP
RBC # BLD: 5.21 M/UL — SIGNIFICANT CHANGE UP (ref 4.2–5.8)
RBC # FLD: 13.5 % — SIGNIFICANT CHANGE UP (ref 10.3–14.5)
SARS-COV-2 RNA SPEC QL NAA+PROBE: SIGNIFICANT CHANGE UP
SODIUM SERPL-SCNC: 140 MMOL/L — SIGNIFICANT CHANGE UP (ref 135–145)
T3 SERPL-MCNC: 82 NG/DL — SIGNIFICANT CHANGE UP (ref 80–200)
T4 FREE SERPL-MCNC: 0.9 NG/DL — SIGNIFICANT CHANGE UP (ref 0.9–1.8)
THC UR QL: NEGATIVE — SIGNIFICANT CHANGE UP
TRIGL SERPL-MCNC: 125 MG/DL — SIGNIFICANT CHANGE UP
TSH SERPL-MCNC: 1.9 UIU/ML — SIGNIFICANT CHANGE UP (ref 0.27–4.2)
WBC # BLD: 8.67 K/UL — SIGNIFICANT CHANGE UP (ref 3.8–10.5)
WBC # FLD AUTO: 8.67 K/UL — SIGNIFICANT CHANGE UP (ref 3.8–10.5)

## 2025-06-14 PROCEDURE — 70553 MRI BRAIN STEM W/O & W/DYE: CPT | Mod: 26

## 2025-06-14 PROCEDURE — 93306 TTE W/DOPPLER COMPLETE: CPT | Mod: 26

## 2025-06-14 PROCEDURE — 93010 ELECTROCARDIOGRAM REPORT: CPT

## 2025-06-14 PROCEDURE — 95718 EEG PHYS/QHP 2-12 HR W/VEEG: CPT

## 2025-06-14 PROCEDURE — 99233 SBSQ HOSP IP/OBS HIGH 50: CPT

## 2025-06-14 PROCEDURE — 99232 SBSQ HOSP IP/OBS MODERATE 35: CPT

## 2025-06-14 RX ORDER — MAGNESIUM SULFATE 500 MG/ML
1 SYRINGE (ML) INJECTION ONCE
Refills: 0 | Status: COMPLETED | OUTPATIENT
Start: 2025-06-14 | End: 2025-06-14

## 2025-06-14 RX ADMIN — ATORVASTATIN CALCIUM 40 MILLIGRAM(S): 80 TABLET, FILM COATED ORAL at 22:17

## 2025-06-14 RX ADMIN — Medication 125 MILLILITER(S): at 05:17

## 2025-06-14 RX ADMIN — LATANOPROST PF 1 DROP(S): 0.05 SOLUTION/ DROPS OPHTHALMIC at 22:17

## 2025-06-14 RX ADMIN — SACUBITRIL AND VALSARTAN 1 TABLET(S): 49; 51 TABLET, FILM COATED ORAL at 17:52

## 2025-06-14 RX ADMIN — ENOXAPARIN SODIUM 40 MILLIGRAM(S): 100 INJECTION SUBCUTANEOUS at 17:52

## 2025-06-14 RX ADMIN — CLOPIDOGREL BISULFATE 75 MILLIGRAM(S): 75 TABLET, FILM COATED ORAL at 11:56

## 2025-06-14 RX ADMIN — DORZOLAMIDE HYDROCHLORIDE AND TIMOLOL MALEATE 1 DROP(S): 20; 5 SOLUTION/ DROPS OPHTHALMIC at 17:23

## 2025-06-14 RX ADMIN — Medication 100 GRAM(S): at 08:50

## 2025-06-14 RX ADMIN — Medication 125 MILLILITER(S): at 17:24

## 2025-06-14 RX ADMIN — DORZOLAMIDE HYDROCHLORIDE AND TIMOLOL MALEATE 1 DROP(S): 20; 5 SOLUTION/ DROPS OPHTHALMIC at 05:16

## 2025-06-14 NOTE — PROGRESS NOTE ADULT - PROBLEM SELECTOR PLAN 1
-he denies actual syncope, seen by Neurology pending suspect TIA pending repeat MRI  -continue telemetry monitoring, if neurological workup negative will plan for left heart cath next week to complete ischemic workup  -repeat TTE pending if further drop in LV EF with recurrent NSVT and LBBB may benefit from CRT-D otherwise can consider ILR prior to discharge  -continue low dose metoprolol limited by sinus bradycardia  -change valsartan to Entresto for GDMT    * * * INCOMPLETE NOTE * * * -he denies actual syncope, seen by Neurology pending suspect TIA pending repeat MRI  -continue telemetry monitoring  -repeat TTE pending if further drop in LV EF with recurrent NSVT and LBBB may benefit from CRT-D otherwise can consider ILR prior to discharge  -continue low dose metoprolol limited by sinus bradycardia  -change valsartan to Entresto for GDMT    syncope likely non cardiac -he denies actual syncope, seen by Neurology pending suspect TIA pending repeat MRI  -continue telemetry monitoring  - neurology workup in progress  - echo is pending, if normal we will sign off.

## 2025-06-14 NOTE — PROGRESS NOTE ADULT - ASSESSMENT
69M h/o HTN, HLD, LBBB, CAD with cath in 2023 stent to ramus, had Echo done in 12/2024 with progressive drop in LV EF 44% and moderate AS, recent dizziness had MCOT placed found with 12 beats NSVT started on metoprolol pending elective outpatient left heart cath and MRI found with 1.5 cm pituitary macroadenoma, reports sudden altered mental state yesterday with speech impairment and also few weeks ago with blurry vision while driving, has not seen outpatient Neurology or Endocrine yet, CT negative for infarct/bleed, admitted for further workup   69M h/o HTN, HLD, LBBB, CAD with cath in 2023 stent to ramus, had Echo done in 12/2024 with progressive drop in LV EF 44% and moderate AS, recent dizziness had MCOT placed found with 12 beats NSVT started on metoprolol pending elective outpatient left heart cath and MRI found with 1.5 cm pituitary macroadenoma, reports sudden altered mental state yesterday with speech impairment and also few weeks ago with blurry vision while driving, has not seen outpatient Neurology or Endocrine yet, CT negative for infarct/bleed, admitted for further workup

## 2025-06-14 NOTE — PROGRESS NOTE ADULT - SUBJECTIVE AND OBJECTIVE BOX
[facility]                                                         CARDIOLOGY AT [address], [place] [address]                                                         Telephone: [phone]. Fax:[phone]                                                                             PROGRESS NOTE    Reason for follow up: sign off     Review of symptoms:   Cardiac:  No chest pain. No dyspnea. No palpitations.  Respiratory: no cough. No dyspnea  Gastrointestinal: No diarrhea. No abdominal pain. No bleeding.   Neuro: No focal neuro complaints.  All other ROS negative unless otherwise listed above    PHYSICAL EXAM:  Appearance: Comfortable. No acute distress  HEENT:  Atraumatic. Normocephalic.  Normal oral mucosa  Neurologic: A & O x 3, no gross focal deficits.  Cardiovascular: RRR S1 S2, No murmur, no rubs/gallops. No JVD  Respiratory: Lungs clear to auscultation, unlabored   Gastrointestinal:  Soft, Non-tender, + BS  Lower Extremities: 2+ Peripheral Pulses, No clubbing, cyanosis, or edema  Psychiatry: Patient is calm. No agitation.   Skin: warm and dry.    Vitals:  T(C): 36.5 (06-14-25 @ 07:51), Max: 36.7 (06-13-25 @ 11:05)  HR: 57 (06-14-25 @ 07:51) (46 - 57)  BP: 138/77 (06-14-25 @ 07:51) (138/77 - 157/76)  RR: 18 (06-14-25 @ 07:51) (14 - 18)  SpO2: 97% (06-14-25 @ 07:51) (97% - 100%)  Wt(kg): --  I&O's Summary    14 Jun 2025 07:01  -  14 Jun 2025 08:49  --------------------------------------------------------  IN: 0 mL / OUT: 300 mL / NET: -300 mL      Weight (kg): 100.1 (06-12 @ 19:58)    CURRENT CARDIAC MEDICATIONS:  metoprolol succinate ER 12.5 milliGRAM(s) Oral daily  sacubitril 24 mG/valsartan 26 mG 1 Tablet(s) Oral two times a day      CURRENT OTHER MEDICATIONS:  acetaminophen     Tablet .. 650 milliGRAM(s) Oral every 6 hours PRN Temp greater or equal to 38C (100.4F), Mild Pain (1 - 3)  melatonin 3 milliGRAM(s) Oral at bedtime PRN Insomnia  ondansetron Injectable 4 milliGRAM(s) IV Push every 8 hours PRN Nausea and/or Vomiting  aluminum hydroxide/magnesium hydroxide/simethicone Suspension 30 milliLiter(s) Oral every 4 hours PRN Dyspepsia  atorvastatin 40 milliGRAM(s) Oral at bedtime  dextrose 50% Injectable 25 Gram(s) IV Push once, Stop order after: 1 Doses  dextrose 50% Injectable 12.5 Gram(s) IV Push once, Stop order after: 1 Doses  dextrose 50% Injectable 25 Gram(s) IV Push once, Stop order after: 1 Doses  dextrose Oral Gel 15 Gram(s) Oral once, Stop order after: 1 Doses PRN Blood Glucose LESS THAN 70 milliGRAM(s)/deciliter  glucagon  Injectable 1 milliGRAM(s) IntraMuscular once, Stop order after: 1 Doses  insulin lispro (ADMELOG) corrective regimen sliding scale   SubCutaneous three times a day before meals  insulin lispro (ADMELOG) corrective regimen sliding scale   SubCutaneous at bedtime  clopidogrel Tablet 75 milliGRAM(s) Oral daily  dextrose 5%. 1000 milliLiter(s) (50 mL/Hr) IV Continuous <Continuous>  dextrose 5%. 1000 milliLiter(s) (100 mL/Hr) IV Continuous <Continuous>  dorzolamide 2%/timolol 0.5% Ophthalmic Solution 1 Drop(s) Both EYES two times a day  enoxaparin Injectable 40 milliGRAM(s) SubCutaneous every 24 hours  latanoprost 0.005% Ophthalmic Solution 1 Drop(s) Both EYES at bedtime  magnesium sulfate  IVPB 1 Gram(s) IV Intermittent once, Stop order after: 1 Doses  sodium chloride 0.9%. 1000 milliLiter(s) (125 mL/Hr) IV Continuous <Continuous>      LABS:	 	                            16.3   8.67  )-----------( 189      ( 14 Jun 2025 02:46 )             47.3     06-14    140  |  106  |  18.2  ----------------------------<  86  4.5   |  23.0  |  0.82    Ca    8.6      14 Jun 2025 02:46  Phos  3.4     06-14  Mg     1.9     06-14    TPro  5.8[L]  /  Alb  3.7  /  TBili  0.7  /  DBili  0.1  /  AST  21  /  ALT  13  /  AlkPhos  68  06-14    PT/INR/PTT ( 12 Jun 2025 21:56 )                       :                       :      11.2         :       29.3                  .        .                   .              .           .       0.99        .                                       Lipid Profile: Date: 06-14 @ 02:46  Total cholesterol 108; Direct LDL: --; HDL: 29; Triglycerides:125    HgA1c:   TSH: Thyroid Stimulating Hormone, Serum: 1.90 uIU/mL

## 2025-06-14 NOTE — PROGRESS NOTE ADULT - SUBJECTIVE AND OBJECTIVE BOX
[facility]                                        Neurology at [place]                                 Cindy Butler, & Mumtaz                                  [address]                                        [place], [place], [address]                                             [phone]        CC: Syncope and altered mental status     HPI:   The patient is a 69y Male who has had several episodes of syncope. He has been seen by cardiology and by Dr Mccarty of [facility] (now [facility]).  Brain MRI showed incidental pituitary adenoma.   He now presents with episode of severe confusion and visual obscuration. He stated it felt like vision was closing in from the periphery. Now resolved although he states he still feels "fuzzy."    Interim history:  Remains in ER awaiting bed.   Now states feeling back to normal.    ROS:   Denies headache or dizziness.  Denies chest pain.  Denies shortness of breath.    MEDICATIONS  (STANDING):  atorvastatin 40 milliGRAM(s) Oral at bedtime  clopidogrel Tablet 75 milliGRAM(s) Oral daily  dextrose 5%. 1000 milliLiter(s) (50 mL/Hr) IV Continuous <Continuous>  dextrose 5%. 1000 milliLiter(s) (100 mL/Hr) IV Continuous <Continuous>  dextrose 50% Injectable 25 Gram(s) IV Push once  dextrose 50% Injectable 12.5 Gram(s) IV Push once  dextrose 50% Injectable 25 Gram(s) IV Push once  dorzolamide 2%/timolol 0.5% Ophthalmic Solution 1 Drop(s) Both EYES two times a day  enoxaparin Injectable 40 milliGRAM(s) SubCutaneous every 24 hours  glucagon  Injectable 1 milliGRAM(s) IntraMuscular once  insulin lispro (ADMELOG) corrective regimen sliding scale   SubCutaneous three times a day before meals  insulin lispro (ADMELOG) corrective regimen sliding scale   SubCutaneous at bedtime  latanoprost 0.005% Ophthalmic Solution 1 Drop(s) Both EYES at bedtime  metoprolol succinate ER 12.5 milliGRAM(s) Oral daily  sacubitril 24 mG/valsartan 26 mG 1 Tablet(s) Oral two times a day  sodium chloride 0.9%. 1000 milliLiter(s) (125 mL/Hr) IV Continuous <Continuous>    Vital Signs Last 24 Hrs  T(C): 36.5 (14 Jun 2025 07:51), Max: 36.7 (13 Jun 2025 11:05)  T(F): 97.7 (14 Jun 2025 07:51), Max: 98.1 (13 Jun 2025 20:19)  HR: 57 (14 Jun 2025 07:51) (46 - 57)  BP: 138/77 (14 Jun 2025 07:51) (138/77 - 157/76)  BP(mean): 97 (14 Jun 2025 07:51) (95 - 103)  RR: 18 (14 Jun 2025 07:51) (14 - 18)  SpO2: 97% (14 Jun 2025 07:51) (97% - 100%)    Parameters below as of 14 Jun 2025 07:51  Patient On (Oxygen Delivery Method): room air    Detailed Neurologic Exam:    Mental status: The patient is awake and alert. There is no aphasia. There is no dysarthria.     Cranial nerves: Pupils equal and react symmetrically to light. There is no visual field deficit to threat. Extraocular motion is full with no nystagmus. Facial sensation is intact. Facial musculature is symmetric. Palate elevates symmetrically. Tongue is midline.    Motor: There is normal bulk and tone.  There is no tremor.  Strength grossly 5/5 bilaterally.    Sensation: Grossly intact to light touch and pin.    Reflexes: 2+ throughout and plantar responses are flexor.    Cerebellar: No dysmetria on finger nose testing.    Labs:     06-14    140  |  106  |  18.2  ----------------------------<  86  4.5   |  23.0  |  0.82    Ca    8.6      14 Jun 2025 02:46  Phos  3.4     06-14  Mg     1.9     06-14    TPro  5.8[L]  /  Alb  3.7  /  TBili  0.7  /  DBili  0.1  /  AST  21  /  ALT  13  /  AlkPhos  68  06-14                            16.3   8.67  )-----------( 189      ( 14 Jun 2025 02:46 )             47.3       EEG (preliminary): normal.

## 2025-06-14 NOTE — EEG REPORT - NS EEG TEXT BOX
KAMLESH NOE [id number]     Study Date: 		06-14-25 ([id number])   Duration: 4h 14min   --------------------------------------------------------------------------------------------------  History:  CC/ HPI Patient is a 69y old  Male who presents with a chief complaint of     MEDICATIONS  (STANDING):  --------------------------------------------------------------------------------------------------  Study Interpretation:    [[[Abbreviation Key:  PDR=alpha rhythm/posterior dominant rhythm. A-P=anterior posterior gradient.  Amplitude: ‘very low’:<20; ‘low’:20-50; ‘medium’:; ‘high’:>200uV.  Persistence for periodic/rhythmic patterns (% of epoch) ‘rare’:<1%; ‘occasional’:1-10%; ‘frequent’:10-50%; ‘abundant’:50-90%; ‘continuous’:>90%.  Persistence for sporadic discharges: ‘rare’:<1/hr; ‘occasional’:1/min-1/hr; ‘frequent’:>1/min; ‘abundant’:>1/10 sec.  GRDA=generalized rhythmic delta activity; FIRDA=frontal intermittent GRDA; LRDA=lateralized rhythmic delta activity; TIRDA=temporal intermittent rhythmic delta activity;  LPD=PLED=lateralized periodic discharges; GPD=generalized periodic discharges; BiPDs=BiPLEDs=bilateral independent periodic epileptiform discharges; SIRPID=stimulus induced rhythmic, periodic, or ictal appearing discharges; BIRDs=brief potentially ictal rhythmic discharges >4 Hz, lasting .5-10s; PFA=paroxysmal bursts of beta/gamma; LVFA=low voltage fast activity.  Modifiers: +F=with fast component; +S=with spike component; +R=with rhythmic component.  S-B=burst suppression pattern.  Max=maximal. N1-drowsy; N2-stage II sleep; N3-slow wave sleep. SSS/BETS=small sharp spikes/benign epileptiform transients of sleep. HV=hyperventilation; PS=photic stimulation]]]    Daily EEG Visual Analysis    FINDINGS:      Background:  Continuity: continuous  Symmetry: symmetric  PDR: 9-9.5 Hz activity, with amplitude to 40 uV, that attenuated to eye opening.  Low amplitude frontal beta noted in wakefulness.  Reactivity: present  Voltage: normal, mostly 20-150uV  Anterior Posterior Gradient: present  Other background findings: none  Breach: absent    Background Slowing:  Generalized slowing: none was present.  Focal slowing: none was present.    State Changes:   -Drowsiness noted with increased slowing, attenuation of fast activity, vertex transients.  -Present with N2 sleep transients with symmetric spindles and K-complexes.    Sporadic Epileptiform Discharges:    None    Rhythmic and Periodic Patterns (RPPs):  None     Electrographic and Electroclinical seizures:  None    Other Clinical Events:  None    Activation Procedures:   -Hyperventilation was not performed.    -Photic stimulation was not performed.    Artifacts:  Intermittent myogenic and movement artifacts were noted.    ECG:  The heart rate on single channel ECG was predominantly between 50-60 BPM.    EEG Classification / Summary:  Normal  EEG in the awake / drowsy / asleep state(s).    Clinical Impression:  There were no epileptiform abnormalities recorded.    Intermittent sinus bradycardia.     *PRELIM READ, ATTENDING REVIEW PENDING*       -------------------------------------------------------------------------------------------------------  [facility] EEG Reading Room Ph#: [phone]  Epilepsy Answering Service after 5PM and before 8:30AM: Ph#: [phone]    Khoa Baca DO   Epilepsy Fellow

## 2025-06-14 NOTE — PROGRESS NOTE ADULT - ASSESSMENT
69y/oM PMH CAD, HTN, HLD, DM, intermittent LBBB, pituitary macroadenoma, current smoker presenting to ER with confusion. Pt also with recent episodes near-syncope with blurry and "closing in" vision, which resolved after a couple minutes, has MCOT in place. Pt admitted for further work up     AMS   Near syncope   r/o cardiac, neuro event   -admit to tele   -CTH: No ICH, mass effect or infarct, +mild chronic ischemic changes in white matter ; +unchanged appearance of pituitary macroadenoma with suprasellar extension abutting optic chiasm ; +intracranial carotid arteries   -CTA Head/neck: no evidence significant stenosis or occlusion, no LVO, significant stenosis or vascular abnormality   -CT perfusion not post-processed, could not be evaluated   -recent MRI head w/wo contrast: findings compatible w/pituitary macroadenoma with mild mass effect on optic chiasm, no signal abnormality in optic chiasm; mild-mod chronic microvascular ischemic disease, mild diffuse cerebral volume loss  -f/u orthostatics   -trops flat  -cardio consult appreciated   -neuro consult appreciated  -TTE performed, pending read   -f/u MCOT interrogation   -f/u repeat MRI   -EEG neg  -poss C next week if neuro w/u neg     CAD s/p PCI   HTN, HLD  -monitor on tele   -cont home meds: metoprolol succinate 12.5mg qd, atorvastatin 40mg qd, valsartan-hctz 40/12.5mg (0.5 tab qd), plavix 75mg qd  -pt reports only taking plavix currently, no aspirin     DM   -hold home Jardiance  -HbA1c 5.8  -ISS     Glaucoma   -cont eye gtts     Tobacco dependence   -cessation advised   -declines NRT     vte ppx: lovenox sq     pt's daughter (who is a PA) updated at bedside

## 2025-06-14 NOTE — PROGRESS NOTE ADULT - SUBJECTIVE AND OBJECTIVE BOX
KAMLESH NOE    [id number]    69y      Male    CC:     INTERVAL HPI/OVERNIGHT EVENTS:    REVIEW OF SYSTEMS:    CONSTITUTIONAL: No fever, weight loss, or fatigue  RESPIRATORY: No cough, wheezing, hemoptysis; No shortness of breath  CARDIOVASCULAR: No chest pain, palpitations  GASTROINTESTINAL: No abdominal or epigastric pain. No nausea, vomiting  NEUROLOGICAL: No headaches, memory loss, loss of strength.    Vital Signs Last 24 Hrs  T(C): 36.6 (14 Jun 2025 11:48), Max: 36.7 (13 Jun 2025 18:38)  T(F): 97.8 (14 Jun 2025 11:48), Max: 98.1 (13 Jun 2025 20:19)  HR: 51 (14 Jun 2025 11:48) (46 - 57)  BP: 147/78 (14 Jun 2025 11:48) (138/77 - 157/76)  BP(mean): 101 (14 Jun 2025 11:48) (95 - 103)  RR: 18 (14 Jun 2025 11:48) (16 - 18)  SpO2: 95% (14 Jun 2025 11:48) (95% - 100%)    Parameters below as of 14 Jun 2025 11:48  Patient On (Oxygen Delivery Method): room air        PHYSICAL EXAM:    GENERAL: NAD  HEENT: PERRL, +EOMI  NECK: soft, supple  CHEST/LUNG: Clear to auscultation bilaterally; No wheezing  HEART: S1S2+, Regular rate and rhythm; No murmurs, rubs, or gallops  ABDOMEN: Soft, Nontender, Nondistended; Bowel sounds present  SKIN: No rashes or lesions  NEURO: AAOX3, no focal deficits, no motor or sensory loss  PSYCH: normal mood    LABS:                        16.3   8.67  )-----------( 189      ( 14 Jun 2025 02:46 )             47.3     06-14    140  |  106  |  18.2  ----------------------------<  86  4.5   |  23.0  |  0.82    Ca    8.6      14 Jun 2025 02:46  Phos  3.4     06-14  Mg     1.9     06-14    TPro  5.8[L]  /  Alb  3.7  /  TBili  0.7  /  DBili  0.1  /  AST  21  /  ALT  13  /  AlkPhos  68  06-14    PT/INR - ( 12 Jun 2025 21:56 )   PT: 11.2 sec;   INR: 0.99 ratio         PTT - ( 12 Jun 2025 21:56 )  PTT:29.3 sec  Urinalysis Basic - ( 14 Jun 2025 02:46 )    Color: x / Appearance: x / SG: x / pH: x  Gluc: 86 mg/dL / Ketone: x  / Bili: x / Urobili: x   Blood: x / Protein: x / Nitrite: x   Leuk Esterase: x / RBC: x / WBC x   Sq Epi: x / Non Sq Epi: x / Bacteria: x          MEDICATIONS  (STANDING):  atorvastatin 40 milliGRAM(s) Oral at bedtime  clopidogrel Tablet 75 milliGRAM(s) Oral daily  dextrose 5%. 1000 milliLiter(s) (50 mL/Hr) IV Continuous <Continuous>  dextrose 5%. 1000 milliLiter(s) (100 mL/Hr) IV Continuous <Continuous>  dextrose 50% Injectable 25 Gram(s) IV Push once  dextrose 50% Injectable 12.5 Gram(s) IV Push once  dextrose 50% Injectable 25 Gram(s) IV Push once  dorzolamide 2%/timolol 0.5% Ophthalmic Solution 1 Drop(s) Both EYES two times a day  enoxaparin Injectable 40 milliGRAM(s) SubCutaneous every 24 hours  glucagon  Injectable 1 milliGRAM(s) IntraMuscular once  insulin lispro (ADMELOG) corrective regimen sliding scale   SubCutaneous three times a day before meals  insulin lispro (ADMELOG) corrective regimen sliding scale   SubCutaneous at bedtime  latanoprost 0.005% Ophthalmic Solution 1 Drop(s) Both EYES at bedtime  metoprolol succinate ER 12.5 milliGRAM(s) Oral daily  sacubitril 24 mG/valsartan 26 mG 1 Tablet(s) Oral two times a day  sodium chloride 0.9%. 1000 milliLiter(s) (125 mL/Hr) IV Continuous <Continuous>    MEDICATIONS  (PRN):  acetaminophen     Tablet .. 650 milliGRAM(s) Oral every 6 hours PRN Temp greater or equal to 38C (100.4F), Mild Pain (1 - 3)  aluminum hydroxide/magnesium hydroxide/simethicone Suspension 30 milliLiter(s) Oral every 4 hours PRN Dyspepsia  dextrose Oral Gel 15 Gram(s) Oral once PRN Blood Glucose LESS THAN 70 milliGRAM(s)/deciliter  melatonin 3 milliGRAM(s) Oral at bedtime PRN Insomnia  ondansetron Injectable 4 milliGRAM(s) IV Push every 8 hours PRN Nausea and/or Vomiting      RADIOLOGY & ADDITIONAL TESTS:   KAMLESH NOE    [id number]    69y      Male    CC: confusion     INTERVAL HPI/OVERNIGHT EVENTS: pt seen and examined on EEG. no new complaints. +reported few beats  NSVT on monitor o/n and this am     REVIEW OF SYSTEMS:    CONSTITUTIONAL: No fever, weight loss  RESPIRATORY: No cough, wheezing, hemoptysis; No shortness of breath  CARDIOVASCULAR: No chest pain, palpitations  GASTROINTESTINAL: No abdominal or epigastric pain. No nausea, vomiting    Vital Signs Last 24 Hrs  T(C): 36.6 (14 Jun 2025 11:48), Max: 36.7 (13 Jun 2025 18:38)  T(F): 97.8 (14 Jun 2025 11:48), Max: 98.1 (13 Jun 2025 20:19)  HR: 51 (14 Jun 2025 11:48) (46 - 57)  BP: 147/78 (14 Jun 2025 11:48) (138/77 - 157/76)  BP(mean): 101 (14 Jun 2025 11:48) (95 - 103)  RR: 18 (14 Jun 2025 11:48) (16 - 18)  SpO2: 95% (14 Jun 2025 11:48) (95% - 100%)    Parameters below as of 14 Jun 2025 11:48  Patient On (Oxygen Delivery Method): room air        PHYSICAL EXAM:    CONSTITUTIONAL: NAD  ENMT: moist mucous membranes  EYES: +EOMI  CARDIAC: Regular rate, regular rhythm.  normal +S1, S2  RESPIRATORY: Clear to auscultation bilaterally  GASTROINTESTINAL: Abdomen soft, non-tender, no guarding  NEUROLOGICAL: Alert and oriented, poor attention; poor short-term recall   SKIN: warm, dry    LABS:                        16.3   8.67  )-----------( 189      ( 14 Jun 2025 02:46 )             47.3     06-14    140  |  106  |  18.2  ----------------------------<  86  4.5   |  23.0  |  0.82    Ca    8.6      14 Jun 2025 02:46  Phos  3.4     06-14  Mg     1.9     06-14    TPro  5.8[L]  /  Alb  3.7  /  TBili  0.7  /  DBili  0.1  /  AST  21  /  ALT  13  /  AlkPhos  68  06-14    PT/INR - ( 12 Jun 2025 21:56 )   PT: 11.2 sec;   INR: 0.99 ratio         PTT - ( 12 Jun 2025 21:56 )  PTT:29.3 sec  Urinalysis Basic - ( 14 Jun 2025 02:46 )    Color: x / Appearance: x / SG: x / pH: x  Gluc: 86 mg/dL / Ketone: x  / Bili: x / Urobili: x   Blood: x / Protein: x / Nitrite: x   Leuk Esterase: x / RBC: x / WBC x   Sq Epi: x / Non Sq Epi: x / Bacteria: x          MEDICATIONS  (STANDING):  atorvastatin 40 milliGRAM(s) Oral at bedtime  clopidogrel Tablet 75 milliGRAM(s) Oral daily  dextrose 5%. 1000 milliLiter(s) (50 mL/Hr) IV Continuous <Continuous>  dextrose 5%. 1000 milliLiter(s) (100 mL/Hr) IV Continuous <Continuous>  dextrose 50% Injectable 25 Gram(s) IV Push once  dextrose 50% Injectable 12.5 Gram(s) IV Push once  dextrose 50% Injectable 25 Gram(s) IV Push once  dorzolamide 2%/timolol 0.5% Ophthalmic Solution 1 Drop(s) Both EYES two times a day  enoxaparin Injectable 40 milliGRAM(s) SubCutaneous every 24 hours  glucagon  Injectable 1 milliGRAM(s) IntraMuscular once  insulin lispro (ADMELOG) corrective regimen sliding scale   SubCutaneous three times a day before meals  insulin lispro (ADMELOG) corrective regimen sliding scale   SubCutaneous at bedtime  latanoprost 0.005% Ophthalmic Solution 1 Drop(s) Both EYES at bedtime  metoprolol succinate ER 12.5 milliGRAM(s) Oral daily  sacubitril 24 mG/valsartan 26 mG 1 Tablet(s) Oral two times a day  sodium chloride 0.9%. 1000 milliLiter(s) (125 mL/Hr) IV Continuous <Continuous>    MEDICATIONS  (PRN):  acetaminophen     Tablet .. 650 milliGRAM(s) Oral every 6 hours PRN Temp greater or equal to 38C (100.4F), Mild Pain (1 - 3)  aluminum hydroxide/magnesium hydroxide/simethicone Suspension 30 milliLiter(s) Oral every 4 hours PRN Dyspepsia  dextrose Oral Gel 15 Gram(s) Oral once PRN Blood Glucose LESS THAN 70 milliGRAM(s)/deciliter  melatonin 3 milliGRAM(s) Oral at bedtime PRN Insomnia  ondansetron Injectable 4 milliGRAM(s) IV Push every 8 hours PRN Nausea and/or Vomiting      RADIOLOGY & ADDITIONAL TESTS:

## 2025-06-14 NOTE — PROGRESS NOTE ADULT - ASSESSMENT
69y Male with recurrent syncope and now with episode of altered mental status and vision changes.     Episode of altered mental status  Possible TIA.   Will repeat brain MRI to assess for new infarct.   Will add MRI sella to study as has pituitary lesion.    Syncope  EEG negative.  Cardiology following.

## 2025-06-15 LAB
ALBUMIN SERPL ELPH-MCNC: 3.5 G/DL — SIGNIFICANT CHANGE UP (ref 3.3–5.2)
ALP SERPL-CCNC: 77 U/L — SIGNIFICANT CHANGE UP (ref 40–120)
ALT FLD-CCNC: 12 U/L — SIGNIFICANT CHANGE UP
ANION GAP SERPL CALC-SCNC: 11 MMOL/L — SIGNIFICANT CHANGE UP (ref 5–17)
AST SERPL-CCNC: 20 U/L — SIGNIFICANT CHANGE UP
BILIRUB DIRECT SERPL-MCNC: 0.1 MG/DL — SIGNIFICANT CHANGE UP (ref 0–0.3)
BILIRUB INDIRECT FLD-MCNC: 0.4 MG/DL — SIGNIFICANT CHANGE UP (ref 0.2–1)
BILIRUB SERPL-MCNC: 0.5 MG/DL — SIGNIFICANT CHANGE UP (ref 0.4–2)
BUN SERPL-MCNC: 18.8 MG/DL — SIGNIFICANT CHANGE UP (ref 8–20)
CALCIUM SERPL-MCNC: 8.3 MG/DL — LOW (ref 8.4–10.5)
CHLORIDE SERPL-SCNC: 110 MMOL/L — HIGH (ref 96–108)
CO2 SERPL-SCNC: 21 MMOL/L — LOW (ref 22–29)
CREAT SERPL-MCNC: 0.73 MG/DL — SIGNIFICANT CHANGE UP (ref 0.5–1.3)
EGFR: 98 ML/MIN/1.73M2 — SIGNIFICANT CHANGE UP
EGFR: 98 ML/MIN/1.73M2 — SIGNIFICANT CHANGE UP
GLUCOSE BLDC GLUCOMTR-MCNC: 135 MG/DL — HIGH (ref 70–99)
GLUCOSE BLDC GLUCOMTR-MCNC: 93 MG/DL — SIGNIFICANT CHANGE UP (ref 70–99)
GLUCOSE BLDC GLUCOMTR-MCNC: 96 MG/DL — SIGNIFICANT CHANGE UP (ref 70–99)
GLUCOSE BLDC GLUCOMTR-MCNC: 99 MG/DL — SIGNIFICANT CHANGE UP (ref 70–99)
GLUCOSE SERPL-MCNC: 102 MG/DL — HIGH (ref 70–99)
HCT VFR BLD CALC: 46.2 % — SIGNIFICANT CHANGE UP (ref 39–50)
HGB BLD-MCNC: 16.3 G/DL — SIGNIFICANT CHANGE UP (ref 13–17)
MAGNESIUM SERPL-MCNC: 1.9 MG/DL — SIGNIFICANT CHANGE UP (ref 1.6–2.6)
MCHC RBC-ENTMCNC: 31.8 PG — SIGNIFICANT CHANGE UP (ref 27–34)
MCHC RBC-ENTMCNC: 35.3 G/DL — SIGNIFICANT CHANGE UP (ref 32–36)
MCV RBC AUTO: 90.1 FL — SIGNIFICANT CHANGE UP (ref 80–100)
NRBC # BLD AUTO: 0 K/UL — SIGNIFICANT CHANGE UP (ref 0–0)
NRBC # FLD: 0 K/UL — SIGNIFICANT CHANGE UP (ref 0–0)
NRBC BLD AUTO-RTO: 0 /100 WBCS — SIGNIFICANT CHANGE UP (ref 0–0)
PHOSPHATE SERPL-MCNC: 3.1 MG/DL — SIGNIFICANT CHANGE UP (ref 2.4–4.7)
PLATELET # BLD AUTO: 180 K/UL — SIGNIFICANT CHANGE UP (ref 150–400)
PMV BLD: 10.4 FL — SIGNIFICANT CHANGE UP (ref 7–13)
POTASSIUM SERPL-MCNC: 3.8 MMOL/L — SIGNIFICANT CHANGE UP (ref 3.5–5.3)
POTASSIUM SERPL-SCNC: 3.8 MMOL/L — SIGNIFICANT CHANGE UP (ref 3.5–5.3)
PROT SERPL-MCNC: 5.7 G/DL — LOW (ref 6.6–8.7)
RBC # BLD: 5.13 M/UL — SIGNIFICANT CHANGE UP (ref 4.2–5.8)
RBC # FLD: 13.3 % — SIGNIFICANT CHANGE UP (ref 10.3–14.5)
SODIUM SERPL-SCNC: 142 MMOL/L — SIGNIFICANT CHANGE UP (ref 135–145)
WBC # BLD: 8.86 K/UL — SIGNIFICANT CHANGE UP (ref 3.8–10.5)
WBC # FLD AUTO: 8.86 K/UL — SIGNIFICANT CHANGE UP (ref 3.8–10.5)

## 2025-06-15 PROCEDURE — 99233 SBSQ HOSP IP/OBS HIGH 50: CPT

## 2025-06-15 PROCEDURE — 93010 ELECTROCARDIOGRAM REPORT: CPT

## 2025-06-15 RX ADMIN — LATANOPROST PF 1 DROP(S): 0.05 SOLUTION/ DROPS OPHTHALMIC at 22:20

## 2025-06-15 RX ADMIN — Medication 20 MILLIEQUIVALENT(S): at 22:19

## 2025-06-15 RX ADMIN — DORZOLAMIDE HYDROCHLORIDE AND TIMOLOL MALEATE 1 DROP(S): 20; 5 SOLUTION/ DROPS OPHTHALMIC at 17:10

## 2025-06-15 RX ADMIN — DORZOLAMIDE HYDROCHLORIDE AND TIMOLOL MALEATE 1 DROP(S): 20; 5 SOLUTION/ DROPS OPHTHALMIC at 04:53

## 2025-06-15 RX ADMIN — CLOPIDOGREL BISULFATE 75 MILLIGRAM(S): 75 TABLET, FILM COATED ORAL at 12:06

## 2025-06-15 RX ADMIN — Medication 70 MILLILITER(S): at 22:20

## 2025-06-15 RX ADMIN — ATORVASTATIN CALCIUM 40 MILLIGRAM(S): 80 TABLET, FILM COATED ORAL at 22:20

## 2025-06-15 RX ADMIN — Medication 125 MILLILITER(S): at 02:22

## 2025-06-15 NOTE — PROGRESS NOTE ADULT - SUBJECTIVE AND OBJECTIVE BOX
[facility]                                        Ne[facility] at [place]                                 Cindy Butler, & Mumtaz                                  [address]                                        [place], [place], [address]                                             [phone]        CC: Syncope and altered mental status     HPI:   The patient is a 69y Male who has had several episodes of syncope. He has been seen by cardiology and by Dr Mccarty of [facility] (now [facility]).  Brain MRI showed incidental pituitary adenoma.   He now presents with episode of severe confusion and visual obscuration. He stated it felt like vision was closing in from the periphery. Now resolved although he states he still feels "fuzzy."    Interim history:  Remains in ER awaiting bed.   Now states feeling back to normal.    ROS:   Denies headache or dizziness.  Denies chest pain.  Denies shortness of breath.    MEDICATIONS  (STANDING):  atorvastatin 40 milliGRAM(s) Oral at bedtime  clopidogrel Tablet 75 milliGRAM(s) Oral daily  dextrose 5%. 1000 milliLiter(s) (50 mL/Hr) IV Continuous <Continuous>  dextrose 5%. 1000 milliLiter(s) (100 mL/Hr) IV Continuous <Continuous>  dextrose 50% Injectable 25 Gram(s) IV Push once  dextrose 50% Injectable 12.5 Gram(s) IV Push once  dextrose 50% Injectable 25 Gram(s) IV Push once  dorzolamide 2%/timolol 0.5% Ophthalmic Solution 1 Drop(s) Both EYES two times a day  enoxaparin Injectable 40 milliGRAM(s) SubCutaneous every 24 hours  glucagon  Injectable 1 milliGRAM(s) IntraMuscular once  insulin lispro (ADMELOG) corrective regimen sliding scale   SubCutaneous three times a day before meals  insulin lispro (ADMELOG) corrective regimen sliding scale   SubCutaneous at bedtime  latanoprost 0.005% Ophthalmic Solution 1 Drop(s) Both EYES at bedtime  metoprolol succinate ER 12.5 milliGRAM(s) Oral daily  sacubitril 24 mG/valsartan 26 mG 1 Tablet(s) Oral two times a day  sodium chloride 0.9%. 1000 milliLiter(s) (125 mL/Hr) IV Continuous <Continuous>    Vital Signs Last 24 Hrs  T(C): 36.5 (15 Kailash 2025 09:37), Max: 36.8 (14 Jun 2025 17:36)  T(F): 97.7 (15 Kailash 2025 09:37), Max: 98.3 (14 Jun 2025 17:36)  HR: 55 (15 Kailash 2025 09:37) (46 - 55)  BP: 150/78 (15 Kailash 2025 09:37) (140/78 - 172/75)  BP(mean): 107 (15 Kailash 2025 04:05) (101 - 107)  RR: 16 (15 Kailash 2025 09:37) (16 - 18)  SpO2: 97% (15 Kailash 2025 09:37) (95% - 99%)    Parameters below as of 15 Kailash 2025 09:37  Patient On (Oxygen Delivery Method): room air    Detailed Neurologic Exam:    Mental status: The patient is awake and alert. There is no aphasia. There is no dysarthria.     Cranial nerves: Pupils equal and react symmetrically to light. There is no visual field deficit to threat. Extraocular motion is full with no nystagmus. Facial sensation is intact. Facial musculature is symmetric. Palate elevates symmetrically. Tongue is midline.    Motor: There is normal bulk and tone.  There is no tremor.  Strength grossly 5/5 bilaterally.    Sensation: Grossly intact to light touch and pin.    Reflexes: 2+ throughout and plantar responses are flexor.    Cerebellar: No dysmetria on finger nose testing.    Labs:     06-15    142  |  110[H]  |  18.8  ----------------------------<  102[H]  3.8   |  21.0[L]  |  0.73    Ca    8.3[L]      15 Kailash 2025 05:36  Phos  3.1     06-15  Mg     1.9     06-15    TPro  5.7[L]  /  Alb  3.5  /  TBili  0.5  /  DBili  0.1  /  AST  20  /  ALT  12  /  AlkPhos  77  06-15                            16.3   8.86  )-----------( 180      ( 15 Kailash 2025 05:36 )             46.2       Rad:   MRI brain images reviewed (and concur with report) and compared to prior study 6/10/25.: There is no acute pathology.   Unchanged sellar/suprasellar lesion with internal proteinaceous or hemorrhagic contents.   Unchanged white matter ischemic change.

## 2025-06-15 NOTE — CHART NOTE - NSCHARTNOTEFT_GEN_A_CORE
< from: TTE W or WO Ultrasound Enhancing Agent (06.14.25 @ 14:01) >     1. Left ventricular systolic function is normalwith an ejection fraction of 59 % by Alexander's method of disks with an ejection fraction visually estimated at 55 to 60 %.   2. Normal right ventricular cavity size and normal wall thickness,.   3. Left atrium is moderately dilated.   4. The right atrium is normal in size.   5. There is calcification of the mitral valve annulus.   6. Moderate aortic stenosis.   7. The peak transaortic velocity is 3.04 m/s, peak transaortic gradient is 37.0 mmHg and mean transaortic gradient is 21.0 mmHg with an LVOT/aortic valve VTI ratio of 0.39. The effective orifice area is estimated at 1.24 cm² by the continuity equation and indexed at 0.54 cm²/m².   8. No pericardial effusion seen.   9. No prior echocardiogram is available for comparison.    < end of copied text >    - echo as above which is normal; we will sign off
PA NOTE-MEDICINE    Called by RN due to Pt experiencing 12 Beats V-Tach.  Pt asymptomatic VSS Electrolytes ordered. Pt on Toprol XL 12.5 mg po Daily.   Continue to Monitor Pt  Recall PA for any reoccurrence  Will sign out to AM Team to Follow
Called by RN to notify that patient bradycardic mostly to the 40s, occasionally to 30s, non-sustained.   Patient has had episodes of bradycardia, worst tonight.  Patient asymptomatic.  Vital Signs:  T(F): 98.2  HR: 42   BP: 136/63   RR: 17   SpO2: 97%    Parameters below as of 15 Kailash 2025 21:28  Patient On (Oxygen Delivery Method): room air    Stat EKG ordered which showed marked bradycardia @ 39bpm. LBBB which is not new compared to prior.   Atropine 0.5mg IVP ordered to keep at bedside for HR <30 sustaining for >5min or if patient becomes symptomatic.   RN to keep pacer pads at bedside as well.   Continue to monitor patient, notify PA of any changes in patient status.

## 2025-06-15 NOTE — PROGRESS NOTE ADULT - ASSESSMENT
69y Male with recurrent syncope and now with episode of altered mental status and vision changes.     Episode of altered mental status  Possible TIA.   MRI brain and sella stable pituitary lesion.   Differential diagnosis includes but is not limited to a macroadenoma or complex Rathke cleft cyst versus other lesion.    Syncope  EEG negative.  Cardiology following.    Discharge planning.   No further specific neurologic recommendations. Will be available as needed.     Case discussed with Dr THOMAS Yip.

## 2025-06-15 NOTE — PROGRESS NOTE ADULT - SUBJECTIVE AND OBJECTIVE BOX
< from: MR Head w/wo IV Cont (06.14.25 @ 17:04) >  IMPRESSION: Unchanged sellar/suprasellar lesion with internal   proteinaceous or hemorrhagic contents. Differential diagnosis includes   but is not limited to a macroadenoma or complex Rathke cleft cyst versus   other lesion.    Multiple unchanged nonspecific abnormal white matter foci of T2/FLAIR   prolongation statistically favoring microvascular type changes.    < end of copied text >  < from: TTE W or WO Ultrasound Enhancing Agent (06.14.25 @ 14:01) >     CONCLUSIONS:      1. Left ventricular systolic function is normalwith an ejection fraction of 59 % by Alexander's method of disks with an ejection fraction visually estimated at 55 to 60 %.   2. Normal right ventricular cavity size and normal wall thickness,.   3. Left atrium is moderately dilated.   4. The right atrium is normal in size.   5. There is calcification of the mitral valve annulus.   6. Moderate aortic stenosis.   7. The peak transaortic velocity is 3.04 m/s, peak transaortic gradient is 37.0 mmHg and mean transaortic gradient is 21.0 mmHg with an LVOT/aortic valve VTI ratio of 0.39. The effective orifice area is estimated at 1.24 cm² by the continuity equation and indexed at 0.54 cm²/m².   8. No pericardial effusion seen.   9. No prior echocardiogram is available for comparison.    < end of copied text >    Clinical Impression:  There were no epileptiform abnormalities recorded.    Intermittent sinus bradycardia.  Patient is a 69y old  Male who presents with a chief complaint of     pt denies headache,dizziness,cp,sob   REVIEW OF SYSTEMS: All systems are reviewed and found to be negative except above    MEDICATIONS  (STANDING):  atorvastatin 40 milliGRAM(s) Oral at bedtime  clopidogrel Tablet 75 milliGRAM(s) Oral daily  dextrose 5%. 1000 milliLiter(s) (50 mL/Hr) IV Continuous <Continuous>  dextrose 5%. 1000 milliLiter(s) (100 mL/Hr) IV Continuous <Continuous>  dextrose 50% Injectable 25 Gram(s) IV Push once  dextrose 50% Injectable 12.5 Gram(s) IV Push once  dextrose 50% Injectable 25 Gram(s) IV Push once  dorzolamide 2%/timolol 0.5% Ophthalmic Solution 1 Drop(s) Both EYES two times a day  enoxaparin Injectable 40 milliGRAM(s) SubCutaneous every 24 hours  glucagon  Injectable 1 milliGRAM(s) IntraMuscular once  insulin lispro (ADMELOG) corrective regimen sliding scale   SubCutaneous three times a day before meals  insulin lispro (ADMELOG) corrective regimen sliding scale   SubCutaneous at bedtime  latanoprost 0.005% Ophthalmic Solution 1 Drop(s) Both EYES at bedtime  metoprolol succinate ER 12.5 milliGRAM(s) Oral daily  sacubitril 24 mG/valsartan 26 mG 1 Tablet(s) Oral two times a day  sodium chloride 0.9%. 1000 milliLiter(s) (125 mL/Hr) IV Continuous <Continuous>    MEDICATIONS  (PRN):  acetaminophen     Tablet .. 650 milliGRAM(s) Oral every 6 hours PRN Temp greater or equal to 38C (100.4F), Mild Pain (1 - 3)  aluminum hydroxide/magnesium hydroxide/simethicone Suspension 30 milliLiter(s) Oral every 4 hours PRN Dyspepsia  dextrose Oral Gel 15 Gram(s) Oral once PRN Blood Glucose LESS THAN 70 milliGRAM(s)/deciliter  melatonin 3 milliGRAM(s) Oral at bedtime PRN Insomnia  ondansetron Injectable 4 milliGRAM(s) IV Push every 8 hours PRN Nausea and/or Vomiting      CAPILLARY BLOOD GLUCOSE      POCT Blood Glucose.: 96 mg/dL (15 Kailash 2025 12:05)  POCT Blood Glucose.: 99 mg/dL (15 Kailash 2025 08:48)  POCT Blood Glucose.: 129 mg/dL (14 Jun 2025 22:14)  POCT Blood Glucose.: 110 mg/dL (14 Jun 2025 17:20)    I&O's Summary    14 Jun 2025 07:01  -  15 Kailash 2025 07:00  --------------------------------------------------------  IN: 1125 mL / OUT: 1400 mL / NET: -275 mL    15 Kailash 2025 07:01  -  15 Kailash 2025 15:49  --------------------------------------------------------  IN: 1125 mL / OUT: 375 mL / NET: 750 mL        PHYSICAL EXAM:  Vital Signs Last 24 Hrs  T(C): 36.7 (15 Kailash 2025 13:18), Max: 36.8 (14 Jun 2025 17:36)  T(F): 98 (15 Kailash 2025 13:18), Max: 98.3 (14 Jun 2025 17:36)  HR: 65 (15 Kailash 2025 13:18) (46 - 65)  BP: 144/71 (15 Kailash 2025 13:18) (140/78 - 172/75)  BP(mean): 107 (15 Kailash 2025 04:05) (107 - 107)  RR: 18 (15 Kailash 2025 13:18) (16 - 18)  SpO2: 98% (15 Kailash 2025 13:18) (95% - 99%)    Parameters below as of 15 Kailash 2025 13:18  Patient On (Oxygen Delivery Method): room air        CONSTITUTIONAL: NAD,  EYES: PERRLA; conjunctiva and sclera clear  ENMT: Moist oral mucosa,   RESPIRATORY: Normal respiratory effort; lungs are clear to auscultation bilaterally  CARDIOVASCULAR: Regular rate and rhythm, normal S1 and S2, no murmur   EXTS: No lower extremity edema; Peripheral pulses are 2+ bilaterally  ABDOMEN: Nontender to palpation, normoactive bowel sounds, no rebound/guarding;   MUSCLOSKELETAL:  no joint swelling or tenderness to palpation  PSYCH: affect appropriate  NEUROLOGY: A+O to person, place, and time; CN 2-12 are intact and symmetric; no gross sensory deficits;       LABS:                        16.3   8.86  )-----------( 180      ( 15 Kailash 2025 05:36 )             46.2     06-15    142  |  110[H]  |  18.8  ----------------------------<  102[H]  3.8   |  21.0[L]  |  0.73    Ca    8.3[L]      15 Kailash 2025 05:36  Phos  3.1     06-15  Mg     1.9     06-15    TPro  5.7[L]  /  Alb  3.5  /  TBili  0.5  /  DBili  0.1  /  AST  20  /  ALT  12  /  AlkPhos  77  06-15          Urinalysis Basic - ( 15 Kailash 2025 05:36 )    Color: x / Appearance: x / SG: x / pH: x  Gluc: 102 mg/dL / Ketone: x  / Bili: x / Urobili: x   Blood: x / Protein: x / Nitrite: x   Leuk Esterase: x / RBC: x / WBC x   Sq Epi: x / Non Sq Epi: x / Bacteria: x          RADIOLOGY & ADDITIONAL TESTS:  Results Reviewed:       from: MR Head w/wo IV Cont (06.14.25 @ 17:04) >  IMPRESSION: Unchanged sellar/suprasellar lesion with internal   proteinaceous or hemorrhagic contents. Differential diagnosis includes   but is not limited to a macroadenoma or complex Rathke cleft cyst versus   other lesion.    Multiple unchanged nonspecific abnormal white matter foci of T2/FLAIR   prolongation statistically favoring microvascular type changes.    < end of copied text >  < from: TTE W or WO Ultrasound Enhancing Agent (06.14.25 @ 14:01) >     CONCLUSIONS:      1. Left ventricular systolic function is normalwith an ejection fraction of 59 % by Alexander's method of disks with an ejection fraction visually estimated at 55 to 60 %.   2. Normal right ventricular cavity size and normal wall thickness,.   3. Left atrium is moderately dilated.   4. The right atrium is normal in size.   5. There is calcification of the mitral valve annulus.   6. Moderate aortic stenosis.   7. The peak transaortic velocity is 3.04 m/s, peak transaortic gradient is 37.0 mmHg and mean transaortic gradient is 21.0 mmHg with an LVOT/aortic valve VTI ratio of 0.39. The effective orifice area is estimated at 1.24 cm² by the continuity equation and indexed at 0.54 cm²/m².   8. No pericardial effusion seen.   9. No prior echocardiogram is available for comparison.    < end of copied text >  EEG:   Clinical Impression:  There were no epileptiform abnormalities recorded.    Intermittent sinus bradycardia.

## 2025-06-15 NOTE — PROGRESS NOTE ADULT - ASSESSMENT
69y/oM PMH CAD, HTN, HLD, DM, intermittent LBBB, pituitary macroadenoma, current smoker presenting to ER with confusion. Pt also with recent episodes near-syncope with blurry and "closing in" vision, which resolved after a couple minutes, has MCOT in place. Pt admitted for further work up.MRI brain no acute change.TTE stable ef.     AMS   Near syncope   Intermittent bradycardia  - tele   -CTH: No ICH, mass effect or infarct, +mild chronic ischemic changes in white matter ; +unchanged appearance of pituitary macroadenoma with suprasellar extension abutting optic chiasm ; +intracranial carotid arteries   -CTA Head/neck: no evidence significant stenosis or occlusion, no LVO, significant stenosis or vascular abnormality   -CT perfusion not post-processed, could not be evaluated   -MRI BRAIN Unchanged sellar/suprasellar lesion with internal proteinaceous or hemorrhagic contents. Differential diagnosis includes   but is not limited to a macroadenoma or complex Rathke cleft cyst versus other lesion.  -EEG no acute seizure  -f/u orthostatics   -trops flat  -seen by cardio   -f/u neuro rec  -TTE  ef 55-60%  -f/u MCOT interrogation   f/u cardiology rec    CAD s/p PCI   HTN, HLD  -monitor on tele   -cont home meds: metoprolol succinate 12.5mg qd, atorvastatin 40mg qd, valsartan-hctz 40/12.5mg (0.5 tab qd), plavix 75mg qd  -pt reports only taking plavix currently, no aspirin     DM   -hold home Jardiance  -HbA1c 5.8  -ISS     Glaucoma   -cont eye gtts     Tobacco dependence   -cessation advised   -declines NRT     vte ppx: lovenox sq      69y/oM PMH CAD, HTN, HLD, DM, intermittent LBBB, pituitary macroadenoma, current smoker presenting to ER with confusion. Pt also with recent episodes near-syncope with blurry and "closing in" vision, which resolved after a couple minutes, has MCOT in place. Pt admitted for further work up.MRI brain no acute change.TTE stable ef.     AMS   Near syncope   Intermittent bradycardia  NSVT   - tele   -CTH: No ICH, mass effect or infarct, +mild chronic ischemic changes in white matter ; +unchanged appearance of pituitary macroadenoma with suprasellar extension abutting optic chiasm ; +intracranial carotid arteries   -CTA Head/neck: no evidence significant stenosis or occlusion, no LVO, significant stenosis or vascular abnormality   -CT perfusion not post-processed, could not be evaluated   -MRI BRAIN Unchanged sellar/suprasellar lesion with internal proteinaceous or hemorrhagic contents. Differential diagnosis includes   but is not limited to a macroadenoma or complex Rathke cleft cyst versus other lesion.  -EEG no acute seizure  -f/u orthostatics   -trops flat  -seen by cardio   -f/u neuro rec  -TTE  ef 55-60%  - f/u cardiology rec.   -no further work up per neurology    CAD s/p PCI   HTN, HLD  -monitor on tele   - home meds: metoprolol succinate 12.5mg qd, atorvastatin 40mg qd, valsartan-hctz 40/12.5mg (0.5 tab qd), plavix 75mg qd  -pt reports only taking plavix currently, no aspirin     DM   -hold home Jardiance  -HbA1c 5.8  -ISS     Glaucoma   -cont eye gtts     Tobacco dependence   -cessation advised   -declines NRT     vte ppx: lovenox sq     Disposition: pending. monitor HR-intermittent bradycardia.   Plan of care dw pt  DW neurology  spoke with cardiology team rec to continue bb for now, given Episode of NSVT w/intermittent bradycardia likely need EP eval.will f/u further rec

## 2025-06-15 NOTE — EEG REPORT - NS EEG TEXT BOX
KAMLESH NOE [id number]     Study Date: 		06-14-25 ([id number])   Duration: 3h 20min   --------------------------------------------------------------------------------------------------  History:  CC/ HPI Patient is a 69y old  Male who presents with a chief complaint of   MEDICATIONS  (STANDING):  --------------------------------------------------------------------------------------------------  Study Interpretation:    [[[Abbreviation Key:  PDR=alpha rhythm/posterior dominant rhythm. A-P=anterior posterior gradient.  Amplitude: ‘very low’:<20; ‘low’:20-50; ‘medium’:; ‘high’:>200uV.  Persistence for periodic/rhythmic patterns (% of epoch) ‘rare’:<1%; ‘occasional’:1-10%; ‘frequent’:10-50%; ‘abundant’:50-90%; ‘continuous’:>90%.  Persistence for sporadic discharges: ‘rare’:<1/hr; ‘occasional’:1/min-1/hr; ‘frequent’:>1/min; ‘abundant’:>1/10 sec.  GRDA=generalized rhythmic delta activity; FIRDA=frontal intermittent GRDA; LRDA=lateralized rhythmic delta activity; TIRDA=temporal intermittent rhythmic delta activity;  LPD=PLED=lateralized periodic discharges; GPD=generalized periodic discharges; BiPDs=BiPLEDs=bilateral independent periodic epileptiform discharges; SIRPID=stimulus induced rhythmic, periodic, or ictal appearing discharges; BIRDs=brief potentially ictal rhythmic discharges >4 Hz, lasting .5-10s; PFA=paroxysmal bursts of beta/gamma; LVFA=low voltage fast activity.  Modifiers: +F=with fast component; +S=with spike component; +R=with rhythmic component.  S-B=burst suppression pattern.  Max=maximal. N1-drowsy; N2-stage II sleep; N3-slow wave sleep. SSS/BETS=small sharp spikes/benign epileptiform transients of sleep. HV=hyperventilation; PS=photic stimulation]]]    Daily EEG Visual Analysis    FINDINGS:      Background:  Continuity: continuous  Symmetry: symmetric  PDR: 9-9.5 Hz activity, with amplitude to 40 uV, that attenuated to eye opening.  Low amplitude frontal beta noted in wakefulness.  Reactivity: present  Voltage: normal, mostly 20-150uV  Anterior Posterior Gradient: present  Other background findings: none  Breach: absent    Background Slowing:  Generalized slowing: none was present.  Focal slowing: none was present.    State Changes:   -N2 sleep transients were not recorded.    Sporadic Epileptiform Discharges:    None    Rhythmic and Periodic Patterns (RPPs):  None     Electrographic and Electroclinical seizures:  None    Other Clinical Events:  None    Activation Procedures:   -Hyperventilation was not performed.    -Photic stimulation was not performed.    Artifacts:  Intermittent myogenic and movement artifacts were noted.    ECG:  The heart rate on single channel ECG was predominantly between 50-60 BPM.    EEG Classification / Summary:  Normal  EEG in the awake state  Clinical Impression:  There were no epileptiform abnormalities recorded.        *PRELIM READ, ATTENDING REVIEW PENDING*     -------------------------------------------------------------------------------------------------------  [facility] EEG Reading Room Ph#: [phone]  Epilepsy Answering Service after 5PM and before 8:30AM: Ph#: [phone]    Khoa Baca,    Epilepsy Fellow    KAMLESH NOE [id number]     Study Date: 		06-14-25 ([id number])   Duration: 3h 20min   --------------------------------------------------------------------------------------------------  History:  CC/ HPI Patient is a 69y old  Male who presents with a chief complaint of   MEDICATIONS  (STANDING):  --------------------------------------------------------------------------------------------------  Study Interpretation:    [[[Abbreviation Key:  PDR=alpha rhythm/posterior dominant rhythm. A-P=anterior posterior gradient.  Amplitude: ‘very low’:<20; ‘low’:20-50; ‘medium’:; ‘high’:>200uV.  Persistence for periodic/rhythmic patterns (% of epoch) ‘rare’:<1%; ‘occasional’:1-10%; ‘frequent’:10-50%; ‘abundant’:50-90%; ‘continuous’:>90%.  Persistence for sporadic discharges: ‘rare’:<1/hr; ‘occasional’:1/min-1/hr; ‘frequent’:>1/min; ‘abundant’:>1/10 sec.  GRDA=generalized rhythmic delta activity; FIRDA=frontal intermittent GRDA; LRDA=lateralized rhythmic delta activity; TIRDA=temporal intermittent rhythmic delta activity;  LPD=PLED=lateralized periodic discharges; GPD=generalized periodic discharges; BiPDs=BiPLEDs=bilateral independent periodic epileptiform discharges; SIRPID=stimulus induced rhythmic, periodic, or ictal appearing discharges; BIRDs=brief potentially ictal rhythmic discharges >4 Hz, lasting .5-10s; PFA=paroxysmal bursts of beta/gamma; LVFA=low voltage fast activity.  Modifiers: +F=with fast component; +S=with spike component; +R=with rhythmic component.  S-B=burst suppression pattern.  Max=maximal. N1-drowsy; N2-stage II sleep; N3-slow wave sleep. SSS/BETS=small sharp spikes/benign epileptiform transients of sleep. HV=hyperventilation; PS=photic stimulation]]]    Daily EEG Visual Analysis    FINDINGS:      Background:  Continuity: continuous  Symmetry: symmetric  PDR: 9-9.5 Hz activity, with amplitude to 40 uV, that attenuated to eye opening.  Low amplitude frontal beta noted in wakefulness.  Reactivity: present  Voltage: normal, mostly 20-150uV  Anterior Posterior Gradient: present  Other background findings: none  Breach: absent    Background Slowing:  Generalized slowing: none was present.  Focal slowing: none was present.    State Changes:   -N2 sleep transients were not recorded.    Sporadic Epileptiform Discharges:    None    Rhythmic and Periodic Patterns (RPPs):  None     Electrographic and Electroclinical seizures:  None    Other Clinical Events:  None    Activation Procedures:   -Hyperventilation was not performed.    -Photic stimulation was not performed.    Artifacts:  Intermittent myogenic and movement artifacts were noted.    ECG:  The heart rate on single channel ECG was predominantly between 50-60 BPM.    EEG Classification / Summary:  Normal  EEG in the awake state  Clinical Impression:  There were no epileptiform abnormalities recorded.            -------------------------------------------------------------------------------------------------------  [facility] EEG Reading Room Ph#: [phone]  Epilepsy Answering Service after 5PM and before 8:30AM: Ph#: [phone]    Khoa Baca,    Epilepsy Fellow

## 2025-06-16 LAB
A PHAGOCYTOPH DNA BLD QL NAA+PROBE: NEGATIVE — SIGNIFICANT CHANGE UP
ANION GAP SERPL CALC-SCNC: 11 MMOL/L — SIGNIFICANT CHANGE UP (ref 5–17)
B MICROTI DNA BLD QL NAA+PROBE: NEGATIVE — SIGNIFICANT CHANGE UP
B MIYAMOTOI GLPQ BLD QL NAA+NON-PROBE: NEGATIVE — SIGNIFICANT CHANGE UP
BABESIA DNA SPEC QL NAA+PROBE: NEGATIVE — SIGNIFICANT CHANGE UP
BABESIA DNA SPEC QL NAA+PROBE: NEGATIVE — SIGNIFICANT CHANGE UP
BUN SERPL-MCNC: 16 MG/DL — SIGNIFICANT CHANGE UP (ref 8–20)
CALCIUM SERPL-MCNC: 8.5 MG/DL — SIGNIFICANT CHANGE UP (ref 8.4–10.5)
CHLORIDE SERPL-SCNC: 110 MMOL/L — HIGH (ref 96–108)
CO2 SERPL-SCNC: 22 MMOL/L — SIGNIFICANT CHANGE UP (ref 22–29)
CREAT SERPL-MCNC: 0.8 MG/DL — SIGNIFICANT CHANGE UP (ref 0.5–1.3)
E CHAFFEENSIS DNA BLD QL NAA+PROBE: NEGATIVE — SIGNIFICANT CHANGE UP
E EWINGII DNA SPEC QL NAA+PROBE: NEGATIVE — SIGNIFICANT CHANGE UP
EGFR: 96 ML/MIN/1.73M2 — SIGNIFICANT CHANGE UP
EGFR: 96 ML/MIN/1.73M2 — SIGNIFICANT CHANGE UP
EHRLICHIA DNA SPEC QL NAA+PROBE: NEGATIVE — SIGNIFICANT CHANGE UP
GLUCOSE BLDC GLUCOMTR-MCNC: 114 MG/DL — HIGH (ref 70–99)
GLUCOSE BLDC GLUCOMTR-MCNC: 118 MG/DL — HIGH (ref 70–99)
GLUCOSE BLDC GLUCOMTR-MCNC: 89 MG/DL — SIGNIFICANT CHANGE UP (ref 70–99)
GLUCOSE BLDC GLUCOMTR-MCNC: 98 MG/DL — SIGNIFICANT CHANGE UP (ref 70–99)
GLUCOSE SERPL-MCNC: 93 MG/DL — SIGNIFICANT CHANGE UP (ref 70–99)
HCT VFR BLD CALC: 45.5 % — SIGNIFICANT CHANGE UP (ref 39–50)
HGB BLD-MCNC: 15.4 G/DL — SIGNIFICANT CHANGE UP (ref 13–17)
MAGNESIUM SERPL-MCNC: 2 MG/DL — SIGNIFICANT CHANGE UP (ref 1.6–2.6)
MCHC RBC-ENTMCNC: 30.9 PG — SIGNIFICANT CHANGE UP (ref 27–34)
MCHC RBC-ENTMCNC: 33.8 G/DL — SIGNIFICANT CHANGE UP (ref 32–36)
MCV RBC AUTO: 91.4 FL — SIGNIFICANT CHANGE UP (ref 80–100)
NRBC # BLD AUTO: 0 K/UL — SIGNIFICANT CHANGE UP (ref 0–0)
NRBC # FLD: 0 K/UL — SIGNIFICANT CHANGE UP (ref 0–0)
NRBC BLD AUTO-RTO: 0 /100 WBCS — SIGNIFICANT CHANGE UP (ref 0–0)
PHOSPHATE SERPL-MCNC: 3.3 MG/DL — SIGNIFICANT CHANGE UP (ref 2.4–4.7)
PLATELET # BLD AUTO: 186 K/UL — SIGNIFICANT CHANGE UP (ref 150–400)
PMV BLD: 10.8 FL — SIGNIFICANT CHANGE UP (ref 7–13)
POTASSIUM SERPL-MCNC: 4.2 MMOL/L — SIGNIFICANT CHANGE UP (ref 3.5–5.3)
POTASSIUM SERPL-SCNC: 4.2 MMOL/L — SIGNIFICANT CHANGE UP (ref 3.5–5.3)
RBC # BLD: 4.98 M/UL — SIGNIFICANT CHANGE UP (ref 4.2–5.8)
RBC # FLD: 13.7 % — SIGNIFICANT CHANGE UP (ref 10.3–14.5)
SODIUM SERPL-SCNC: 143 MMOL/L — SIGNIFICANT CHANGE UP (ref 135–145)
T4 FREE SERPL-MCNC: 0.8 NG/DL — LOW (ref 0.9–1.8)
TSH SERPL-MCNC: 4.87 UIU/ML — HIGH (ref 0.27–4.2)
WBC # BLD: 8.74 K/UL — SIGNIFICANT CHANGE UP (ref 3.8–10.5)
WBC # FLD AUTO: 8.74 K/UL — SIGNIFICANT CHANGE UP (ref 3.8–10.5)

## 2025-06-16 PROCEDURE — 99233 SBSQ HOSP IP/OBS HIGH 50: CPT

## 2025-06-16 RX ADMIN — DORZOLAMIDE HYDROCHLORIDE AND TIMOLOL MALEATE 1 DROP(S): 20; 5 SOLUTION/ DROPS OPHTHALMIC at 21:20

## 2025-06-16 RX ADMIN — LATANOPROST PF 1 DROP(S): 0.05 SOLUTION/ DROPS OPHTHALMIC at 22:02

## 2025-06-16 RX ADMIN — DORZOLAMIDE HYDROCHLORIDE AND TIMOLOL MALEATE 1 DROP(S): 20; 5 SOLUTION/ DROPS OPHTHALMIC at 05:50

## 2025-06-16 RX ADMIN — Medication 650 MILLIGRAM(S): at 13:06

## 2025-06-16 RX ADMIN — Medication 650 MILLIGRAM(S): at 14:06

## 2025-06-16 RX ADMIN — Medication 650 MILLIGRAM(S): at 22:19

## 2025-06-16 RX ADMIN — Medication 3 MILLIGRAM(S): at 01:14

## 2025-06-16 RX ADMIN — CLOPIDOGREL BISULFATE 75 MILLIGRAM(S): 75 TABLET, FILM COATED ORAL at 09:32

## 2025-06-16 RX ADMIN — Medication 650 MILLIGRAM(S): at 21:19

## 2025-06-16 RX ADMIN — ATORVASTATIN CALCIUM 40 MILLIGRAM(S): 80 TABLET, FILM COATED ORAL at 21:19

## 2025-06-16 NOTE — PROGRESS NOTE ADULT - ASSESSMENT
69y/oM PMH CAD, HTN, HLD, DM, intermittent LBBB, pituitary macroadenoma, current smoker presenting to ER with confusion. Pt also with recent episodes near-syncope with blurry and "closing in" vision, which resolved after a couple minutes, has MCOT in place. Pt admitted for further work up.MRI brain no acute change.TTE stable ef.     AMS   Near syncope   Sinus  bradycardia  NSVT   - tele   -CTH: No ICH, mass effect or infarct, +mild chronic ischemic changes in white matter ; +unchanged appearance of pituitary macroadenoma with suprasellar extension abutting optic chiasm ; +intracranial carotid arteries   -CTA Head/neck: no evidence significant stenosis or occlusion, no LVO, significant stenosis or vascular abnormality   -CT perfusion not post-processed, could not be evaluated   -MRI BRAIN Unchanged sellar/suprasellar lesion with internal proteinaceous or hemorrhagic contents. Differential diagnosis includes   but is not limited to a macroadenoma or complex Rathke cleft cyst versus other lesion.  -EEG no acute seizure  -stable orthostatics   -trops flat  -seen by cardio   -f/u neuro rec  -TTE  ef 55-60%  - f/u cardiology rec.   -no further work up per neurology  -Spoke with THERESE sosa.rec dc BB. will ambulate pt. monitor HR    CAD s/p PCI   HTN, HLD  -monitor on tele   - home meds: metoprolol succinate 12.5mg qd, atorvastatin 40mg qd, valsartan-hctz 40/12.5mg (0.5 tab qd), plavix 75mg qd  -pt reports only taking plavix currently, no aspirin     DM   -hold home Jardiance  -HbA1c 5.8  -ISS     Glaucoma   -cont eye gtts     Tobacco dependence   -cessation advised   -declines NRT     vte ppx: lovenox sq     Disposition: pending. monitor HR-bradycardia.   Plan of care dw pt and wife  ZHENG EP

## 2025-06-16 NOTE — PROGRESS NOTE ADULT - SUBJECTIVE AND OBJECTIVE BOX
Patient is a 69y old  Male who presents with a chief complaint of Pituitary adenoma (16 Jun 2025 11:08)      pt denies any dizziness,cp,sob  REVIEW OF SYSTEMS: All systems are reviewed and found to be negative except above    MEDICATIONS  (STANDING):  atorvastatin 40 milliGRAM(s) Oral at bedtime  clopidogrel Tablet 75 milliGRAM(s) Oral daily  dextrose 5%. 1000 milliLiter(s) (50 mL/Hr) IV Continuous <Continuous>  dextrose 5%. 1000 milliLiter(s) (100 mL/Hr) IV Continuous <Continuous>  dextrose 50% Injectable 25 Gram(s) IV Push once  dextrose 50% Injectable 12.5 Gram(s) IV Push once  dextrose 50% Injectable 25 Gram(s) IV Push once  dorzolamide 2%/timolol 0.5% Ophthalmic Solution 1 Drop(s) Both EYES two times a day  enoxaparin Injectable 40 milliGRAM(s) SubCutaneous every 24 hours  glucagon  Injectable 1 milliGRAM(s) IntraMuscular once  insulin lispro (ADMELOG) corrective regimen sliding scale   SubCutaneous three times a day before meals  insulin lispro (ADMELOG) corrective regimen sliding scale   SubCutaneous at bedtime  latanoprost 0.005% Ophthalmic Solution 1 Drop(s) Both EYES at bedtime  sacubitril 24 mG/valsartan 26 mG 1 Tablet(s) Oral two times a day  sodium chloride 0.9%. 1000 milliLiter(s) (70 mL/Hr) IV Continuous <Continuous>    MEDICATIONS  (PRN):  acetaminophen     Tablet .. 650 milliGRAM(s) Oral every 6 hours PRN Temp greater or equal to 38C (100.4F), Mild Pain (1 - 3)  aluminum hydroxide/magnesium hydroxide/simethicone Suspension 30 milliLiter(s) Oral every 4 hours PRN Dyspepsia  atropine Injectable 0.5 milliGRAM(s) IV Push once PRN HR <30 for >5min or if becomes symptomatic  dextrose Oral Gel 15 Gram(s) Oral once PRN Blood Glucose LESS THAN 70 milliGRAM(s)/deciliter  melatonin 3 milliGRAM(s) Oral at bedtime PRN Insomnia  ondansetron Injectable 4 milliGRAM(s) IV Push every 8 hours PRN Nausea and/or Vomiting      CAPILLARY BLOOD GLUCOSE      POCT Blood Glucose.: 89 mg/dL (16 Jun 2025 08:56)  POCT Blood Glucose.: 93 mg/dL (15 Kailash 2025 22:34)  POCT Blood Glucose.: 135 mg/dL (15 Kailash 2025 16:53)    I&O's Summary    15 Kailash 2025 07:01  -  16 Jun 2025 07:00  --------------------------------------------------------  IN: 2240 mL / OUT: 1575 mL / NET: 665 mL    16 Jun 2025 07:01  -  16 Jun 2025 12:13  --------------------------------------------------------  IN: 0 mL / OUT: 350 mL / NET: -350 mL        PHYSICAL EXAM:  Vital Signs Last 24 Hrs  T(C): 36.7 (16 Jun 2025 08:45), Max: 36.8 (15 Kailash 2025 21:28)  T(F): 98 (16 Jun 2025 08:45), Max: 98.2 (15 Kailash 2025 21:28)  HR: 47 (16 Jun 2025 08:45) (37 - 65)  BP: 155/72 (16 Jun 2025 08:45) (136/63 - 155/72)  BP(mean): 87 (16 Jun 2025 00:20) (87 - 87)  RR: 18 (16 Jun 2025 08:45) (17 - 18)  SpO2: 98% (16 Jun 2025 08:45) (97% - 98%)    Parameters below as of 16 Jun 2025 08:45  Patient On (Oxygen Delivery Method): room air        CONSTITUTIONAL: NAD,  EYES: PERRLA; conjunctiva and sclera clear  ENMT: Moist oral mucosa,   RESPIRATORY: Normal respiratory effort; lungs are clear to auscultation bilaterally  CARDIOVASCULAR: Regular rate and rhythm, normal S1 and S2, no murmur   EXTS: No lower extremity edema; Peripheral pulses are 2+ bilaterally  ABDOMEN: Nontender to palpation, normoactive bowel sounds, no rebound/guarding;   MUSCLOSKELETAL: no joints swelling/TN  NEUROLOGY: A+O to person, place, and time; CN 2-12 are intact and symmetric; no gross sensory deficits;       LABS:                        15.4   8.74  )-----------( 186      ( 16 Jun 2025 04:35 )             45.5     06-16    143  |  110[H]  |  16.0  ----------------------------<  93  4.2   |  22.0  |  0.80    Ca    8.5      16 Jun 2025 04:35  Phos  3.3     06-16  Mg     2.0     06-16    TPro  5.7[L]  /  Alb  3.5  /  TBili  0.5  /  DBili  0.1  /  AST  20  /  ALT  12  /  AlkPhos  77  06-15          Urinalysis Basic - ( 16 Jun 2025 04:35 )    Color: x / Appearance: x / SG: x / pH: x  Gluc: 93 mg/dL / Ketone: x  / Bili: x / Urobili: x   Blood: x / Protein: x / Nitrite: x   Leuk Esterase: x / RBC: x / WBC x   Sq Epi: x / Non Sq Epi: x / Bacteria: x          RADIOLOGY & ADDITIONAL TESTS:  Results Reviewed:

## 2025-06-16 NOTE — CONSULT NOTE ADULT - SUBJECTIVE AND OBJECTIVE BOX
Community Cardiologist: Dr. Hernandes    69 year old male, current smoker, with CAD (s/p Ramus PRAKASH x 1 on 2023, LM, LAD and RCA with no obstruction), HTN, HLD, DM, intermittent LBBB, and pituitary macroadenoma who was admitted to [facility] with AMS and confusion. Patient also with recent episodes near-syncope with blurry and "closing in" vision, which resolved after a couple minutes. Denies LOC, fall , head trauma.       PAST MEDICAL & SURGICAL HISTORY:  HTN (hypertension)  Hyperlipidemia  Unspecified disorder of synovium and tendon, right shoulder  Sprain of other specified parts of unspecified shoulder girdle, initial encounter  Neck mass  posterior  History of lumbar laminectomy    H/O neck surgery      REVIEW OF SYSTEMS  General:	  Skin/Breast:  Ophthalmologic:  ENMT:	  Respiratory and Thorax:  Cardiovascular:	  Gastrointestinal:	  Genitourinary:	  Musculoskeletal:	  Neurological:	  Psychiatric:	  Hematology/Lymphatics:	  Endocrine:	  Allergic/Immunologic:	    MEDICATIONS  (STANDING):  atorvastatin 40 milliGRAM(s) Oral at bedtime  clopidogrel Tablet 75 milliGRAM(s) Oral daily  dextrose 5%. 1000 milliLiter(s) (50 mL/Hr) IV Continuous <Continuous>  dextrose 5%. 1000 milliLiter(s) (100 mL/Hr) IV Continuous <Continuous>  dextrose 50% Injectable 25 Gram(s) IV Push once  dextrose 50% Injectable 12.5 Gram(s) IV Push once  dextrose 50% Injectable 25 Gram(s) IV Push once  dorzolamide 2%/timolol 0.5% Ophthalmic Solution 1 Drop(s) Both EYES two times a day  enoxaparin Injectable 40 milliGRAM(s) SubCutaneous every 24 hours  glucagon  Injectable 1 milliGRAM(s) IntraMuscular once  insulin lispro (ADMELOG) corrective regimen sliding scale   SubCutaneous three times a day before meals  insulin lispro (ADMELOG) corrective regimen sliding scale   SubCutaneous at bedtime  latanoprost 0.005% Ophthalmic Solution 1 Drop(s) Both EYES at bedtime  metoprolol succinate ER 12.5 milliGRAM(s) Oral daily  sacubitril 24 mG/valsartan 26 mG 1 Tablet(s) Oral two times a day  sodium chloride 0.9%. 1000 milliLiter(s) (70 mL/Hr) IV Continuous <Continuous>    MEDICATIONS  (PRN):  acetaminophen     Tablet .. 650 milliGRAM(s) Oral every 6 hours PRN Temp greater or equal to 38C (100.4F), Mild Pain (1 - 3)  aluminum hydroxide/magnesium hydroxide/simethicone Suspension 30 milliLiter(s) Oral every 4 hours PRN Dyspepsia  atropine Injectable 0.5 milliGRAM(s) IV Push once PRN HR <30 for >5min or if becomes symptomatic  dextrose Oral Gel 15 Gram(s) Oral once PRN Blood Glucose LESS THAN 70 milliGRAM(s)/deciliter  melatonin 3 milliGRAM(s) Oral at bedtime PRN Insomnia  ondansetron Injectable 4 milliGRAM(s) IV Push every 8 hours PRN Nausea and/or Vomiting    Allergies  No Known Allergies    SOCIAL HISTORY: current smoker    FAMILY HISTORY:     Vital Signs Last 24 Hrs  T(C): 36.7 (2025 08:45), Max: 36.8 (15 Kailash 2025 21:28)  T(F): 98 (2025 08:45), Max: 98.2 (15 Kailash 2025 21:28)  HR: 47 (2025 08:45) (37 - 65)  BP: 155/72 (2025 08:45) (136/63 - 155/72)  BP(mean): 87 (2025 00:20) (87 - 87)  RR: 18 (2025 08:45) (17 - 18)  SpO2: 98% (2025 08:45) (97% - 98%)    Physical Exam:  Constitutional: AAOx3, NAD  Neck: supple, No JVD  Cardiovascular: +S1S2 RRR, no murmurs, rubs, gallops   Pulmonary: CTA b/l, unlabored, no wheezes, rales. rhonci  Abdomen: +BS, soft NTND  Extremities: no edema b/l, +distal pulses b/l  Neuro: non focal, speech clear, CARNEY x 4    LABS:                        15.4   8.74  )-----------( 186      ( 2025 04:35 )             45.5     143  |  110[H]  |  16.0  ----------------------------<  93  4.2   |  22.0  |  0.80  Ca    8.5      2025 04:35  Phos  3.3     06-16  Mg     2.0     06-16  TPro  5.7[L]  /  Alb  3.5  /  TBili  0.5  /  DBili  0.1  /  AST  20  /  ALT  12  /  AlkPhos  77  06-15  LIVER FUNCTIONS - ( 15 Kailash 2025 05:36 )  Alb: 3.5 g/dL / Pro: 5.7 g/dL / ALK PHOS: 77 U/L / ALT: 12 U/L / AST: 20 U/L / GGT: x           RADIOLOGY & ADDITIONAL STUDIES:  TTE 25  CONCLUSIONS:   1. Left ventricular systolic function is normalwith an ejection fraction of 59 % by Alexander's method of disks with an ejection fraction visually estimated at 55 to 60 %.   2. Normal right ventricular cavity size and normal wall thickness,.   3. Left atrium is moderately dilated.   4. The right atrium is normal in size.   5. There is calcification of the mitral valve annulus.   6. Moderate aortic stenosis.   7. The peak transaortic velocity is 3.04 m/s, peak transaortic gradient is 37.0 mmHg and mean transaortic gradient is 21.0 mmHg with an LVOT/aortic valve VTI ratio of 0.39. The effective orifice area is estimated at 1.24 cm² by the continuity equation and indexed at 0.54 cm²/m².   8. No pericardial effusion seen.   9. No prior echocardiogram is available for comparison.    [facility] 2023  Conclusions:   There is significant single vessel CAD involving the Ramus Intermedius  Successful PCI of the Ramus Intermedius with PRAKASH x 1 with excellent  angiographic appearance post intevention,     MR head 25  IMPRESSION: Unchanged sellar/suprasellar lesion with internal   proteinaceous or hemorrhagic contents. Differential diagnosis includes   but is not limited to a macroadenoma or complex Rathke cleft cyst versus   other lesion.  Multiple unchanged nonspecific abnormal white matter foci of T2/FLAIR   prolongation statistically favoring microvascular type changes.    Holter  - 2025  One episode of NSVT. SR with LBBB conduction     EK2024: SR at 63bpm; QRSD 162ms; LBBB IL 198ms    A/P   Community Cardiologist: Dr. Hernandes    69 year old male, current smoker, with CAD (s/p Ramus PRAKASH x 1 on 2023, LM, LAD and RCA with no obstruction), HTN, HLD, DM, LBBB, and pituitary macroadenoma who was admitted to [facility] with AMS and confusion. Patient also with recent episodes near-syncope with blurry and "closing in" vision, which resolved after a couple minutes. Denies LOC, fall , head trauma.     Patient works as a . Reports a month ago felt very dizzy while driving and had to pull over the car while driving. Denies any exertional dizziness, syncope, or fatigue. Wore a Holter in  with PVC burden <1% and average HR at 68bpm. No pauses were recorded. Several NSVT events recorded. Currently wearing a 30 day Holter/MCOT which is off. Recorded ST with PVCs and 12 beats of NSVT on 25 @ 13:11 without symptoms. Referred by Dr. Hernandes of [facility] which was not performed this admission. Was asked to start Lopressor 12.5mg PO BID which is on hold now.     PAST MEDICAL & SURGICAL HISTORY:  HTN (hypertension)  Hyperlipidemia  Unspecified disorder of synovium and tendon, right shoulder  Sprain of other specified parts of unspecified shoulder girdle, initial encounter  Neck mass  posterior  History of lumbar laminectomy    H/O neck surgery      REVIEW OF SYSTEMS  General: - fever or chills, - fatigue  Skin/Breast: - rashes  Ophthalmologic: + blurred vision  ENMT: - sore throat  Respiratory and Thorax: - cough  Cardiovascular: - dyspnea, - palpitations, - chest pain  Gastrointestinal: - N/V/D/C  Genitourinary: - dysuria  Musculoskeletal:	 - arthritis  Neurological: - weaknesses  Psychiatric: - anxiety 	    MEDICATIONS  (STANDING):  atorvastatin 40 milliGRAM(s) Oral at bedtime  clopidogrel Tablet 75 milliGRAM(s) Oral daily  dextrose 5%. 1000 milliLiter(s) (50 mL/Hr) IV Continuous <Continuous>  dextrose 5%. 1000 milliLiter(s) (100 mL/Hr) IV Continuous <Continuous>  dextrose 50% Injectable 25 Gram(s) IV Push once  dextrose 50% Injectable 12.5 Gram(s) IV Push once  dextrose 50% Injectable 25 Gram(s) IV Push once  dorzolamide 2%/timolol 0.5% Ophthalmic Solution 1 Drop(s) Both EYES two times a day  enoxaparin Injectable 40 milliGRAM(s) SubCutaneous every 24 hours  glucagon  Injectable 1 milliGRAM(s) IntraMuscular once  insulin lispro (ADMELOG) corrective regimen sliding scale   SubCutaneous three times a day before meals  insulin lispro (ADMELOG) corrective regimen sliding scale   SubCutaneous at bedtime  latanoprost 0.005% Ophthalmic Solution 1 Drop(s) Both EYES at bedtime  metoprolol succinate ER 12.5 milliGRAM(s) Oral daily  sacubitril 24 mG/valsartan 26 mG 1 Tablet(s) Oral two times a day  sodium chloride 0.9%. 1000 milliLiter(s) (70 mL/Hr) IV Continuous <Continuous>    MEDICATIONS  (PRN):  acetaminophen     Tablet .. 650 milliGRAM(s) Oral every 6 hours PRN Temp greater or equal to 38C (100.4F), Mild Pain (1 - 3)  aluminum hydroxide/magnesium hydroxide/simethicone Suspension 30 milliLiter(s) Oral every 4 hours PRN Dyspepsia  atropine Injectable 0.5 milliGRAM(s) IV Push once PRN HR <30 for >5min or if becomes symptomatic  dextrose Oral Gel 15 Gram(s) Oral once PRN Blood Glucose LESS THAN 70 milliGRAM(s)/deciliter  melatonin 3 milliGRAM(s) Oral at bedtime PRN Insomnia  ondansetron Injectable 4 milliGRAM(s) IV Push every 8 hours PRN Nausea and/or Vomiting    Allergies  No Known Allergies    SOCIAL HISTORY: current smoker 1ppd x 50 years     FAMILY HISTORY: no family hx of arrhythmias     Vital Signs Last 24 Hrs  T(C): 36.7 (2025 08:45), Max: 36.8 (15 Kailash 2025 21:28)  T(F): 98 (2025 08:45), Max: 98.2 (15 Kailash 2025 21:28)  HR: 47 (2025 08:45) (37 - 65)  BP: 155/72 (2025 08:45) (136/63 - 155/72)  BP(mean): 87 (2025 00:20) (87 - 87)  RR: 18 (2025 08:45) (17 - 18)  SpO2: 98% (2025 08:45) (97% - 98%)    Physical Exam:  Constitutional: AAOx3, NAD  Neck: supple, No JVD  Cardiovascular: +S1S2 RRR, no murmurs, rubs, gallops   Pulmonary: CTA b/l, unlabored, no wheezes, rales. rhonci  Abdomen: +BS, soft NTND  Extremities: no edema b/l, +distal pulses b/l  Neuro: non focal, speech clear, CARNEY x 4    LABS:                        15.4   8.74  )-----------( 186      ( 2025 04:35 )             45.5     143  |  110[H]  |  16.0  ----------------------------<  93  4.2   |  22.0  |  0.80  Ca    8.5      2025 04:35  Phos  3.3     06-16  Mg     2.0     06-16  TPro  5.7[L]  /  Alb  3.5  /  TBili  0.5  /  DBili  0.1  /  AST  20  /  ALT  12  /  AlkPhos  77  06-15  LIVER FUNCTIONS - ( 15 Kailash 2025 05:36 )  Alb: 3.5 g/dL / Pro: 5.7 g/dL / ALK PHOS: 77 U/L / ALT: 12 U/L / AST: 20 U/L / GGT: x           RADIOLOGY & ADDITIONAL STUDIES:  TTE 25  CONCLUSIONS:   1. Left ventricular systolic function is normalwith an ejection fraction of 59 % by Alexander's method of disks with an ejection fraction visually estimated at 55 to 60 %.   2. Normal right ventricular cavity size and normal wall thickness,.   3. Left atrium is moderately dilated.   4. The right atrium is normal in size.   5. There is calcification of the mitral valve annulus.   6. Moderate aortic stenosis.   7. The peak transaortic velocity is 3.04 m/s, peak transaortic gradient is 37.0 mmHg and mean transaortic gradient is 21.0 mmHg with an LVOT/aortic valve VTI ratio of 0.39. The effective orifice area is estimated at 1.24 cm² by the continuity equation and indexed at 0.54 cm²/m².   8. No pericardial effusion seen.   9. No prior echocardiogram is available for comparison.    [facility] 2023  Conclusions:   There is significant single vessel CAD involving the Ramus Intermedius  Successful PCI of the Ramus Intermedius with PRAKASH x 1 with excellent  angiographic appearance post intervention     MR head 25  IMPRESSION: Unchanged sellar/suprasellar lesion with internal   proteinaceous or hemorrhagic contents. Differential diagnosis includes   but is not limited to a macroadenoma or complex Rathke cleft cyst versus   other lesion.  Multiple unchanged nonspecific abnormal white matter foci of T2/FLAIR   prolongation statistically favoring microvascular type changes.    Exercise Stress test 3/10/24  CONCLUSIONS  1. Technically difficult image quality.  2. Patient achieved 12.00 METs, which is consistent with good exercise capacity.  3. No cardiac symptoms. Baseline LBBB.  4. Abnormal septal motion due to LBBB.  5. No ischemia/infarction.  6. Baseline/resting valve assessment showed moderate aortic stenosis.  7. At stress, valve assessment revealed moderate to severe aortic stenosis.  8. At stress highest AOV Vmax= 3.94 m/s; P.4mmHg; MnGrad= 33.0.  9. Normal pulmonary pressures at rest, unable to obtain pulmonary pressures upon exercise.  10. Resting baseline LV ejection fraction was was estimated at approximately 40 to 45% (mildly decreased EF). Immediate post-stress the LV ejection fraction is was estimated at approximately 50 to 55% (normal EF).  Holter  - 2025  One episode of NSVT. SR with LBBB conduction     EK2024: SR at 63bpm; QRSD 162ms; LBBB [place] 198ms    A/P  69 year old male, current smoker, with CAD (s/p Ramus PRAKASH x 1 on 2023, LM, LAD and RCA with no obstruction), HTN, HLD, DM, LBBB, and pituitary macroadenoma who was admitted to [facility] with AMS. EP consulted regarding LBBB and near syncope    Baseline bradycardia worse during sleeping hours  Wife reports snoring  Normal exercise stress test with 12 METS on 3/10/24  EKG with LBBB since 2021  Increasing frequency of near syncopal events over last year. Typically occur at rest or while driving  No pauses of sustained VT events while admitted     - Cardiology note appreciated - signed off two days ago  - Would hold beta blocker for now.   - Ambulate patient with assistance.   - Needs outpatient polysomnography  - Should continue to wear MCOT.

## 2025-06-16 NOTE — CONSULT NOTE ADULT - NS ATTEND AMEND GEN_ALL_CORE FT
addendum:  upon talking to patient and wife, several syncopal episodes occurred abruptly. no prodrome or epidrom. one instanse questionable confusion  ECG 1st  ms LBBB QRSD 162 ms  Are intersted in pursuing invasive workup    will plan for EPS tomorrow + PPM/ILR depending on results  NPO midnight  avoid heparin products
seen with above,    69M h/o HTN, HLD, LBBB, CAD with cath in 2023 stent to ramus, had Echo done in 12/2024 with progressive drop in LV EF 44% and moderate AS, recent dizziness had MCOT placed found with 12 beats NSVT started on metoprolol pending elective outpatient left heart cath and MRI found with 1.5 cm pituitary macroadenoma, reports sudden altered mental state yesterday with speech impairment and also few weeks ago with blurry vision while driving, has not seen outpatient Neurology or Endocrine yet, CT negative for infarct/bleed, admitted for further workup    -he denies actual syncope, seen by Neurology pending suspect TIA pending repeat MRI  -continue telemetry monitoring, if neurological workup negative will plan for left heart cath next week to complete ischemic workup  -repeat TTE pending if further drop in LV EF with recurrent NSVT and LBBB may benefit from CRT-D otherwise can consider ILR prior to discharge  -continue low dose metoprolol limited by sinus bradycardia  -change valsartan to Entresto for GDMT      Michael Burkett DO, [facility]  Faculty Non-Invasive Cardiologist  [phone]

## 2025-06-17 ENCOUNTER — TRANSCRIPTION ENCOUNTER (OUTPATIENT)
Age: 70
End: 2025-06-17

## 2025-06-17 DIAGNOSIS — I50.32 CHRONIC DIASTOLIC (CONGESTIVE) HEART FAILURE: ICD-10-CM

## 2025-06-17 LAB
ABO RH CONFIRMATION: SIGNIFICANT CHANGE UP
ANION GAP SERPL CALC-SCNC: 10 MMOL/L — SIGNIFICANT CHANGE UP (ref 5–17)
BLD GP AB SCN SERPL QL: SIGNIFICANT CHANGE UP
BUN SERPL-MCNC: 13.2 MG/DL — SIGNIFICANT CHANGE UP (ref 8–20)
CALCIUM SERPL-MCNC: 8.2 MG/DL — LOW (ref 8.4–10.5)
CHLORIDE SERPL-SCNC: 107 MMOL/L — SIGNIFICANT CHANGE UP (ref 96–108)
CO2 SERPL-SCNC: 23 MMOL/L — SIGNIFICANT CHANGE UP (ref 22–29)
CREAT SERPL-MCNC: 0.77 MG/DL — SIGNIFICANT CHANGE UP (ref 0.5–1.3)
EGFR: 97 ML/MIN/1.73M2 — SIGNIFICANT CHANGE UP
EGFR: 97 ML/MIN/1.73M2 — SIGNIFICANT CHANGE UP
GLUCOSE BLDC GLUCOMTR-MCNC: 120 MG/DL — HIGH (ref 70–99)
GLUCOSE BLDC GLUCOMTR-MCNC: 77 MG/DL — SIGNIFICANT CHANGE UP (ref 70–99)
GLUCOSE BLDC GLUCOMTR-MCNC: 97 MG/DL — SIGNIFICANT CHANGE UP (ref 70–99)
GLUCOSE SERPL-MCNC: 95 MG/DL — SIGNIFICANT CHANGE UP (ref 70–99)
HCT VFR BLD CALC: 44.9 % — SIGNIFICANT CHANGE UP (ref 39–50)
HGB BLD-MCNC: 15.7 G/DL — SIGNIFICANT CHANGE UP (ref 13–17)
MCHC RBC-ENTMCNC: 31.5 PG — SIGNIFICANT CHANGE UP (ref 27–34)
MCHC RBC-ENTMCNC: 35 G/DL — SIGNIFICANT CHANGE UP (ref 32–36)
MCV RBC AUTO: 90.2 FL — SIGNIFICANT CHANGE UP (ref 80–100)
NRBC # BLD AUTO: 0 K/UL — SIGNIFICANT CHANGE UP (ref 0–0)
NRBC # FLD: 0 K/UL — SIGNIFICANT CHANGE UP (ref 0–0)
NRBC BLD AUTO-RTO: 0 /100 WBCS — SIGNIFICANT CHANGE UP (ref 0–0)
PLATELET # BLD AUTO: 175 K/UL — SIGNIFICANT CHANGE UP (ref 150–400)
PMV BLD: 10.7 FL — SIGNIFICANT CHANGE UP (ref 7–13)
POTASSIUM SERPL-MCNC: 3.9 MMOL/L — SIGNIFICANT CHANGE UP (ref 3.5–5.3)
POTASSIUM SERPL-SCNC: 3.9 MMOL/L — SIGNIFICANT CHANGE UP (ref 3.5–5.3)
RBC # BLD: 4.98 M/UL — SIGNIFICANT CHANGE UP (ref 4.2–5.8)
RBC # FLD: 13.2 % — SIGNIFICANT CHANGE UP (ref 10.3–14.5)
SODIUM SERPL-SCNC: 140 MMOL/L — SIGNIFICANT CHANGE UP (ref 135–145)
WBC # BLD: 7.41 K/UL — SIGNIFICANT CHANGE UP (ref 3.8–10.5)
WBC # FLD AUTO: 7.41 K/UL — SIGNIFICANT CHANGE UP (ref 3.8–10.5)

## 2025-06-17 PROCEDURE — 99233 SBSQ HOSP IP/OBS HIGH 50: CPT

## 2025-06-17 PROCEDURE — 99232 SBSQ HOSP IP/OBS MODERATE 35: CPT

## 2025-06-17 PROCEDURE — 93010 ELECTROCARDIOGRAM REPORT: CPT | Mod: 76

## 2025-06-17 PROCEDURE — 93619 COMPREHENSIVE EP EVALUATION: CPT | Mod: 26

## 2025-06-17 PROCEDURE — 33208 INSRT HEART PM ATRIAL & VENT: CPT | Mod: KX

## 2025-06-17 PROCEDURE — 99152 MOD SED SAME PHYS/QHP 5/>YRS: CPT

## 2025-06-17 PROCEDURE — 71045 X-RAY EXAM CHEST 1 VIEW: CPT | Mod: 26

## 2025-06-17 RX ORDER — CEFAZOLIN SODIUM IN 0.9 % NACL 3 G/100 ML
2000 INTRAVENOUS SOLUTION, PIGGYBACK (ML) INTRAVENOUS EVERY 8 HOURS
Refills: 0 | Status: COMPLETED | OUTPATIENT
Start: 2025-06-17 | End: 2025-06-18

## 2025-06-17 RX ORDER — PROCAINAMIDE HCL 500 MG
1000 TABLET, EXTENDED RELEASE ORAL ONCE
Refills: 0 | Status: DISCONTINUED | OUTPATIENT
Start: 2025-06-17 | End: 2025-06-19

## 2025-06-17 RX ORDER — CEFAZOLIN SODIUM IN 0.9 % NACL 3 G/100 ML
2000 INTRAVENOUS SOLUTION, PIGGYBACK (ML) INTRAVENOUS EVERY 8 HOURS
Refills: 0 | Status: DISCONTINUED | OUTPATIENT
Start: 2025-06-17 | End: 2025-06-17

## 2025-06-17 RX ORDER — SACUBITRIL AND VALSARTAN 49; 51 MG/1; MG/1
1 TABLET, FILM COATED ORAL
Qty: 60 | Refills: 0
Start: 2025-06-17 | End: 2025-07-16

## 2025-06-17 RX ADMIN — DORZOLAMIDE HYDROCHLORIDE AND TIMOLOL MALEATE 1 DROP(S): 20; 5 SOLUTION/ DROPS OPHTHALMIC at 21:02

## 2025-06-17 RX ADMIN — LATANOPROST PF 1 DROP(S): 0.05 SOLUTION/ DROPS OPHTHALMIC at 21:03

## 2025-06-17 RX ADMIN — CLOPIDOGREL BISULFATE 75 MILLIGRAM(S): 75 TABLET, FILM COATED ORAL at 17:11

## 2025-06-17 RX ADMIN — Medication 70 MILLILITER(S): at 02:49

## 2025-06-17 RX ADMIN — Medication 25 MILLIGRAM(S): at 17:11

## 2025-06-17 RX ADMIN — SACUBITRIL AND VALSARTAN 1 TABLET(S): 49; 51 TABLET, FILM COATED ORAL at 17:10

## 2025-06-17 RX ADMIN — Medication 2000 MILLIGRAM(S): at 17:10

## 2025-06-17 RX ADMIN — ATORVASTATIN CALCIUM 40 MILLIGRAM(S): 80 TABLET, FILM COATED ORAL at 21:02

## 2025-06-17 RX ADMIN — DORZOLAMIDE HYDROCHLORIDE AND TIMOLOL MALEATE 1 DROP(S): 20; 5 SOLUTION/ DROPS OPHTHALMIC at 06:45

## 2025-06-17 NOTE — DISCHARGE NOTE PROVIDER - NSDCCPTREATMENT_GEN_ALL_CORE_FT
PRINCIPAL PROCEDURE  Procedure: Insertion, cardiac pacemaker, dual chamber  Findings and Treatment: Cardiac Device Implant Post Operative Instructions  - Do not touch the incision until it is completely healed.   - There is Dermabond and/or Steristrips (white strips of tape) on your incision, which will start to peel off on their own over the next 2-3 weeks. Do not pick at or peel off the Dermabond/Steristrips.   - Bruising around the implant site or over the chest, side or arm near the incision is normal, and will take a few weeks to resolve.  -Do not lift the affected arm higher than 90 degrees (shoulder height) in any direction for 6 weeks.   - Do not push, pull or lift anything heavier than 10 lbs (about a gallon of milk) with the affected arm for 6 weeks.     - Do not apply soaps, creams, lotions, ointments or powders to the incision until it is completely healed.  - You may take a shower in 24 hours, and allow the water to run over the incision. However, do not submerge the incision in water: do not swim or soak in bath tubs, hot tubs, swimming pools, etc.   You should call the doctor if:   - You notice redness, drainage, swelling, increased tenderness, hot sensation around the incision, bleeding or incision edges pulling apart.  - Your temperature is greater than 100 degrees F for more than 24 hours.  - You notice swelling or bulging at the incision or around the device that was not there when you left the hospital or is increasing in size.  - You experience increased difficulty breathing.  - You notice new/worsening swelling in your legs and ankles.  - You faint or have dizzy spells.  - You have any questions or concerns regarding your device or the procedure.

## 2025-06-17 NOTE — DISCHARGE NOTE PROVIDER - NSDCFUSCHEDAPPT_GEN_ALL_CORE_FT
Luis F Hernandes  [facility] Physician [facility]  CARDIOLOGY [address] [facility]  Scheduled Appointment: 06/26/2025

## 2025-06-17 NOTE — PROGRESS NOTE ADULT - SUBJECTIVE AND OBJECTIVE BOX
Patient is a 69y old  Male who presents with a chief complaint of Pituitary adenoma (16 Jun 2025 11:08)      No acute issues  REVIEW OF SYSTEMS: All systems are reviewed and found to be negative except above    MEDICATIONS  (STANDING):  atorvastatin 40 milliGRAM(s) Oral at bedtime  ceFAZolin  Injectable. 2000 milliGRAM(s) IV Push every 8 hours  clopidogrel Tablet 75 milliGRAM(s) Oral daily  dextrose 5%. 1000 milliLiter(s) (50 mL/Hr) IV Continuous <Continuous>  dextrose 5%. 1000 milliLiter(s) (100 mL/Hr) IV Continuous <Continuous>  dextrose 50% Injectable 25 Gram(s) IV Push once  dextrose 50% Injectable 12.5 Gram(s) IV Push once  dextrose 50% Injectable 25 Gram(s) IV Push once  dorzolamide 2%/timolol 0.5% Ophthalmic Solution 1 Drop(s) Both EYES two times a day  glucagon  Injectable 1 milliGRAM(s) IntraMuscular once  hydrALAZINE 25 milliGRAM(s) Oral two times a day  insulin lispro (ADMELOG) corrective regimen sliding scale   SubCutaneous three times a day before meals  insulin lispro (ADMELOG) corrective regimen sliding scale   SubCutaneous at bedtime  latanoprost 0.005% Ophthalmic Solution 1 Drop(s) Both EYES at bedtime  procainamide   IVPB 1000 milliGRAM(s) IV Bolus once  sacubitril 24 mG/valsartan 26 mG 1 Tablet(s) Oral two times a day    MEDICATIONS  (PRN):  acetaminophen     Tablet .. 650 milliGRAM(s) Oral every 6 hours PRN Temp greater or equal to 38C (100.4F), Mild Pain (1 - 3)  aluminum hydroxide/magnesium hydroxide/simethicone Suspension 30 milliLiter(s) Oral every 4 hours PRN Dyspepsia  atropine Injectable 0.5 milliGRAM(s) IV Push once PRN HR <30 for >5min or if becomes symptomatic  dextrose Oral Gel 15 Gram(s) Oral once PRN Blood Glucose LESS THAN 70 milliGRAM(s)/deciliter  melatonin 3 milliGRAM(s) Oral at bedtime PRN Insomnia  ondansetron Injectable 4 milliGRAM(s) IV Push every 8 hours PRN Nausea and/or Vomiting  oxycodone    5 mG/acetaminophen 325 mG 1 Tablet(s) Oral every 6 hours PRN Severe Pain (7 - 10)      CAPILLARY BLOOD GLUCOSE      POCT Blood Glucose.: 97 mg/dL (17 Jun 2025 08:19)  POCT Blood Glucose.: 114 mg/dL (16 Jun 2025 21:44)  POCT Blood Glucose.: 98 mg/dL (16 Jun 2025 17:31)    I&O's Summary    16 Jun 2025 07:01  -  17 Jun 2025 07:00  --------------------------------------------------------  IN: 1470 mL / OUT: 1650 mL / NET: -180 mL        PHYSICAL EXAM:  Vital Signs Last 24 Hrs  T(C): 36.7 (17 Jun 2025 15:03), Max: 36.7 (16 Jun 2025 17:32)  T(F): 98 (17 Jun 2025 15:03), Max: 98 (16 Jun 2025 17:32)  HR: 60 (17 Jun 2025 15:03) (48 - 60)  BP: 140/68 (17 Jun 2025 15:03) (126/78 - 167/75)  BP(mean): --  RR: 15 (17 Jun 2025 15:03) (15 - 18)  SpO2: 95% (17 Jun 2025 15:03) (95% - 99%)    Parameters below as of 17 Jun 2025 15:03  Patient On (Oxygen Delivery Method): room air        CONSTITUTIONAL: NAD,  EYES: PERRLA; conjunctiva and sclera clear  ENMT: Moist oral mucosa,   RESPIRATORY: Normal respiratory effort; lungs are clear to auscultation bilaterally  CARDIOVASCULAR:, normal S1 and S2, no murmur   EXTS: No lower extremity edema; Peripheral pulses are 2+ bilaterally  ABDOMEN: Nontender to palpation, normoactive bowel sounds, no rebound/guarding;   MUSCLOSKELETAL:   no joint swelling or tenderness to palpation  PSYCH: affect appropriate  NEUROLOGY: A+O to person, place, and time; CN 2-12 are intact and symmetric; no gross sensory deficits;       LABS:                        15.7   7.41  )-----------( 175      ( 17 Jun 2025 04:58 )             44.9     06-17    140  |  107  |  13.2  ----------------------------<  95  3.9   |  23.0  |  0.77    Ca    8.2[L]      17 Jun 2025 04:58  Phos  3.3     06-16  Mg     2.0     06-16            Urinalysis Basic - ( 17 Jun 2025 04:58 )    Color: x / Appearance: x / SG: x / pH: x  Gluc: 95 mg/dL / Ketone: x  / Bili: x / Urobili: x   Blood: x / Protein: x / Nitrite: x   Leuk Esterase: x / RBC: x / WBC x   Sq Epi: x / Non Sq Epi: x / Bacteria: x          RADIOLOGY & ADDITIONAL TESTS:  Results Reviewed:   Imaging Personally Reviewed:  Electrocardiogram Personally Reviewed:    COORDINATION OF CARE:  Care Discussed with Consultants/Other Providers [Y/N]:  Prior or Outpatient Records Reviewed [Y/N]:   Patient is a 69y old  Male who presents with a chief complaint of Pituitary adenoma (16 Jun 2025 11:08)      No acute issues  denies any cp,sob  REVIEW OF SYSTEMS: All systems are reviewed and found to be negative except above    MEDICATIONS  (STANDING):  atorvastatin 40 milliGRAM(s) Oral at bedtime  ceFAZolin  Injectable. 2000 milliGRAM(s) IV Push every 8 hours  clopidogrel Tablet 75 milliGRAM(s) Oral daily  dextrose 5%. 1000 milliLiter(s) (50 mL/Hr) IV Continuous <Continuous>  dextrose 5%. 1000 milliLiter(s) (100 mL/Hr) IV Continuous <Continuous>  dextrose 50% Injectable 25 Gram(s) IV Push once  dextrose 50% Injectable 12.5 Gram(s) IV Push once  dextrose 50% Injectable 25 Gram(s) IV Push once  dorzolamide 2%/timolol 0.5% Ophthalmic Solution 1 Drop(s) Both EYES two times a day  glucagon  Injectable 1 milliGRAM(s) IntraMuscular once  hydrALAZINE 25 milliGRAM(s) Oral two times a day  insulin lispro (ADMELOG) corrective regimen sliding scale   SubCutaneous three times a day before meals  insulin lispro (ADMELOG) corrective regimen sliding scale   SubCutaneous at bedtime  latanoprost 0.005% Ophthalmic Solution 1 Drop(s) Both EYES at bedtime  procainamide   IVPB 1000 milliGRAM(s) IV Bolus once  sacubitril 24 mG/valsartan 26 mG 1 Tablet(s) Oral two times a day    MEDICATIONS  (PRN):  acetaminophen     Tablet .. 650 milliGRAM(s) Oral every 6 hours PRN Temp greater or equal to 38C (100.4F), Mild Pain (1 - 3)  aluminum hydroxide/magnesium hydroxide/simethicone Suspension 30 milliLiter(s) Oral every 4 hours PRN Dyspepsia  atropine Injectable 0.5 milliGRAM(s) IV Push once PRN HR <30 for >5min or if becomes symptomatic  dextrose Oral Gel 15 Gram(s) Oral once PRN Blood Glucose LESS THAN 70 milliGRAM(s)/deciliter  melatonin 3 milliGRAM(s) Oral at bedtime PRN Insomnia  ondansetron Injectable 4 milliGRAM(s) IV Push every 8 hours PRN Nausea and/or Vomiting  oxycodone    5 mG/acetaminophen 325 mG 1 Tablet(s) Oral every 6 hours PRN Severe Pain (7 - 10)      CAPILLARY BLOOD GLUCOSE      POCT Blood Glucose.: 97 mg/dL (17 Jun 2025 08:19)  POCT Blood Glucose.: 114 mg/dL (16 Jun 2025 21:44)  POCT Blood Glucose.: 98 mg/dL (16 Jun 2025 17:31)    I&O's Summary    16 Jun 2025 07:01  -  17 Jun 2025 07:00  --------------------------------------------------------  IN: 1470 mL / OUT: 1650 mL / NET: -180 mL        PHYSICAL EXAM:  Vital Signs Last 24 Hrs  T(C): 36.7 (17 Jun 2025 15:03), Max: 36.7 (16 Jun 2025 17:32)  T(F): 98 (17 Jun 2025 15:03), Max: 98 (16 Jun 2025 17:32)  HR: 60 (17 Jun 2025 15:03) (48 - 60)  BP: 140/68 (17 Jun 2025 15:03) (126/78 - 167/75)  BP(mean): --  RR: 15 (17 Jun 2025 15:03) (15 - 18)  SpO2: 95% (17 Jun 2025 15:03) (95% - 99%)    Parameters below as of 17 Jun 2025 15:03  Patient On (Oxygen Delivery Method): room air        CONSTITUTIONAL: NAD,  EYES: PERRLA; conjunctiva and sclera clear  ENMT: Moist oral mucosa,   RESPIRATORY: Normal respiratory effort; lungs are clear to auscultation bilaterally. dressing L Upper chest  CARDIOVASCULAR:, normal S1 and S2, no murmur   EXTS: No lower extremity edema; Peripheral pulses are 2+ bilaterally  ABDOMEN: Nontender to palpation, normoactive bowel sounds, no rebound/guarding;   MUSCLOSKELETAL:   no joint swelling or tenderness to palpation  PSYCH: affect appropriate  NEUROLOGY: A+O to person, place, and time; CN 2-12 are intact and symmetric; no gross sensory deficits;       LABS:                        15.7   7.41  )-----------( 175      ( 17 Jun 2025 04:58 )             44.9     06-17    140  |  107  |  13.2  ----------------------------<  95  3.9   |  23.0  |  0.77    Ca    8.2[L]      17 Jun 2025 04:58  Phos  3.3     06-16  Mg     2.0     06-16            Urinalysis Basic - ( 17 Jun 2025 04:58 )    Color: x / Appearance: x / SG: x / pH: x  Gluc: 95 mg/dL / Ketone: x  / Bili: x / Urobili: x   Blood: x / Protein: x / Nitrite: x   Leuk Esterase: x / RBC: x / WBC x   Sq Epi: x / Non Sq Epi: x / Bacteria: x          RADIOLOGY & ADDITIONAL TESTS:  Results Reviewed:   Imaging Personally Reviewed:  Electrocardiogram Personally Reviewed:    COORDINATION OF CARE:  Care Discussed with Consultants/Other Providers [Y/N]:  Prior or Outpatient Records Reviewed [Y/N]:

## 2025-06-17 NOTE — PROGRESS NOTE ADULT - ASSESSMENT
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  Now status post EPS via RFV access which showed prolonged HV interval, and MDT 2ch PPM implant (left bundle pacing).    2) NSVT.  no sym,toms as of now.  prior stress test was normal.  will defer any testing  in [facility]. will contineu to monitor.

## 2025-06-17 NOTE — DISCHARGE NOTE PROVIDER - CARE PROVIDER_API CALL
Marcel Cramer  Clinical Cardiac Electrophysiology  [address] [facility], [address]  [place] [address]  Phone: [phone]  Fax: [phone]  Follow Up Time: 2 weeks   Spencer Vazquez  Clinical Cardiac Electrophysiology  [address] [facility], [address]  [place] [address]  Phone: [phone]  Fax: [phone]  Follow Up Time:    Spencer Vazquez  Clinical Cardiac Electrophysiology  [address] [facility], [address] [place] [address]  Phone: [phone]  Fax: [phone]  Follow Up Time:     Luis F Hernandes  Cardiovascular Disease  [address] [facility], [address] [address]  Phone: [phone]  Fax: [phone]  Follow Up Time: 1 week

## 2025-06-17 NOTE — DISCHARGE NOTE PROVIDER - ATTENDING DISCHARGE PHYSICAL EXAMINATION:
T(C): 36.4 (06-17-25 @ 08:06), Max: 36.7 (06-16-25 @ 17:32)  HR: 48 (06-17-25 @ 08:06) (48 - 72)  BP: 142/90 (06-17-25 @ 08:06) (139/62 - 167/75)  RR: 16 (06-17-25 @ 08:06) (16 - 18)  SpO2: 96% (06-17-25 @ 08:06) (95% - 99%)    GEN - NAD  HEENT - NCAT, EOMI, LASHAUN,   RESP - CTA BL, no wheeze/stridor/rhonchi/crackles.   CARDIO - NS1S2, RRR. No murmurs  ABD - Soft/Non tender/Non distended. Normal BS x4 quadrants.   Ext - No SHANDA.  MSK - full ROM of BL upper and lower extremities without pain or restriction. BL 5/5 strength on upper and lower extremities.   Neuro - cn 2-12 grossly intact.. no visible seizure activity appreciated. no tremor. gait not observed.    Psych- AAOx3.  attentive. normal affect. T(C): 36.4 (06-19-25 @ 08:45), Max: 36.7 (06-18-25 @ 21:15)  HR: 60 (06-19-25 @ 08:45) (59 - 63)  BP: 124/75 (06-19-25 @ 08:45) (124/75 - 174/97)  RR: 18 (06-19-25 @ 08:45) (18 - 18)  SpO2: 97% (06-19-25 @ 08:45) (94% - 97%)          GEN - NAD  HEENT - NCAT, EOMI, LASHAUN,   RESP - CTA BL, no wheeze/stridor/rhonchi/crackles.   CARDIO - NS1S2,  No murmurs  ABD - Soft/Non tender/Non distended. Normal BS x4 quadrants.   Ext - No SHANDA.  MSK - full ROM of BL upper and lower extremities without pain or restriction. BL 5/5 strength on upper and lower extremities.   Neuro - cn 2-12 grossly intact.. no visible seizure activity appreciated. no tremor. gait not observed.    Psych- AAOx3.  attentive. normal affect.

## 2025-06-17 NOTE — DISCHARGE NOTE PROVIDER - NSDCHC_MEDRECSTATUS_GEN_ALL_CORE
Admission Reconciliation is Completed  Discharge Reconciliation is Not Complete Admission Reconciliation is Completed  Discharge Reconciliation is Completed 2.356

## 2025-06-17 NOTE — DISCHARGE NOTE PROVIDER - NSDCCPCAREPLAN_GEN_ALL_CORE_FT
PRINCIPAL DISCHARGE DIAGNOSIS  Diagnosis: Bradycardia  Assessment and Plan of Treatment:       SECONDARY DISCHARGE DIAGNOSES  Diagnosis: Near syncope  Assessment and Plan of Treatment:     Diagnosis: CAD S/P percutaneous coronary angioplasty  Assessment and Plan of Treatment:     Diagnosis: HLD (hyperlipidemia)  Assessment and Plan of Treatment:     Diagnosis: NSVT (nonsustained ventricular tachycardia)  Assessment and Plan of Treatment:     Diagnosis: AMS (altered mental status)  Assessment and Plan of Treatment:

## 2025-06-17 NOTE — PROGRESS NOTE ADULT - SUBJECTIVE AND OBJECTIVE BOX
[facility]                                                         CARDIOLOGY AT [facility]                                                                  [address] [facility], [place] [address]                                                         Telephone: [phone]. Fax:[phone]                                                                             PROGRESS NOTE    Reason for follow up:  NSVT and dizziness and abnormal holter monitor and  bradycardia    Update: patient had a EP study done that showed     Review of symptoms:   Cardiac:  No chest pain. No dyspnea. No palpitations.  Respiratory: no cough. No dyspnea  Gastrointestinal: No diarrhea. No abdominal pain. No bleeding.   Neuro: No focal neuro complaints.  All other ROS negative unless otherwise listed above    PHYSICAL EXAM:  Appearance: Comfortable. No acute distress  HEENT:  Atraumatic. Normocephalic.  Normal oral mucosa  Neurologic: A & O x 3, no gross focal deficits.  Cardiovascular: RRR S1 S2, No murmur, no rubs/gallops. No JVD  Respiratory: Lungs clear to auscultation, unlabored   Gastrointestinal:  Soft, Non-tender, + BS  Lower Extremities: 2+ Peripheral Pulses, No clubbing, cyanosis, or edema  Psychiatry: Patient is calm. No agitation.   Skin: warm and dry.    Vitals:    reviewed.   C       CURRENT OTHER MEDICATIONS:  acetaminophen     Tablet .. 650 milliGRAM(s) Oral every 6 hours PRN Temp greater or equal to 38C (100.4F), Mild Pain (1 - 3)  melatonin 3 milliGRAM(s) Oral at bedtime PRN Insomnia  ondansetron Injectable 4 milliGRAM(s) IV Push every 8 hours PRN Nausea and/or Vomiting  aluminum hydroxide/magnesium hydroxide/simethicone Suspension 30 milliLiter(s) Oral every 4 hours PRN Dyspepsia  atorvastatin 40 milliGRAM(s) Oral at bedtime  dextrose 50% Injectable 25 Gram(s) IV Push once, Stop order after: 1 Doses  dextrose 50% Injectable 12.5 Gram(s) IV Push once, Stop order after: 1 Doses  dextrose 50% Injectable 25 Gram(s) IV Push once, Stop order after: 1 Doses  dextrose Oral Gel 15 Gram(s) Oral once, Stop order after: 1 Doses PRN Blood Glucose LESS THAN 70 milliGRAM(s)/deciliter  glucagon  Injectable 1 milliGRAM(s) IntraMuscular once, Stop order after: 1 Doses  insulin lispro (ADMELOG) corrective regimen sliding scale   SubCutaneous three times a day before meals  insulin lispro (ADMELOG) corrective regimen sliding scale   SubCutaneous at bedtime  clopidogrel Tablet 75 milliGRAM(s) Oral daily  dextrose 5%. 1000 milliLiter(s) (50 mL/Hr) IV Continuous <Continuous>  dextrose 5%. 1000 milliLiter(s) (100 mL/Hr) IV Continuous <Continuous>  dorzolamide 2%/timolol 0.5% Ophthalmic Solution 1 Drop(s) Both EYES two times a day  enoxaparin Injectable 40 milliGRAM(s) SubCutaneous every 24 hours  latanoprost 0.005% Ophthalmic Solution 1 Drop(s) Both EYES at bedtime  magnesium sulfate  IVPB 1 Gram(s) IV Intermittent once, Stop order after: 1 Doses  sodium chloride 0.9%. 1000 milliLiter(s) (125 mL/Hr) IV Continuous <Continuous>      LABS:	 	       reviewed   Lipid Profile: Date: 06-14 @ 02:46  Total cholesterol 108; Direct LDL: --; HDL: 29; Triglycerides:125    HgA1c:   TSH: Thyroid Stimulating Hormone, Serum: 1.90 uIU/mL

## 2025-06-17 NOTE — DISCHARGE NOTE PROVIDER - NSDCFUADDAPPT_GEN_ALL_CORE_FT
APPTS ARE READY TO BE MADE: [X] YES    Best Family or Patient Contact (if needed):    Additional Information about above appointments (if needed):    1: PCP ONE WEEK  2:   3:     Other comments or requests:    APPTS ARE READY TO BE MADE: [X] YES    Best Family or Patient Contact (if needed):    Additional Information about above appointments (if needed):    1: PCP ONE WEEK  2:   3:     Other comments or requests:     Patient was outreached but did not answer. A voicemail was left for the patient to return our call.

## 2025-06-17 NOTE — DISCHARGE NOTE PROVIDER - PROVIDER TOKENS
PROVIDER:[TOKEN:[[id number]],FOLLOWUP:[2 weeks]] PROVIDER:[TOKEN:[[id number]]] PROVIDER:[TOKEN:[[id number]]],PROVIDER:[TOKEN:[[id number]],FOLLOWUP:[1 week]]

## 2025-06-17 NOTE — DISCHARGE NOTE PROVIDER - HOSPITAL COURSE
69y/oM PMH CAD, HTN, HLD, DM, intermittent LBBB, pituitary macroadenoma, current smoker presenting to ER with confusion. Pt also with recent episodes near-syncope with blurry and "closing in" vision, which resolved after a couple minutes, has MCOT in place. Pt admitted  near syncope,ams,sinus bradycardia,NSVT for further work up.MRI brain no acute change.TTE stable ef. Seen by neurology,cardiology and EP. mri brain,cth No acute chnaged. cleared by neurology to discharge.F/U out pt. BB DC by EP.rec continue MCOT. F/U OUT PT . tte stable.Orth bp stable. F/U pulmonary out pt for sleep study .    CTH: No ICH, mass effect or infarct, +mild chronic ischemic changes in white matter ; +unchanged appearance of pituitary macroadenoma with suprasellar extension abutting optic chiasm ; +intracranial carotid arteries   CTA Head/neck: no evidence significant stenosis or occlusion, no LVO, significant stenosis or vascular abnormality   CT perfusion not post-processed, could not be evaluated   MRI BRAIN Unchanged sellar/suprasellar lesion with internal proteinaceous or hemorrhagic contents. Differential diagnosis includes   but is not limited to a macroadenoma or complex Rathke cleft cyst versus other lesion.   69y/oM PMH CAD, HTN, HLD, DM, intermittent LBBB, pituitary macroadenoma, current smoker presenting to ER with confusion. Pt also with recent episodes near-syncope with blurry and "closing in" vision, which resolved after a couple minutes, has MCOT in place. Pt admitted  near syncope,ams,sinus bradycardia,NSVT for further work up.MRI brain no acute change.TTE stable ef. Seen by neurology,cardiology and EP. mri brain,cth No acute chnaged. cleared by neurology to discharge.F/U out pt. s/p PPM implant by EP.Resume BB. Pt had episode of dizziness and NSVT next day post op during cxr.seen by cardiology team,Cardiac ct done,reviewed by . cleared to discharge home and f/u out pt.    CTH: No ICH, mass effect or infarct, +mild chronic ischemic changes in white matter ; +unchanged appearance of pituitary macroadenoma with suprasellar extension abutting optic chiasm ; +intracranial carotid arteries   CTA Head/neck: no evidence significant stenosis or occlusion, no LVO, significant stenosis or vascular abnormality   CT perfusion not post-processed, could not be evaluated   MRI BRAIN Unchanged sellar/suprasellar lesion with internal proteinaceous or hemorrhagic contents. Differential diagnosis includes   but is not limited to a macroadenoma or complex Rathke cleft cyst versus other lesion.

## 2025-06-17 NOTE — DISCHARGE NOTE PROVIDER - NSDCMRMEDTOKEN_GEN_ALL_CORE_FT
atorvastatin 40 mg oral tablet: 1 tab(s) orally once a day (at bedtime)  clopidogrel 75 mg oral tablet: 1 tab(s) orally once a day  dorzolamide hydrochloride-timolol maleate 2.23%-0.68% (2%-0.5% base) preservative-free ophth sol: 1 drop(s) in each eye 2 times a day  hydrALAZINE 25 mg oral tablet: 1 tab(s) orally 2 times a day  Jardiance 10 mg oral tablet: 1 orally  latanoprost 0.005% ophthalmic solution: 1 drop(s) in each eye 2 times a day  metoprolol succinate 25 mg oral tablet, extended release: 0.5 tab(s) orally once a day  sacubitril-valsartan 24 mg-26 mg oral tablet: 1 tab(s) orally 2 times a day   atorvastatin 40 mg oral tablet: 1 tab(s) orally once a day (at bedtime)  clopidogrel 75 mg oral tablet: 1 tab(s) orally once a day  dorzolamide hydrochloride-timolol maleate 2.23%-0.68% (2%-0.5% base) preservative-free ophth sol: 1 drop(s) in each eye 2 times a day  hydrALAZINE 25 mg oral tablet: 1 tab(s) orally 2 times a day  Jardiance 10 mg oral tablet: 1 orally  latanoprost 0.005% ophthalmic solution: 1 drop(s) in each eye 2 times a day  metoprolol succinate 25 mg oral tablet, extended release: 1 tab(s) orally once a day  sacubitril-valsartan 24 mg-26 mg oral tablet: 1 tab(s) orally 2 times a day

## 2025-06-17 NOTE — DISCHARGE NOTE PROVIDER - CARE PROVIDERS DIRECT ADDRESSES
,[email or web address] ,DirectAddress_Unknown ,DirectAddress_Unknown,[email or web address]

## 2025-06-17 NOTE — PROGRESS NOTE ADULT - ASSESSMENT
69y/oM PMH CAD, HTN, HLD, DM, intermittent LBBB, pituitary macroadenoma, current smoker presenting to ER with confusion. Pt also with recent episodes near-syncope with blurry and "closing in" vision, which resolved after a couple minutes, has MCOT in place. Pt admitted for further work up.MRI brain no acute change.TTE stable ef.     AMS   Near syncope   Sinus  bradycardia  NSVT   - tele   - He has been wearing a 30 day MCOT which demonstrated PVCs and 12 beats of NSVT on 6/2/25. Now status post EPS via RFV access which showed prolonged HV interval, and MDT s/p  PPM implant (left bundle pacing).  -CTH: No ICH, mass effect or infarct, +mild chronic ischemic changes in white matter ; +unchanged appearance of pituitary macroadenoma with suprasellar extension abutting optic chiasm ; +intracranial carotid arteries   -CTA Head/neck: no evidence significant stenosis or occlusion, no LVO, significant stenosis or vascular abnormality   -CT perfusion not post-processed, could not be evaluated   -MRI BRAIN Unchanged sellar/suprasellar lesion with internal proteinaceous or hemorrhagic contents. Differential diagnosis includes   but is not limited to a macroadenoma or complex Rathke cleft cyst versus other lesion.  -EEG no acute seizure  -stable orthostatics   -trops flat  -seen by cardio   -f/u neuro rec  -TTE  ef 55-60%  - f/u cardiology rec.   -no further work up per neurology  -f/u further ep rec    CAD s/p PCI   HTN, HLD  -monitor on tele   - home meds: metoprolol succinate 12.5mg qd, atorvastatin 40mg qd, valsartan-hctz 40/12.5mg (0.5 tab qd), plavix 75mg qd  -pt reports only taking plavix currently, no aspirin     DM   -hold home Jardiance  -HbA1c 5.8  -ISS     Glaucoma   -cont eye gtts     Tobacco dependence   -cessation advised   -declines NRT     vte ppx: lovenox sq     Disposition: pending. dc place tomorrow  Plan of care dw pt  69y/oM PMH CAD, HTN, HLD, DM, intermittent LBBB, pituitary macroadenoma, current smoker presenting to ER with confusion. Pt also with recent episodes near-syncope with blurry and "closing in" vision, which resolved after a couple minutes, has MCOT in place. Pt admitted for further work up.MRI brain no acute change.TTE stable ef.     AMS   Near syncope   Sinus  bradycardia  NSVT   - tele   - He has been wearing a 30 day MCOT which demonstrated PVCs and 12 beats of NSVT on 6/2/25. Now status post EPS via RFV access which showed prolonged HV interval, and MDT s/p  PPM implant (left bundle pacing).  -CTH: No ICH, mass effect or infarct, +mild chronic ischemic changes in white matter ; +unchanged appearance of pituitary macroadenoma with suprasellar extension abutting optic chiasm ; +intracranial carotid arteries   -CTA Head/neck: no evidence significant stenosis or occlusion, no LVO, significant stenosis or vascular abnormality   -CT perfusion not post-processed, could not be evaluated   -MRI BRAIN Unchanged sellar/suprasellar lesion with internal proteinaceous or hemorrhagic contents. Differential diagnosis includes   but is not limited to a macroadenoma or complex Rathke cleft cyst versus other lesion.  -EEG no acute seizure  -stable orthostatics   -trops flat  -seen by cardio   -f/u neuro rec  -TTE  ef 55-60%  - f/u cardiology rec.   -no further work up per neurology  -f/u further ep rec    CAD s/p PCI   HTN, HLD  -monitor on tele   - home meds: metoprolol succinate 12.5mg qd, atorvastatin 40mg qd, valsartan-hctz 40/12.5mg (0.5 tab qd), plavix 75mg qd  -pt reports only taking plavix currently, no aspirin     DM   -hold home Jardiance  -HbA1c 5.8  -ISS     Glaucoma   -cont eye gtts     Tobacco dependence   -cessation advised   -declines NRT     vte ppx: lovenox sq     Disposition: pending. dc plan  tomorrow if clear by EP  Plan of care dw pt

## 2025-06-17 NOTE — DISCHARGE NOTE PROVIDER - NSDCFUADDINST_GEN_ALL_CORE_FT
Follow up with Dr. Vazquez in 3-4 weeks. Our office will contact you in 3-5 days to schedule this appointment. Please call [phone] with questions or concerns.  There is an Aquacel Ag surgical dressing over the incision site. It is waterproof and can be worn in the shower. This dressing should be removed in 7 days.    Follow up with Dr. Vzaquez in 3-4 weeks. Our office will contact you in 3-5 days to schedule this appointment. Please call [phone] with questions or concerns.  There is an Aquacel Ag surgical dressing over the incision site. It is waterproof and can be worn in the shower. This dressing should be removed in 7 days.   Donot lift>9lbs further rec f/u with EP

## 2025-06-17 NOTE — PROGRESS NOTE ADULT - SUBJECTIVE AND OBJECTIVE BOX
PROCEDURE(S): EPS and MDT PPM implant   ELECTROPHYSIOLOGIST(S): Spencer Vazquez MD        COMPLICATIONS:  none            CONDITION: stable    Pt doing well s/p EPS via RFV access which showed prolonged HV interval, and MDT 2ch PPM implant (left bundle pacing) via left axillary stick. Sleepy from anesthesia but denies complaint.   DDD  bpm     MEDICATIONS  (STANDING):  atorvastatin 40 milliGRAM(s) Oral at bedtime  ceFAZolin   IVPB 2000 milliGRAM(s) IV Intermittent every 8 hours  clopidogrel Tablet 75 milliGRAM(s) Oral daily  dextrose 5%. 1000 milliLiter(s) (50 mL/Hr) IV Continuous <Continuous>  dextrose 5%. 1000 milliLiter(s) (100 mL/Hr) IV Continuous <Continuous>  dextrose 50% Injectable 25 Gram(s) IV Push once  dextrose 50% Injectable 12.5 Gram(s) IV Push once  dextrose 50% Injectable 25 Gram(s) IV Push once  dorzolamide 2%/timolol 0.5% Ophthalmic Solution 1 Drop(s) Both EYES two times a day  glucagon  Injectable 1 milliGRAM(s) IntraMuscular once  hydrALAZINE 25 milliGRAM(s) Oral two times a day  insulin lispro (ADMELOG) corrective regimen sliding scale   SubCutaneous three times a day before meals  insulin lispro (ADMELOG) corrective regimen sliding scale   SubCutaneous at bedtime  latanoprost 0.005% Ophthalmic Solution 1 Drop(s) Both EYES at bedtime  procainamide   IVPB 1000 milliGRAM(s) IV Bolus once  sacubitril 24 mG/valsartan 26 mG 1 Tablet(s) Oral two times a day    MEDICATIONS  (PRN):  acetaminophen     Tablet .. 650 milliGRAM(s) Oral every 6 hours PRN Temp greater or equal to 38C (100.4F), Mild Pain (1 - 3)  aluminum hydroxide/magnesium hydroxide/simethicone Suspension 30 milliLiter(s) Oral every 4 hours PRN Dyspepsia  atropine Injectable 0.5 milliGRAM(s) IV Push once PRN HR <30 for >5min or if becomes symptomatic  dextrose Oral Gel 15 Gram(s) Oral once PRN Blood Glucose LESS THAN 70 milliGRAM(s)/deciliter  melatonin 3 milliGRAM(s) Oral at bedtime PRN Insomnia  ondansetron Injectable 4 milliGRAM(s) IV Push every 8 hours PRN Nausea and/or Vomiting  oxycodone    5 mG/acetaminophen 325 mG 1 Tablet(s) Oral every 6 hours PRN Severe Pain (7 - 10)    Allergies:  No Known Allergies    T(C): 36.4 (06-17-25 @ 08:06), Max: 36.7 (06-16-25 @ 17:32)  HR: 50 (06-17-25 @ 13:45) (48 - 57)  BP: 126/81 (06-17-25 @ 13:45) (126/78 - 167/75)  RR: 16 (06-17-25 @ 13:45) (16 - 18)  SpO2: 96% (06-17-25 @ 13:45) (95% - 99%)  Post-procedure VS: /76 HR 50 O2 sat 94% RR 15     Physical exam:   awake, alert, no obvious distress   Card: S1/S2, RRR, no m/g/r  LACW: + Aquacel and pressing dressing in place. C/D/I w/ no evidence of hematoma or swelling.   Resp: lungs CTA b/l  Abd: S/NT/ND  Groin (right): hemostatic sutures in place; sites C/D/I; no bleeding, hematoma, erythema, exudate or edema  Ext: no edema; distal pulses intact  Skin: warm, dry, no rash     ECG: A paced 50bpm     Assessment:   69 year old male, current smoker, with HTN, hyperlipidemia, DM type 2, CAD s/p PRAKASH to ramus 4/5/23 (LM, LAD and RCA with no obstruction), pituitary macroadenoma, and known LBBB who presented to [facility] ED with near syncope and confusion. He reports intermittent episodes of dizziness and near syncope over the past year. He has been wearing a 30 day MCOT which demonstrated PVCs and 12 beats of NSVT on 6/2/25. Now status post EPS via RFV access which showed prolonged HV interval, and MDT 2ch PPM implant (left bundle pacing).      Plan:   - Bedrest x 4 hours, then OOB with assistance and progress as tolerated.   - Groin sutures to be removed by EP service in AM.   - Port CXR now - r/o PTX or effusion, verify lead locations.   - Continued observation on telemetry overnight.   - Cont Ancef 2gm IV q 8 hours x 2 additional doses to complete 24 hour course.   - Pain control with PO analgesia PRN.   - NO HEPARIN OR LOVENOX, INCLUDING PROPHYLACTIC/SUBCUT DOSING, UNTIL OTHERWISE ADVISED BY EP.   - EKG, Labs, PA/Lat CXR and device check in AM.   - Please encourage incentive spirometry and ambulation once able.  - Recommend outpatient polysomnography.   - Can resume low dose beta blocker.   - There is an Aquacel Ag surgical dressing over the incision site. It is waterproof and can be worn in the shower. This dressing should be removed in 7 days.   - Will follow.

## 2025-06-18 LAB
ANION GAP SERPL CALC-SCNC: 13 MMOL/L — SIGNIFICANT CHANGE UP (ref 5–17)
BUN SERPL-MCNC: 11.7 MG/DL — SIGNIFICANT CHANGE UP (ref 8–20)
CALCIUM SERPL-MCNC: 8.8 MG/DL — SIGNIFICANT CHANGE UP (ref 8.4–10.5)
CHLORIDE SERPL-SCNC: 107 MMOL/L — SIGNIFICANT CHANGE UP (ref 96–108)
CO2 SERPL-SCNC: 25 MMOL/L — SIGNIFICANT CHANGE UP (ref 22–29)
CREAT SERPL-MCNC: 0.89 MG/DL — SIGNIFICANT CHANGE UP (ref 0.5–1.3)
EGFR: 93 ML/MIN/1.73M2 — SIGNIFICANT CHANGE UP
EGFR: 93 ML/MIN/1.73M2 — SIGNIFICANT CHANGE UP
GLUCOSE BLDC GLUCOMTR-MCNC: 103 MG/DL — HIGH (ref 70–99)
GLUCOSE BLDC GLUCOMTR-MCNC: 104 MG/DL — HIGH (ref 70–99)
GLUCOSE BLDC GLUCOMTR-MCNC: 164 MG/DL — HIGH (ref 70–99)
GLUCOSE BLDC GLUCOMTR-MCNC: 78 MG/DL — SIGNIFICANT CHANGE UP (ref 70–99)
GLUCOSE SERPL-MCNC: 84 MG/DL — SIGNIFICANT CHANGE UP (ref 70–99)
HCT VFR BLD CALC: 49.9 % — SIGNIFICANT CHANGE UP (ref 39–50)
HGB BLD-MCNC: 17.4 G/DL — HIGH (ref 13–17)
MAGNESIUM SERPL-MCNC: 1.9 MG/DL — SIGNIFICANT CHANGE UP (ref 1.6–2.6)
MCHC RBC-ENTMCNC: 31.5 PG — SIGNIFICANT CHANGE UP (ref 27–34)
MCHC RBC-ENTMCNC: 34.9 G/DL — SIGNIFICANT CHANGE UP (ref 32–36)
MCV RBC AUTO: 90.2 FL — SIGNIFICANT CHANGE UP (ref 80–100)
NRBC # BLD AUTO: 0 K/UL — SIGNIFICANT CHANGE UP (ref 0–0)
NRBC # FLD: 0 K/UL — SIGNIFICANT CHANGE UP (ref 0–0)
NRBC BLD AUTO-RTO: 0 /100 WBCS — SIGNIFICANT CHANGE UP (ref 0–0)
PLATELET # BLD AUTO: 178 K/UL — SIGNIFICANT CHANGE UP (ref 150–400)
PMV BLD: 10.8 FL — SIGNIFICANT CHANGE UP (ref 7–13)
POTASSIUM SERPL-MCNC: 4.2 MMOL/L — SIGNIFICANT CHANGE UP (ref 3.5–5.3)
POTASSIUM SERPL-SCNC: 4.2 MMOL/L — SIGNIFICANT CHANGE UP (ref 3.5–5.3)
RBC # BLD: 5.53 M/UL — SIGNIFICANT CHANGE UP (ref 4.2–5.8)
RBC # FLD: 13.4 % — SIGNIFICANT CHANGE UP (ref 10.3–14.5)
SODIUM SERPL-SCNC: 145 MMOL/L — SIGNIFICANT CHANGE UP (ref 135–145)
WBC # BLD: 8.96 K/UL — SIGNIFICANT CHANGE UP (ref 3.8–10.5)
WBC # FLD AUTO: 8.96 K/UL — SIGNIFICANT CHANGE UP (ref 3.8–10.5)

## 2025-06-18 PROCEDURE — 99233 SBSQ HOSP IP/OBS HIGH 50: CPT

## 2025-06-18 PROCEDURE — 99232 SBSQ HOSP IP/OBS MODERATE 35: CPT

## 2025-06-18 PROCEDURE — 93010 ELECTROCARDIOGRAM REPORT: CPT | Mod: 76

## 2025-06-18 PROCEDURE — 71046 X-RAY EXAM CHEST 2 VIEWS: CPT | Mod: 26

## 2025-06-18 RX ORDER — METOPROLOL SUCCINATE 50 MG/1
25 TABLET, EXTENDED RELEASE ORAL DAILY
Refills: 0 | Status: DISCONTINUED | OUTPATIENT
Start: 2025-06-18 | End: 2025-06-19

## 2025-06-18 RX ADMIN — METOPROLOL SUCCINATE 25 MILLIGRAM(S): 50 TABLET, EXTENDED RELEASE ORAL at 10:14

## 2025-06-18 RX ADMIN — INSULIN LISPRO 1: 100 INJECTION, SOLUTION INTRAVENOUS; SUBCUTANEOUS at 13:14

## 2025-06-18 RX ADMIN — CLOPIDOGREL BISULFATE 75 MILLIGRAM(S): 75 TABLET, FILM COATED ORAL at 08:35

## 2025-06-18 RX ADMIN — DORZOLAMIDE HYDROCHLORIDE AND TIMOLOL MALEATE 1 DROP(S): 20; 5 SOLUTION/ DROPS OPHTHALMIC at 17:13

## 2025-06-18 RX ADMIN — INSULIN LISPRO 0: 100 INJECTION, SOLUTION INTRAVENOUS; SUBCUTANEOUS at 21:51

## 2025-06-18 RX ADMIN — SACUBITRIL AND VALSARTAN 1 TABLET(S): 49; 51 TABLET, FILM COATED ORAL at 17:13

## 2025-06-18 RX ADMIN — DORZOLAMIDE HYDROCHLORIDE AND TIMOLOL MALEATE 1 DROP(S): 20; 5 SOLUTION/ DROPS OPHTHALMIC at 05:45

## 2025-06-18 RX ADMIN — Medication 2000 MILLIGRAM(S): at 02:07

## 2025-06-18 RX ADMIN — SACUBITRIL AND VALSARTAN 1 TABLET(S): 49; 51 TABLET, FILM COATED ORAL at 05:45

## 2025-06-18 RX ADMIN — Medication 25 MILLIGRAM(S): at 05:45

## 2025-06-18 RX ADMIN — ATORVASTATIN CALCIUM 40 MILLIGRAM(S): 80 TABLET, FILM COATED ORAL at 21:50

## 2025-06-18 RX ADMIN — Medication 25 MILLIGRAM(S): at 17:13

## 2025-06-18 NOTE — PROGRESS NOTE ADULT - TIME BILLING
Time spent reviewing the chart documentation, reviewing labs and  evaluating the patient, discussing the plan of care with the consultants & medical team, and documenting.
Time spent reviewing the chart documentation, reviewing labs and  evaluating the patient, discussing the plan of care with the consultants & medical team, and documenting.
Time spent reviewing the chart documentation, reviewing labs and imaging studies, evaluating the patient, discussing the plan of care with the consultants & medical team, and documenting.

## 2025-06-18 NOTE — PROGRESS NOTE ADULT - SUBJECTIVE AND OBJECTIVE BOX
Pt doing well POD #1 s/p EPS via RFV access which showed prolonged HV interval, and MDT 2ch PPM implant (left bundle pacing) via left axillary stick. DDD  bpm   Stable overnight w/o event. Denies complaint.       Exam:   VSS, NAD, A&O x 3  Incision: Steri strips small amt of dried heme, but otherwise C/D/I; no bleeding, hematoma, erythema, exudate or edema; distal pulses intact  Card: S1/S2, RRR, no m/g/r  Resp: lungs CTA b/l  Abd: S/NT/ND  Ext: no edema, distal pulses intact    Device interrogation:  PENDING**   measurements appropriate & stable. Parameters confirmed. No diaphragmatic stim.     Tele:  SR     CXR: PENDING lead locations grossly stable & appropriate. Official read pending.     Labs: consistent w/ baseline.       Assessment:   69 year old male, current smoker, with HTN, hyperlipidemia, DM type 2, CAD s/p PRAKASH to ramus 4/5/23 (LM, LAD and RCA with no obstruction), pituitary macroadenoma, and known LBBB who presented to [facility] ED with near syncope and confusion. He reports intermittent episodes of dizziness and near syncope over the past year. He has been wearing a 30 day MCOT which demonstrated PVCs and 12 beats of NSVT on 6/2/25.     Now POD #1 status post EPS via RFV access which showed prolonged HV interval, and MDT 2ch PPM implant (left bundle pacing).    No events overnight.  Denies complaint    Plan:    Can resume low dose beta blocker.   There is an Aquacel Ag surgical dressing over the incision site. It is waterproof and can be worn in the shower. This dressing should be removed in 7 days.   Pt instructed as to ROM of affected arm - no lifting/pushing/pulling >10 lbs & shoulder ROM limited to 90deg & below x 4 weeks.   Pt instructed as to incision care and f/up - written instructions included in d/c documents.  Outpt f/up scheduled.    Pending final read of CXR, no barriers to d/c from EP service perspective.   Will await final CXR read, but otherwise sign off as to EP. Please call EP service with questions, concerns or further arrhythmia issues.   Dispo per primary team.    INCOMPLETE NOTE Pt doing well POD #1 s/p EPS via RFV access which showed prolonged HV interval, and MDT 2ch PPM implant (left bundle pacing) via left axillary stick. DDD  bpm   Stable overnight w/o event. Denies complaint.   Had an episodes of nausea, diaphoresis and transient dizziness this morning. No syncope.       Exam:   VSS, NAD, A&O x 3  Incision: Steri strips small amt of dried heme, but otherwise C/D/I; no bleeding, hematoma, erythema, exudate or edema; distal pulses intact  Card: S1/S2, RRR, no m/g/r  Resp: lungs CTA b/l  Abd: S/NT/ND  Ext: no edema, distal pulses intact    Device interrogation:  Normal device function. Pacing threshold, sensing, and impedance are all stable and WNL  Reprogrammed to DDDR (rate response on) due to lack of apparent HR variability in DDD @ 60bpm.      Tele: AP - ,  brief NSVT < 5 seconds duration    CXR:  lead locations grossly stable & appropriate. Official read pending.     Labs: consistent w/ baseline.       Assessment:   69 year old male, current smoker, with HTN, hyperlipidemia, DM type 2, CAD s/p PRAKASH to ramus 4/5/23 (LM, LAD and RCA with no obstruction), pituitary macroadenoma, and known LBBB who presented to [facility] ED with near syncope and confusion. He reports intermittent episodes of dizziness and near syncope over the past year. He has been wearing a 30 day MCOT which demonstrated PVCs and 12 beats of NSVT on 6/2/25.     Now POD #1 status post EPS via RFV access which showed prolonged HV interval, and MDT 2ch PPM implant (left bundle pacing).    No events overnight.      Plan:    Restart Toprol 25mg.   There is an Aquacel Ag surgical dressing over the incision site. It is waterproof and can be worn in the shower. This dressing should be removed in 7 days.   Pt instructed as to ROM of affected arm - no lifting/pushing/pulling >10 lbs & shoulder ROM limited to 90deg & below x 4 weeks.   Pt instructed as to incision care and f/up - written instructions included in d/c documents.   Chest Xray shows lead position remains grossly stable and appropriate.   Sign off as to EP. Please call EP service with questions, concerns or further arrhythmia issues.   Outpatient follow-up with Dr. Vazquez in 3-4 weeks.   Dispo per primary team.     Pt doing well POD #1 s/p EPS via RFV access which showed prolonged HV interval, and MDT 2ch PPM implant (left bundle pacing) via left axillary stick. DDD  bpm   Stable overnight w/o event. Denies complaint.   Had an episodes of nausea, diaphoresis and transient dizziness this morning. No syncope.  BP found to be 130/80s      Exam:   VSS, NAD, A&O x 3  Incision: Steri strips small amt of dried heme, but otherwise C/D/I; no bleeding, hematoma, erythema, exudate or edema; distal pulses intact  Card: S1/S2, RRR, no m/g/r  Resp: lungs CTA b/l  Abd: S/NT/ND  Ext: no edema, distal pulses intact    Device interrogation:  Normal device function. Pacing threshold, sensing, and impedance are all stable and WNL  Reprogrammed to DDDR (rate response on) due to lack of apparent HR variability in DDD @ 60bpm.      Tele: AP - ,  brief NSVT < 5 seconds duration    CXR:  lead locations grossly stable & appropriate. Official read pending.     Labs: consistent w/ baseline.       Assessment:   69 year old male, current smoker, with HTN, hyperlipidemia, DM type 2, CAD s/p PRAKASH to ramus 4/5/23 (LM, LAD and RCA with no obstruction), pituitary macroadenoma, and known LBBB who presented to [facility] ED with near syncope and confusion. He reports intermittent episodes of dizziness and near syncope over the past year. He has been wearing a 30 day MCOT which demonstrated PVCs and 12 beats of NSVT on 6/2/25.     Now POD #1 status post EPS via RFV access which showed prolonged HV interval, and MDT 2ch PPM implant (left bundle pacing).      Restart Tropol 25mg for h/o NSVT  Normal device function.   Chest Xray shows lead position remains grossly stable and appropriate.   There is an Aquacel Ag surgical dressing over the incision site. It is waterproof and can be worn in the shower. This dressing should be removed in 7 days.   Pt instructed as to ROM of affected arm - no lifting/pushing/pulling >10 lbs & shoulder ROM limited to 90deg & below x 4 weeks.   Pt instructed as to incision care and f/up - written instructions included in d/c documents.   Sign off as to EP. Please call EP service with questions, concerns or further arrhythmia issues.   Outpatient follow-up with Dr. Vazquez in 3-4 weeks.   Dispo per primary team.         Pt doing well POD #1 s/p EPS via RFV access which showed prolonged HV interval, and MDT 2ch PPM implant (left bundle pacing) via left axillary stick. DDD  bpm   Stable overnight w/o event. Denies complaint.   Had an episodes of nausea, diaphoresis and transient dizziness this morning while transporting to Xray. No syncope.  BP found to be 130/80s.  Telemetry around this time notable for two brief episodes of monomorphic NSVT < 5 seconds duration (4-8 b @ 150bpm), but no sustained VT events noted.    Had an additional episodes of MM NSVT later this morning (~8 beats, <5 sec), which was reportedly asymptomatic       Exam:   VSS, NAD, A&O x 3  Incision: Steri strips small amt of dried heme, but otherwise C/D/I; no bleeding, hematoma, erythema, exudate or edema; distal pulses intact  Card: S1/S2, RRR, no m/g/r  Resp: lungs CTA b/l  Abd: S/NT/ND  Ext: no edema, distal pulses intact    Device interrogation:  Normal device function. Pacing threshold, sensing, and impedance are all stable and WNL  Reprogrammed to DDDR (rate response on) due to lack of apparent HR variability in DDD @ 60bpm.      Tele: AP - ,  brief NSVT < 5 seconds duration    CXR:  lead locations grossly stable & appropriate. Official read pending.     Labs: consistent w/ baseline.       Assessment:   69 year old male, current smoker, with HTN, hyperlipidemia, DM type 2, CAD s/p PRAKASH to ramus 4/5/23 (LM, LAD and RCA with no obstruction), pituitary macroadenoma, and known LBBB who presented to [facility] ED with near syncope and confusion. He reports intermittent episodes of dizziness and near syncope over the past year. He has been wearing a 30 day MCOT which demonstrated PVCs and 12 beats of NSVT on 6/2/25.     Now POD #1 status post EPS via RFV access which showed prolonged HV interval, and MDT 2ch PPM implant (left bundle pacing).      Restart Tropol 25mg for h/o NSVT.  Cardiology is requesting Cardiac CTA prior to d/c.    Normal device function.   Chest Xray shows lead position remains grossly stable and appropriate.   There is an Aquacel Ag surgical dressing over the incision site. It is waterproof and can be worn in the shower. This dressing should be removed in 7 days.   Pt instructed as to ROM of affected arm - no lifting/pushing/pulling >10 lbs & shoulder ROM limited to 90deg & below x 4 weeks.   Pt instructed as to incision care and f/up - written instructions included in d/c documents.   Restart Tropol 25mg for h/o NSVT.  Cardiology is requesting Cardiac CTA prior to d/c.    Sign off as to EP. Please call EP service with questions, concerns or further arrhythmia issues.   Outpatient follow-up with Dr. Vazquez in 3-4 weeks.            Pt doing well POD #1 s/p EPS via RFV access which showed prolonged HV interval, and MDT 2ch PPM implant (left bundle pacing) via left axillary stick. DDD  bpm   Stable overnight w/o event. Denies complaint.   Had an episodes of nausea, diaphoresis and transient dizziness this morning while transporting to Xray. No syncope.  BP found to be 130/80s.  Telemetry around this time notable for two brief episodes of monomorphic NSVT < 5 seconds duration (4-8 b @ 150bpm), but no sustained VT events noted.    Had an additional episodes of MM NSVT later this morning (~8 beats, <5 sec), which was reportedly asymptomatic       Exam:   VSS, NAD, A&O x 3  Incision: Steri strips small amt of dried heme, but otherwise C/D/I; no bleeding, hematoma, erythema, exudate or edema; distal pulses intact  Card: S1/S2, RRR, no m/g/r  Resp: lungs CTA b/l  Abd: S/NT/ND  Ext: no edema, distal pulses intact    Device interrogation:  Normal device function. Pacing threshold, sensing, and impedance are all stable and WNL  Reprogrammed to DDDR (rate response on) due to lack of apparent HR variability in DDD @ 60bpm.      Tele: AP - ,  brief NSVT < 5 seconds duration    CXR:  lead locations grossly stable & appropriate. Official read pending.     Labs: consistent w/ baseline.       Assessment:   69 year old male, current smoker, with HTN, hyperlipidemia, DM type 2, CAD s/p PRAKASH to ramus 4/5/23 (LM, LAD and RCA with no obstruction), pituitary macroadenoma, and known LBBB who presented to [facility] ED with near syncope and confusion. He reports intermittent episodes of dizziness and near syncope over the past year. He has been wearing a 30 day MCOT which demonstrated PVCs and 12 beats of NSVT on 6/2/25.     Now POD #1 status post EPS via RFV access which showed prolonged HV interval, and MDT 2ch PPM implant (left bundle pacing).        Normal device function.  Chest Xray shows lead position remains grossly stable and appropriate.   There is an Aquacel Ag surgical dressing over the incision site. It is waterproof and can be worn in the shower. This dressing should be removed in 7 days.   Pt instructed as to ROM of affected arm - no lifting/pushing/pulling >10 lbs & shoulder ROM limited to 90deg & below x 4 weeks.   Pt instructed as to incision care and f/up - written instructions included in d/c documents.   Restart Tropol 25mg for h/o NSVT.  Cardiology is requesting Cardiac CTA prior to d/c.    Sign off as to EP. Please call EP service with questions, concerns or further arrhythmia issues.   Outpatient follow-up with Dr. Vazquez in 3-4 weeks.

## 2025-06-18 NOTE — PROGRESS NOTE ADULT - SUBJECTIVE AND OBJECTIVE BOX
[facility]                                                         CARDIOLOGY AT [facility]                                                                  [address], [place] [address]                                                         Telephone: [phone]. Fax:[phone]                                                                             PROGRESS NOTE    Reason for follow up:  NSVT and dizziness and abnormal holter monitor and  bradycardia    Update: patient had a EP study done that showed     Review of symptoms:   Cardiac:  No chest pain. No dyspnea. No palpitations.  Respiratory: no cough. No dyspnea  Gastrointestinal: No diarrhea. No abdominal pain. No bleeding.   Neuro: No focal neuro complaints.  All other ROS negative unless otherwise listed above    PHYSICAL EXAM:  Appearance: Comfortable. No acute distress  HEENT:  Atraumatic. Normocephalic.  Normal oral mucosa  Neurologic: A & O x 3, no gross focal deficits.  Cardiovascular: RRR S1 S2, No murmur, no rubs/gallops. No JVD  Respiratory: Lungs clear to auscultation, unlabored   Gastrointestinal:  Soft, Non-tender, + BS  Lower Extremities: 2+ Peripheral Pulses, No clubbing, cyanosis, or edema  Psychiatry: Patient is calm. No agitation.   Skin: warm and dry.    Vitals:    reviewed.   C       CURRENT OTHER MEDICATIONS:  acetaminophen     Tablet .. 650 milliGRAM(s) Oral every 6 hours PRN Temp greater or equal to 38C (100.4F), Mild Pain (1 - 3)  melatonin 3 milliGRAM(s) Oral at bedtime PRN Insomnia  ondansetron Injectable 4 milliGRAM(s) IV Push every 8 hours PRN Nausea and/or Vomiting  aluminum hydroxide/magnesium hydroxide/simethicone Suspension 30 milliLiter(s) Oral every 4 hours PRN Dyspepsia  atorvastatin 40 milliGRAM(s) Oral at bedtime  dextrose 50% Injectable 25 Gram(s) IV Push once, Stop order after: 1 Doses  dextrose 50% Injectable 12.5 Gram(s) IV Push once, Stop order after: 1 Doses  dextrose 50% Injectable 25 Gram(s) IV Push once, Stop order after: 1 Doses  dextrose Oral Gel 15 Gram(s) Oral once, Stop order after: 1 Doses PRN Blood Glucose LESS THAN 70 milliGRAM(s)/deciliter  glucagon  Injectable 1 milliGRAM(s) IntraMuscular once, Stop order after: 1 Doses  insulin lispro (ADMELOG) corrective regimen sliding scale   SubCutaneous three times a day before meals  insulin lispro (ADMELOG) corrective regimen sliding scale   SubCutaneous at bedtime  clopidogrel Tablet 75 milliGRAM(s) Oral daily  dextrose 5%. 1000 milliLiter(s) (50 mL/Hr) IV Continuous <Continuous>  dextrose 5%. 1000 milliLiter(s) (100 mL/Hr) IV Continuous <Continuous>  dorzolamide 2%/timolol 0.5% Ophthalmic Solution 1 Drop(s) Both EYES two times a day  enoxaparin Injectable 40 milliGRAM(s) SubCutaneous every 24 hours  latanoprost 0.005% Ophthalmic Solution 1 Drop(s) Both EYES at bedtime  magnesium sulfate  IVPB 1 Gram(s) IV Intermittent once, Stop order after: 1 Doses  sodium chloride 0.9%. 1000 milliLiter(s) (125 mL/Hr) IV Continuous <Continuous>      LABS:	 	       reviewed   Lipid Profile: Date: 06-14 @ 02:46  Total cholesterol 108; Direct LDL: --; HDL: 29; Triglycerides:125    HgA1c:   TSH: Thyroid Stimulating Hormone, Serum: 1.90 uIU/mL

## 2025-06-18 NOTE — PROGRESS NOTE ADULT - ASSESSMENT
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  Day #2 .Now status post   PPM implant (left bundle pacing).    2) NSVT.  no sym,toms as of now.  prior stress test was normal.  will defer any testing  in hosiptal. will contineu to monitor.   beta-blocker for NSVT

## 2025-06-18 NOTE — PROGRESS NOTE ADULT - NS ATTEND AMEND GEN_ALL_CORE FT
I agree with the above
69M h/o HTN, HLD, LBBB, CAD with cath in 2023 stent to ramus, had Echo done in 12/2024 with progressive drop in LV EF 44% and moderate AS, recent dizziness had MCOT placed found with 12 beats NSVT started on metoprolol pending elective outpatient left heart cath and MRI found with 1.5 cm pituitary macroadenoma, reports sudden altered mental state yesterday with speech impairment and also few weeks ago with blurry vision while driving, has not seen outpatient Neurology or Endocrine yet, CT negative for infarct/bleed, admitted for further workup      endocrinology evaluation and neurology evaluation recommended    no acute cardiac testing planned at present  continue to monitor on telemetry    we will follow peripherally if no change in 2d TTE at present
Avoid prophylactic Lovenox/heparin or any other AC

## 2025-06-18 NOTE — PROGRESS NOTE ADULT - SUBJECTIVE AND OBJECTIVE BOX
Patient is a 69y old  Male who presents with a chief complaint of AMS/ near sycnope (18 Jun 2025 08:40)    pt is not feeling well.episode of dizziness am while went for chest x-ray with sweating  c/s fatigue  Tele NSVT X3 AM (4-8eats)  REVIEW OF SYSTEMS: All systems are reviewed and found to be negative except above    MEDICATIONS  (STANDING):  atorvastatin 40 milliGRAM(s) Oral at bedtime  clopidogrel Tablet 75 milliGRAM(s) Oral daily  dextrose 5%. 1000 milliLiter(s) (50 mL/Hr) IV Continuous <Continuous>  dextrose 5%. 1000 milliLiter(s) (100 mL/Hr) IV Continuous <Continuous>  dextrose 50% Injectable 25 Gram(s) IV Push once  dextrose 50% Injectable 12.5 Gram(s) IV Push once  dextrose 50% Injectable 25 Gram(s) IV Push once  dorzolamide 2%/timolol 0.5% Ophthalmic Solution 1 Drop(s) Both EYES two times a day  glucagon  Injectable 1 milliGRAM(s) IntraMuscular once  hydrALAZINE 25 milliGRAM(s) Oral two times a day  insulin lispro (ADMELOG) corrective regimen sliding scale   SubCutaneous three times a day before meals  insulin lispro (ADMELOG) corrective regimen sliding scale   SubCutaneous at bedtime  latanoprost 0.005% Ophthalmic Solution 1 Drop(s) Both EYES at bedtime  metoprolol succinate ER 25 milliGRAM(s) Oral daily  procainamide   IVPB 1000 milliGRAM(s) IV Bolus once  sacubitril 24 mG/valsartan 26 mG 1 Tablet(s) Oral two times a day    MEDICATIONS  (PRN):  acetaminophen     Tablet .. 650 milliGRAM(s) Oral every 6 hours PRN Temp greater or equal to 38C (100.4F), Mild Pain (1 - 3)  aluminum hydroxide/magnesium hydroxide/simethicone Suspension 30 milliLiter(s) Oral every 4 hours PRN Dyspepsia  atropine Injectable 0.5 milliGRAM(s) IV Push once PRN HR <30 for >5min or if becomes symptomatic  dextrose Oral Gel 15 Gram(s) Oral once PRN Blood Glucose LESS THAN 70 milliGRAM(s)/deciliter  melatonin 3 milliGRAM(s) Oral at bedtime PRN Insomnia  ondansetron Injectable 4 milliGRAM(s) IV Push every 8 hours PRN Nausea and/or Vomiting  oxycodone    5 mG/acetaminophen 325 mG 1 Tablet(s) Oral every 6 hours PRN Severe Pain (7 - 10)      CAPILLARY BLOOD GLUCOSE      POCT Blood Glucose.: 103 mg/dL (18 Jun 2025 08:34)  POCT Blood Glucose.: 120 mg/dL (17 Jun 2025 21:05)  POCT Blood Glucose.: 77 mg/dL (17 Jun 2025 17:07)    I&O's Summary    17 Jun 2025 07:01  -  18 Jun 2025 07:00  --------------------------------------------------------  IN: 240 mL / OUT: 2250 mL / NET: -2010 mL        PHYSICAL EXAM:  Vital Signs Last 24 Hrs  T(C): 36.6 (18 Jun 2025 09:16), Max: 36.7 (17 Jun 2025 15:03)  T(F): 97.9 (18 Jun 2025 09:16), Max: 98 (17 Jun 2025 15:03)  HR: 60 (18 Jun 2025 10:08) (50 - 60)  BP: 129/74 (18 Jun 2025 10:08) (126/78 - 179/91)  BP(mean): 97 (18 Jun 2025 09:16) (97 - 121)  RR: 18 (18 Jun 2025 09:16) (15 - 18)  SpO2: 93% (18 Jun 2025 09:16) (93% - 97%)    Parameters below as of 18 Jun 2025 09:16  Patient On (Oxygen Delivery Method): room air        CONSTITUTIONAL: NAD,  EYES: PERRLA; conjunctiva and sclera clear  ENMT: Moist oral mucosa,   RESPIRATORY: Normal respiratory effort; lungs are clear to auscultation bilaterally  CARDIOVASCULAR:  normal S1 and S2, no murmur   EXTS: No lower extremity edema; Peripheral pulses are 2+ bilaterally  ABDOMEN: Nontender to palpation, normoactive bowel sounds, no rebound/guarding;   MUSCLOSKELETAL:   no clubbing or cyanosis of digits; no joint swelling or tenderness to palpation  PSYCH: affect appropriate  NEUROLOGY: A+O to person, place, and time; CN 2-12 are intact and symmetric; no gross sensory deficits;       LABS:                        17.4   8.96  )-----------( 178      ( 18 Jun 2025 05:15 )             49.9     06-18    145  |  107  |  11.7  ----------------------------<  84  4.2   |  25.0  |  0.89    Ca    8.8      18 Jun 2025 05:15  Mg     1.9     06-18            Urinalysis Basic - ( 18 Jun 2025 05:15 )    Color: x / Appearance: x / SG: x / pH: x  Gluc: 84 mg/dL / Ketone: x  / Bili: x / Urobili: x   Blood: x / Protein: x / Nitrite: x   Leuk Esterase: x / RBC: x / WBC x   Sq Epi: x / Non Sq Epi: x / Bacteria: x          RADIOLOGY & ADDITIONAL TESTS:  Results Reviewed:

## 2025-06-18 NOTE — PROGRESS NOTE ADULT - ASSESSMENT
69y/oM PMH CAD, HTN, HLD, DM, intermittent LBBB, pituitary macroadenoma, current smoker presenting to ER with confusion. Pt also with recent episodes near-syncope with blurry and "closing in" vision, which resolved after a couple minutes, has MCOT in place. Pt admitted for further work up.MRI brain no acute change.TTE stable ef.     AMS   Near syncope   Sinus  bradycardia  NSVT   - tele (nsvt am w/symptoms am)  - He has been wearing a 30 day MCOT which demonstrated PVCs and 12 beats of NSVT on 6/2/25. Now status post EPS via RFV access which showed prolonged HV interval, and MDT s/p  PPM implant (left bundle pacing).  -CTH: No ICH, mass effect or infarct, +mild chronic ischemic changes in white matter ; +unchanged appearance of pituitary macroadenoma with suprasellar extension abutting optic chiasm ; +intracranial carotid arteries   -CTA Head/neck: no evidence significant stenosis or occlusion, no LVO, significant stenosis or vascular abnormality   -CT perfusion not post-processed, could not be evaluated   -MRI BRAIN Unchanged sellar/suprasellar lesion with internal proteinaceous or hemorrhagic contents. Differential diagnosis includes   but is not limited to a macroadenoma or complex Rathke cleft cyst versus other lesion.  -EEG no acute seizure  -stable orthostatics   -trops flat  -seen by cardio   -f/u neuro rec  -TTE  ef 55-60%  - f/u cardiology rec.   -no further work up per neurology  -DW  ep, will f/u rec  -repeat orth bp am stable     CAD s/p PCI   HTN, HLD  -monitor on tele   - home meds: metoprolol succinate 12.5mg qd, atorvastatin 40mg qd, valsartan-hctz 40/12.5mg (0.5 tab qd), plavix 75mg qd  -pt reports only taking plavix currently, no aspirin     DM   -hold home Jardiance  -HbA1c 5.8  -ISS     Glaucoma   -cont eye gtts     Tobacco dependence   -cessation advised   -declines NRT     vte ppx: lovenox sq     Disposition: pending. dc plan  tomorrow   Plan of care dw pt   endy rn

## 2025-06-19 ENCOUNTER — TRANSCRIPTION ENCOUNTER (OUTPATIENT)
Age: 70
End: 2025-06-19

## 2025-06-19 VITALS
TEMPERATURE: 98 F | OXYGEN SATURATION: 99 % | HEART RATE: 60 BPM | DIASTOLIC BLOOD PRESSURE: 66 MMHG | SYSTOLIC BLOOD PRESSURE: 140 MMHG | RESPIRATION RATE: 18 BRPM

## 2025-06-19 LAB
ANION GAP SERPL CALC-SCNC: 12 MMOL/L — SIGNIFICANT CHANGE UP (ref 5–17)
BUN SERPL-MCNC: 13.5 MG/DL — SIGNIFICANT CHANGE UP (ref 8–20)
CALCIUM SERPL-MCNC: 8.8 MG/DL — SIGNIFICANT CHANGE UP (ref 8.4–10.5)
CHLORIDE SERPL-SCNC: 105 MMOL/L — SIGNIFICANT CHANGE UP (ref 96–108)
CO2 SERPL-SCNC: 25 MMOL/L — SIGNIFICANT CHANGE UP (ref 22–29)
CREAT SERPL-MCNC: 0.86 MG/DL — SIGNIFICANT CHANGE UP (ref 0.5–1.3)
EGFR: 94 ML/MIN/1.73M2 — SIGNIFICANT CHANGE UP
EGFR: 94 ML/MIN/1.73M2 — SIGNIFICANT CHANGE UP
GLUCOSE BLDC GLUCOMTR-MCNC: 104 MG/DL — HIGH (ref 70–99)
GLUCOSE BLDC GLUCOMTR-MCNC: 99 MG/DL — SIGNIFICANT CHANGE UP (ref 70–99)
GLUCOSE SERPL-MCNC: 98 MG/DL — SIGNIFICANT CHANGE UP (ref 70–99)
POTASSIUM SERPL-MCNC: 4 MMOL/L — SIGNIFICANT CHANGE UP (ref 3.5–5.3)
POTASSIUM SERPL-SCNC: 4 MMOL/L — SIGNIFICANT CHANGE UP (ref 3.5–5.3)
SODIUM SERPL-SCNC: 142 MMOL/L — SIGNIFICANT CHANGE UP (ref 135–145)

## 2025-06-19 PROCEDURE — 81003 URINALYSIS AUTO W/O SCOPE: CPT

## 2025-06-19 PROCEDURE — 95711 VEEG 2-12 HR UNMONITORED: CPT

## 2025-06-19 PROCEDURE — 83735 ASSAY OF MAGNESIUM: CPT

## 2025-06-19 PROCEDURE — 82962 GLUCOSE BLOOD TEST: CPT

## 2025-06-19 PROCEDURE — 0225U NFCT DS DNA&RNA 21 SARSCOV2: CPT

## 2025-06-19 PROCEDURE — 84443 ASSAY THYROID STIM HORMONE: CPT

## 2025-06-19 PROCEDURE — 93306 TTE W/DOPPLER COMPLETE: CPT

## 2025-06-19 PROCEDURE — 71046 X-RAY EXAM CHEST 2 VIEWS: CPT

## 2025-06-19 PROCEDURE — C1785: CPT

## 2025-06-19 PROCEDURE — 75574 CT ANGIO HRT W/3D IMAGE: CPT | Mod: 26

## 2025-06-19 PROCEDURE — 84146 ASSAY OF PROLACTIN: CPT

## 2025-06-19 PROCEDURE — 84484 ASSAY OF TROPONIN QUANT: CPT

## 2025-06-19 PROCEDURE — 99239 HOSP IP/OBS DSCHRG MGMT >30: CPT

## 2025-06-19 PROCEDURE — 95700 EEG CONT REC W/VID EEG TECH: CPT

## 2025-06-19 PROCEDURE — 85610 PROTHROMBIN TIME: CPT

## 2025-06-19 PROCEDURE — 80307 DRUG TEST PRSMV CHEM ANLYZR: CPT

## 2025-06-19 PROCEDURE — 86900 BLOOD TYPING SEROLOGIC ABO: CPT

## 2025-06-19 PROCEDURE — 85730 THROMBOPLASTIN TIME PARTIAL: CPT

## 2025-06-19 PROCEDURE — 87798 DETECT AGENT NOS DNA AMP: CPT

## 2025-06-19 PROCEDURE — C1898: CPT

## 2025-06-19 PROCEDURE — 93005 ELECTROCARDIOGRAM TRACING: CPT

## 2025-06-19 PROCEDURE — 85027 COMPLETE CBC AUTOMATED: CPT

## 2025-06-19 PROCEDURE — 86850 RBC ANTIBODY SCREEN: CPT

## 2025-06-19 PROCEDURE — 85025 COMPLETE CBC W/AUTO DIFF WBC: CPT

## 2025-06-19 PROCEDURE — 75574 CT ANGIO HRT W/3D IMAGE: CPT

## 2025-06-19 PROCEDURE — 70450 CT HEAD/BRAIN W/O DYE: CPT

## 2025-06-19 PROCEDURE — 80061 LIPID PANEL: CPT

## 2025-06-19 PROCEDURE — 0042T: CPT

## 2025-06-19 PROCEDURE — 70553 MRI BRAIN STEM W/O & W/DYE: CPT

## 2025-06-19 PROCEDURE — 70496 CT ANGIOGRAPHY HEAD: CPT

## 2025-06-19 PROCEDURE — 33208 INSRT HEART PM ATRIAL & VENT: CPT

## 2025-06-19 PROCEDURE — C1730: CPT

## 2025-06-19 PROCEDURE — 80048 BASIC METABOLIC PNL TOTAL CA: CPT

## 2025-06-19 PROCEDURE — 86618 LYME DISEASE ANTIBODY: CPT

## 2025-06-19 PROCEDURE — 83036 HEMOGLOBIN GLYCOSYLATED A1C: CPT

## 2025-06-19 PROCEDURE — 80053 COMPREHEN METABOLIC PANEL: CPT

## 2025-06-19 PROCEDURE — 36415 COLL VENOUS BLD VENIPUNCTURE: CPT

## 2025-06-19 PROCEDURE — 86901 BLOOD TYPING SEROLOGIC RH(D): CPT

## 2025-06-19 PROCEDURE — 80076 HEPATIC FUNCTION PANEL: CPT

## 2025-06-19 PROCEDURE — 84439 ASSAY OF FREE THYROXINE: CPT

## 2025-06-19 PROCEDURE — C1769: CPT

## 2025-06-19 PROCEDURE — 84480 ASSAY TRIIODOTHYRONINE (T3): CPT

## 2025-06-19 PROCEDURE — 95714 VEEG EA 12-26 HR UNMNTR: CPT

## 2025-06-19 PROCEDURE — 70498 CT ANGIOGRAPHY NECK: CPT

## 2025-06-19 PROCEDURE — C1894: CPT

## 2025-06-19 PROCEDURE — 84100 ASSAY OF PHOSPHORUS: CPT

## 2025-06-19 PROCEDURE — C1892: CPT

## 2025-06-19 PROCEDURE — 71045 X-RAY EXAM CHEST 1 VIEW: CPT

## 2025-06-19 PROCEDURE — 99285 EMERGENCY DEPT VISIT HI MDM: CPT

## 2025-06-19 RX ORDER — METOPROLOL SUCCINATE 50 MG/1
0.5 TABLET, EXTENDED RELEASE ORAL
Refills: 0 | DISCHARGE

## 2025-06-19 RX ORDER — METOPROLOL SUCCINATE 50 MG/1
1 TABLET, EXTENDED RELEASE ORAL
Qty: 0 | Refills: 0 | DISCHARGE
Start: 2025-06-19

## 2025-06-19 RX ADMIN — Medication 25 MILLIGRAM(S): at 06:06

## 2025-06-19 RX ADMIN — SACUBITRIL AND VALSARTAN 1 TABLET(S): 49; 51 TABLET, FILM COATED ORAL at 06:05

## 2025-06-19 RX ADMIN — DORZOLAMIDE HYDROCHLORIDE AND TIMOLOL MALEATE 1 DROP(S): 20; 5 SOLUTION/ DROPS OPHTHALMIC at 06:06

## 2025-06-19 RX ADMIN — METOPROLOL SUCCINATE 25 MILLIGRAM(S): 50 TABLET, EXTENDED RELEASE ORAL at 06:05

## 2025-06-19 NOTE — PROGRESS NOTE ADULT - PROVIDER SPECIALTY LIST ADULT
Electrophysiology
Hospitalist
Neurology
Cardiology
Electrophysiology
Hospitalist
Neurology
Hospitalist
Cardiology
Hospitalist

## 2025-06-19 NOTE — DISCHARGE NOTE NURSING/CASE MANAGEMENT/SOCIAL WORK - NSDCPEFALRISK_GEN_ALL_CORE
For information on Fall & Injury Prevention, visit: [email or web address] OR  [email or web address] OR  https://www.cdc.gov/steadi/patient.html

## 2025-06-19 NOTE — PROGRESS NOTE ADULT - SUBJECTIVE AND OBJECTIVE BOX
[facility]                                                         CARDIOLOGY AT [facility]                                                                  [address], [place] [address]                                                         Telephone: [phone]. Fax:[phone]                                                                             PROGRESS NOTE    Reason for follow up:  NSVT and dizziness and abnormal holter monitor and  bradycardia    Update:  patient had symptoms of flushing and chest pain .  scheduled fro cardiac cta.       Review of symptoms:   Cardiac:  + chest pain. No dyspnea. No palpitations.  Respiratory: no cough. No dyspnea  Gastrointestinal: No diarrhea. No abdominal pain. No bleeding.   Neuro: No focal neuro complaints.  All other ROS negative unless otherwise listed above    PHYSICAL EXAM:  Appearance: Comfortable. No acute distress  HEENT:  Atraumatic. Normocephalic.  Normal oral mucosa  Neurologic: A & O x 3, no gross focal deficits.  Cardiovascular: RRR S1 S2, No murmur, no rubs/gallops. No JVD  Respiratory: Lungs clear to auscultation, unlabored   Gastrointestinal:  Soft, Non-tender, + BS  Lower Extremities: 2+ Peripheral Pulses, No clubbing, cyanosis, or edema  Psychiatry: Patient is calm. No agitation.   Skin: warm and dry.    Vitals:    reviewed.   C       CURRENT OTHER MEDICATIONS:  acetaminophen     Tablet .. 650 milliGRAM(s) Oral every 6 hours PRN Temp greater or equal to 38C (100.4F), Mild Pain (1 - 3)  melatonin 3 milliGRAM(s) Oral at bedtime PRN Insomnia  ondansetron Injectable 4 milliGRAM(s) IV Push every 8 hours PRN Nausea and/or Vomiting  aluminum hydroxide/magnesium hydroxide/simethicone Suspension 30 milliLiter(s) Oral every 4 hours PRN Dyspepsia  atorvastatin 40 milliGRAM(s) Oral at bedtime  dextrose 50% Injectable 25 Gram(s) IV Push once, Stop order after: 1 Doses  dextrose 50% Injectable 12.5 Gram(s) IV Push once, Stop order after: 1 Doses  dextrose 50% Injectable 25 Gram(s) IV Push once, Stop order after: 1 Doses  dextrose Oral Gel 15 Gram(s) Oral once, Stop order after: 1 Doses PRN Blood Glucose LESS THAN 70 milliGRAM(s)/deciliter  glucagon  Injectable 1 milliGRAM(s) IntraMuscular once, Stop order after: 1 Doses  insulin lispro (ADMELOG) corrective regimen sliding scale   SubCutaneous three times a day before meals  insulin lispro (ADMELOG) corrective regimen sliding scale   SubCutaneous at bedtime  clopidogrel Tablet 75 milliGRAM(s) Oral daily  dextrose 5%. 1000 milliLiter(s) (50 mL/Hr) IV Continuous <Continuous>  dextrose 5%. 1000 milliLiter(s) (100 mL/Hr) IV Continuous <Continuous>  dorzolamide 2%/timolol 0.5% Ophthalmic Solution 1 Drop(s) Both EYES two times a day  enoxaparin Injectable 40 milliGRAM(s) SubCutaneous every 24 hours  latanoprost 0.005% Ophthalmic Solution 1 Drop(s) Both EYES at bedtime  magnesium sulfate  IVPB 1 Gram(s) IV Intermittent once, Stop order after: 1 Doses  sodium chloride 0.9%. 1000 milliLiter(s) (125 mL/Hr) IV Continuous <Continuous>      LABS:	 	       reviewed   Lipid Profile: Date: 06-14 @ 02:46  Total cholesterol 108; Direct LDL: --; HDL: 29; Triglycerides:125    HgA1c:   TSH: Thyroid Stimulating Hormone, Serum: 1.90 uIU/mL

## 2025-06-19 NOTE — DISCHARGE NOTE NURSING/CASE MANAGEMENT/SOCIAL WORK - NSDCFUADDAPPT_GEN_ALL_CORE_FT
APPTS ARE READY TO BE MADE: [X] YES    Best Family or Patient Contact (if needed):    Additional Information about above appointments (if needed):    1: PCP ONE WEEK  2:   3:     Other comments or requests:

## 2025-06-19 NOTE — PROGRESS NOTE ADULT - ASSESSMENT
69M h/o HTN, HLD, LBBB, CAD with cath in 2023 stent to ramus, had Echo done in 12/2024 with progressive drop in LV EF 44% and moderate AS, recent dizziness had MCOT placed found with 12 beats NSVT started on metoprolol pending elective outpatient left heart cath and MRI found with 1.5 cm pituitary macroadenoma, reports sudden altered mental state yesterday with speech impairment and also few weeks ago with blurry vision while driving, has not seen outpatient Neurology or Endocrine yet, CT negative for infarct/bleed, admitted for further workup      1) chest pain adn diaphoresis:  with symptoms of NSVT. cardaic cta was performed in [facility].  and showed no obstructive coronary artery disease  .  will cancel cardiac cath.  ct medical theprayt for coronary artery disease   2) bradycardia and Dizziness:  Ep study showed conduction diseas.  Day #2 .Now status post   PPM implant (left bundle pacing).    3) NSVT.     beta-blocker for NSVT non obstructive coronary artery disease with prior negative stress test and also now with non osbtructive cta . patent stents ct beta-blocker and medical therapy.     No further in-patient cardiac work-up/management is needed.  Follow-up in cardiology office in 2 weeks.

## 2025-06-19 NOTE — PROGRESS NOTE ADULT - ASSESSMENT
69y/oM PMH CAD, HTN, HLD, DM, intermittent LBBB, pituitary macroadenoma, current smoker presenting to ER with confusion. Pt also with recent episodes near-syncope with blurry and "closing in" vision, which resolved after a couple minutes, has MCOT in place. Pt admitted for further work up.MRI brain no acute change.TTE stable ef.     AMS   Near syncope   Sinus  bradycardia  NSVT   - tele (nsvt am w/symptoms am)  - He has been wearing a 30 day MCOT which demonstrated PVCs and 12 beats of NSVT on 6/2/25. Now status post EPS via RFV access which showed prolonged HV interval, and MDT s/p  PPM implant (left bundle pacing).  -CTH: No ICH, mass effect or infarct, +mild chronic ischemic changes in white matter ; +unchanged appearance of pituitary macroadenoma with suprasellar extension abutting optic chiasm ; +intracranial carotid arteries   -CTA Head/neck: no evidence significant stenosis or occlusion, no LVO, significant stenosis or vascular abnormality   -CT perfusion not post-processed, could not be evaluated   -MRI BRAIN Unchanged sellar/suprasellar lesion with internal proteinaceous or hemorrhagic contents. Differential diagnosis includes   but is not limited to a macroadenoma or complex Rathke cleft cyst versus other lesion.  -EEG no acute seizure  -stable orthostatics   -trops flat  -seen by cardio   -f/u neuro rec  -TTE  ef 55-60%  - per cardiology plan for [facility] CT  -no further work up per neurology  -DW  ep, will f/u rec  -repeat orth bp am stable     CAD s/p PCI   HTN, HLD  -monitor on tele   - home meds: metoprolol succinate 12.5mg qd, atorvastatin 40mg qd, valsartan-hctz 40/12.5mg (0.5 tab qd), plavix 75mg qd  -pt reports only taking plavix currently, no aspirin     DM   -hold home Jardiance  -HbA1c 5.8  -ISS     Glaucoma   -cont eye gtts     Tobacco dependence   -cessation advised   -declines NRT     vte ppx: lovenox sq     Disposition: pending cardiac CT  Plan of care dw pt   dw cardiology team-

## 2025-06-19 NOTE — DISCHARGE NOTE NURSING/CASE MANAGEMENT/SOCIAL WORK - PATIENT PORTAL LINK FT
You can access the FollowMyHealth Patient Portal offered by [facility] by registering at the following website: [email or web address]. By joining [employer]’s FollowMyHealth portal, you will also be able to view your health information using other applications (apps) compatible with our system.

## 2025-06-19 NOTE — PROGRESS NOTE ADULT - SUBJECTIVE AND OBJECTIVE BOX
Patient is a 69y old  Male who presents with a chief complaint of AMS/ near sycnope (18 Jun 2025 08:40)        REVIEW OF SYSTEMS: All systems are reviewed and found to be negative except above    MEDICATIONS  (STANDING):  atorvastatin 40 milliGRAM(s) Oral at bedtime  clopidogrel Tablet 75 milliGRAM(s) Oral daily  dextrose 5%. 1000 milliLiter(s) (50 mL/Hr) IV Continuous <Continuous>  dextrose 5%. 1000 milliLiter(s) (100 mL/Hr) IV Continuous <Continuous>  dextrose 50% Injectable 25 Gram(s) IV Push once  dextrose 50% Injectable 12.5 Gram(s) IV Push once  dextrose 50% Injectable 25 Gram(s) IV Push once  dorzolamide 2%/timolol 0.5% Ophthalmic Solution 1 Drop(s) Both EYES two times a day  glucagon  Injectable 1 milliGRAM(s) IntraMuscular once  hydrALAZINE 25 milliGRAM(s) Oral two times a day  insulin lispro (ADMELOG) corrective regimen sliding scale   SubCutaneous three times a day before meals  insulin lispro (ADMELOG) corrective regimen sliding scale   SubCutaneous at bedtime  latanoprost 0.005% Ophthalmic Solution 1 Drop(s) Both EYES at bedtime  metoprolol succinate ER 25 milliGRAM(s) Oral daily  procainamide   IVPB 1000 milliGRAM(s) IV Bolus once  sacubitril 24 mG/valsartan 26 mG 1 Tablet(s) Oral two times a day    MEDICATIONS  (PRN):  acetaminophen     Tablet .. 650 milliGRAM(s) Oral every 6 hours PRN Temp greater or equal to 38C (100.4F), Mild Pain (1 - 3)  aluminum hydroxide/magnesium hydroxide/simethicone Suspension 30 milliLiter(s) Oral every 4 hours PRN Dyspepsia  atropine Injectable 0.5 milliGRAM(s) IV Push once PRN HR <30 for >5min or if becomes symptomatic  dextrose Oral Gel 15 Gram(s) Oral once PRN Blood Glucose LESS THAN 70 milliGRAM(s)/deciliter  melatonin 3 milliGRAM(s) Oral at bedtime PRN Insomnia  ondansetron Injectable 4 milliGRAM(s) IV Push every 8 hours PRN Nausea and/or Vomiting  oxycodone    5 mG/acetaminophen 325 mG 1 Tablet(s) Oral every 6 hours PRN Severe Pain (7 - 10)      CAPILLARY BLOOD GLUCOSE      POCT Blood Glucose.: 99 mg/dL (19 Jun 2025 07:46)  POCT Blood Glucose.: 104 mg/dL (18 Jun 2025 21:50)  POCT Blood Glucose.: 78 mg/dL (18 Jun 2025 17:12)  POCT Blood Glucose.: 164 mg/dL (18 Jun 2025 13:13)    I&O's Summary      PHYSICAL EXAM:  Vital Signs Last 24 Hrs  T(C): 36.4 (19 Jun 2025 08:45), Max: 36.7 (18 Jun 2025 21:15)  T(F): 97.6 (19 Jun 2025 08:45), Max: 98.1 (18 Jun 2025 21:15)  HR: 60 (19 Jun 2025 08:45) (59 - 63)  BP: 124/75 (19 Jun 2025 08:45) (124/75 - 174/97)  BP(mean): 92 (19 Jun 2025 08:45) (92 - 100)  RR: 18 (19 Jun 2025 08:45) (18 - 18)  SpO2: 97% (19 Jun 2025 08:45) (93% - 97%)    Parameters below as of 19 Jun 2025 08:45  Patient On (Oxygen Delivery Method): room air        CONSTITUTIONAL: NAD,  EYES: PERRLA; conjunctiva and sclera clear  ENMT: Moist oral mucosa,   RESPIRATORY: Normal respiratory effort; lungs are clear to auscultation bilaterally  CARDIOVASCULAR:  normal S1 and S2, no murmur   EXTS: No lower extremity edema; Peripheral pulses are 2+ bilaterally  ABDOMEN: Nontender to palpation, normoactive bowel sounds, no rebound/guarding;   MUSCLOSKELETAL:   ; no joint swelling or tenderness to palpation  PSYCH: affect appropriate  NEUROLOGY: A+O to person, place, and time; CN 2-12 are intact and symmetric; no gross sensory deficits;       LABS:                        17.4   8.96  )-----------( 178      ( 18 Jun 2025 05:15 )             49.9     06-19    142  |  105  |  13.5  ----------------------------<  98  4.0   |  25.0  |  0.86    Ca    8.8      19 Jun 2025 05:00  Mg     1.9     06-18            Urinalysis Basic - ( 19 Jun 2025 05:00 )    Color: x / Appearance: x / SG: x / pH: x  Gluc: 98 mg/dL / Ketone: x  / Bili: x / Urobili: x   Blood: x / Protein: x / Nitrite: x   Leuk Esterase: x / RBC: x / WBC x   Sq Epi: x / Non Sq Epi: x / Bacteria: x          RADIOLOGY & ADDITIONAL TESTS:  Results Reviewed:

## 2025-06-24 PROCEDURE — 93272 ECG/REVIEW INTERPRET ONLY: CPT

## 2025-06-26 ENCOUNTER — APPOINTMENT (OUTPATIENT)
Dept: ELECTROPHYSIOLOGY | Facility: CLINIC | Age: 70
End: 2025-06-26
Payer: MEDICARE

## 2025-06-26 ENCOUNTER — APPOINTMENT (OUTPATIENT)
Dept: CARDIOLOGY | Facility: CLINIC | Age: 70
End: 2025-06-26

## 2025-06-26 VITALS
SYSTOLIC BLOOD PRESSURE: 118 MMHG | HEART RATE: 80 BPM | OXYGEN SATURATION: 97 % | HEIGHT: 76 IN | DIASTOLIC BLOOD PRESSURE: 78 MMHG | BODY MASS INDEX: 26.55 KG/M2 | WEIGHT: 218 LBS

## 2025-06-26 PROCEDURE — 99215 OFFICE O/P EST HI 40 MIN: CPT | Mod: 24,25

## 2025-06-26 PROCEDURE — 93282 PRGRMG EVAL IMPLANTABLE DFB: CPT

## 2025-06-26 PROCEDURE — 93000 ELECTROCARDIOGRAM COMPLETE: CPT | Mod: 59

## 2025-06-26 RX ORDER — DORZOLAMIDE HYDROCHLORIDE 20 MG/ML
2 SOLUTION OPHTHALMIC
Refills: 0 | Status: ACTIVE | COMMUNITY

## 2025-06-26 RX ORDER — METOPROLOL SUCCINATE 25 MG/1
25 TABLET, EXTENDED RELEASE ORAL DAILY
Refills: 0 | Status: ACTIVE | COMMUNITY

## 2025-06-26 RX ORDER — HYDRALAZINE HYDROCHLORIDE 25 MG/1
25 TABLET ORAL TWICE DAILY
Refills: 0 | Status: DISCONTINUED | COMMUNITY
End: 2025-06-26

## 2025-06-26 RX ORDER — SACUBITRIL AND VALSARTAN 24; 26 MG/1; MG/1
24-26 TABLET, FILM COATED ORAL
Refills: 0 | Status: ACTIVE | COMMUNITY

## 2025-06-30 NOTE — CDI QUERY NOTE - NSCDIOTHERTXTBX_GEN_ALL_CORE_HH
Clinical documentation and/or evidence of the patient’s presentation, evaluation, and medical management, as evidenced below, may support a diagnosis that is not documented in the medical record.  In order to ensure accurate coding and accuracy of the clinical record, the documentation in this patient’s medical record requires additional clarification.      If you think the supporting documentation and/or clinical evidence supports a more specific diagnosis, please include more specific documentation of a diagnosis associated with these findings in your progress note and/or discharge summary.    	  Please clarify the etiology of near syncope with altered mental status  - near syncope with altered mental status due to TIA  - near syncope with altered mental status due to bradycardia  -Other, please specify        Supporting documentation and/or clinical evidence:       Progress Note Adult-Neurology Attending [Charted Location: [address] [address]] [Authored: 15-Kailash-2025 11:19]    Assessment and Plan:   · Assessment	  69y Male with recurrent syncope and now with episode of altered mental status and vision changes.     Episode of altered mental status  Possible TIA.       Progress Note Adult-Cardiology Attending [Charted Location: [address] [address]25 01] [Authored: 18-Jun-2025 22:14]  reports sudden altered mental state yesterday with speech impairment and also few weeks ago with blurry vision while driving, has not seen outpatient Neurology or Endocrine yet, CT negative for infarct/bleed, admitted for further workup      1) bradycardia and Dizziness:  Ep study showed conduction diseas.  Day #2 .Now status post   PPM implant (left bundle pacing).    2) NSVT.  no sym,toms as of now.  prior stress test was normal.  will defer any testing  in hosiptal. will contineu to monitor.   beta-blocker for NSVT         Discharge Note Provider [Charted Location: [address] [address]25 01] [Authored: 17-Jun-2025 09:20]  Hospital Course	  69y/oM PMH CAD, HTN, HLD, DM, intermittent LBBB, pituitary macroadenoma, current smoker presenting to ER with confusion. Pt also with recent episodes near-syncope with blurry and "closing in" vision, which resolved after a couple minutes, has MCOT in place. Pt admitted  near syncope,ams,sinus bradycardia,NSVT for further work up.MRI brain no acute change.TTE stable ef. Seen by neurology,cardiology and EP. mri brain,cth No acute chnaged. cleared by neurology to discharge.F/U out pt. s/p PPM implant by EP.Resume BB. Pt had episode of dizziness and NSVT next day post op during cxr.    PRINCIPAL DISCHARGE DIAGNOSIS  Diagnosis: Bradycardia

## 2025-07-11 ENCOUNTER — EMERGENCY (EMERGENCY)
Facility: HOSPITAL | Age: 70
LOS: 1 days | End: 2025-07-11
Attending: EMERGENCY MEDICINE
Payer: MEDICARE

## 2025-07-11 VITALS
TEMPERATURE: 97 F | HEART RATE: 77 BPM | RESPIRATION RATE: 20 BRPM | WEIGHT: 161.82 LBS | OXYGEN SATURATION: 99 % | DIASTOLIC BLOOD PRESSURE: 68 MMHG | HEIGHT: 76 IN | SYSTOLIC BLOOD PRESSURE: 108 MMHG

## 2025-07-11 DIAGNOSIS — Z98.890 OTHER SPECIFIED POSTPROCEDURAL STATES: Chronic | ICD-10-CM

## 2025-07-11 LAB
ALBUMIN SERPL ELPH-MCNC: 3.9 G/DL — SIGNIFICANT CHANGE UP (ref 3.3–5.2)
ALP SERPL-CCNC: 92 U/L — SIGNIFICANT CHANGE UP (ref 40–120)
ALT FLD-CCNC: 10 U/L — SIGNIFICANT CHANGE UP
ANION GAP SERPL CALC-SCNC: 10 MMOL/L — SIGNIFICANT CHANGE UP (ref 5–17)
AST SERPL-CCNC: 16 U/L — SIGNIFICANT CHANGE UP
BASOPHILS # BLD AUTO: 0.06 K/UL — SIGNIFICANT CHANGE UP (ref 0–0.2)
BASOPHILS NFR BLD AUTO: 0.8 % — SIGNIFICANT CHANGE UP (ref 0–2)
BILIRUB SERPL-MCNC: 0.5 MG/DL — SIGNIFICANT CHANGE UP (ref 0.4–2)
BUN SERPL-MCNC: 23.2 MG/DL — HIGH (ref 8–20)
CALCIUM SERPL-MCNC: 9.3 MG/DL — SIGNIFICANT CHANGE UP (ref 8.4–10.5)
CHLORIDE SERPL-SCNC: 107 MMOL/L — SIGNIFICANT CHANGE UP (ref 96–108)
CO2 SERPL-SCNC: 26 MMOL/L — SIGNIFICANT CHANGE UP (ref 22–29)
CREAT SERPL-MCNC: 0.93 MG/DL — SIGNIFICANT CHANGE UP (ref 0.5–1.3)
EGFR: 89 ML/MIN/1.73M2 — SIGNIFICANT CHANGE UP
EGFR: 89 ML/MIN/1.73M2 — SIGNIFICANT CHANGE UP
EOSINOPHIL # BLD AUTO: 0 K/UL — SIGNIFICANT CHANGE UP (ref 0–0.5)
EOSINOPHIL NFR BLD AUTO: 0 % — SIGNIFICANT CHANGE UP (ref 0–6)
GLUCOSE SERPL-MCNC: 92 MG/DL — SIGNIFICANT CHANGE UP (ref 70–99)
HCT VFR BLD CALC: 48.6 % — SIGNIFICANT CHANGE UP (ref 39–50)
HGB BLD-MCNC: 16.7 G/DL — SIGNIFICANT CHANGE UP (ref 13–17)
IMM GRANULOCYTES # BLD AUTO: 0.03 K/UL — SIGNIFICANT CHANGE UP (ref 0–0.07)
IMM GRANULOCYTES NFR BLD AUTO: 0.4 % — SIGNIFICANT CHANGE UP (ref 0–0.9)
LYMPHOCYTES # BLD AUTO: 1.68 K/UL — SIGNIFICANT CHANGE UP (ref 1–3.3)
LYMPHOCYTES NFR BLD AUTO: 21.5 % — SIGNIFICANT CHANGE UP (ref 13–44)
MCHC RBC-ENTMCNC: 31.3 PG — SIGNIFICANT CHANGE UP (ref 27–34)
MCHC RBC-ENTMCNC: 34.4 G/DL — SIGNIFICANT CHANGE UP (ref 32–36)
MCV RBC AUTO: 91 FL — SIGNIFICANT CHANGE UP (ref 80–100)
MONOCYTES # BLD AUTO: 0.88 K/UL — SIGNIFICANT CHANGE UP (ref 0–0.9)
MONOCYTES NFR BLD AUTO: 11.3 % — SIGNIFICANT CHANGE UP (ref 2–14)
NEUTROPHILS # BLD AUTO: 5.16 K/UL — SIGNIFICANT CHANGE UP (ref 1.8–7.4)
NEUTROPHILS NFR BLD AUTO: 66 % — SIGNIFICANT CHANGE UP (ref 43–77)
NRBC # BLD AUTO: 0 K/UL — SIGNIFICANT CHANGE UP (ref 0–0)
NRBC # FLD: 0 K/UL — SIGNIFICANT CHANGE UP (ref 0–0)
NRBC BLD AUTO-RTO: 0 /100 WBCS — SIGNIFICANT CHANGE UP (ref 0–0)
PLATELET # BLD AUTO: 195 K/UL — SIGNIFICANT CHANGE UP (ref 150–400)
PMV BLD: 10.4 FL — SIGNIFICANT CHANGE UP (ref 7–13)
POTASSIUM SERPL-MCNC: 4.7 MMOL/L — SIGNIFICANT CHANGE UP (ref 3.5–5.3)
POTASSIUM SERPL-SCNC: 4.7 MMOL/L — SIGNIFICANT CHANGE UP (ref 3.5–5.3)
PROT SERPL-MCNC: 6.7 G/DL — SIGNIFICANT CHANGE UP (ref 6.6–8.7)
RBC # BLD: 5.34 M/UL — SIGNIFICANT CHANGE UP (ref 4.2–5.8)
RBC # FLD: 13.2 % — SIGNIFICANT CHANGE UP (ref 10.3–14.5)
SODIUM SERPL-SCNC: 143 MMOL/L — SIGNIFICANT CHANGE UP (ref 135–145)
WBC # BLD: 7.81 K/UL — SIGNIFICANT CHANGE UP (ref 3.8–10.5)
WBC # FLD AUTO: 7.81 K/UL — SIGNIFICANT CHANGE UP (ref 3.8–10.5)

## 2025-07-11 PROCEDURE — 99285 EMERGENCY DEPT VISIT HI MDM: CPT

## 2025-07-11 PROCEDURE — 93010 ELECTROCARDIOGRAM REPORT: CPT

## 2025-07-11 PROCEDURE — 36415 COLL VENOUS BLD VENIPUNCTURE: CPT

## 2025-07-11 PROCEDURE — 99284 EMERGENCY DEPT VISIT MOD MDM: CPT | Mod: 25

## 2025-07-11 PROCEDURE — 93005 ELECTROCARDIOGRAM TRACING: CPT

## 2025-07-11 PROCEDURE — 87040 BLOOD CULTURE FOR BACTERIA: CPT

## 2025-07-11 PROCEDURE — 93280 PM DEVICE PROGR EVAL DUAL: CPT | Mod: 26

## 2025-07-11 PROCEDURE — 85025 COMPLETE CBC W/AUTO DIFF WBC: CPT

## 2025-07-11 PROCEDURE — 96374 THER/PROPH/DIAG INJ IV PUSH: CPT

## 2025-07-11 PROCEDURE — 80053 COMPREHEN METABOLIC PANEL: CPT

## 2025-07-11 RX ORDER — SULFAMETHOXAZOLE/TRIMETHOPRIM 800-160 MG
2 TABLET ORAL
Qty: 28 | Refills: 0
Start: 2025-07-11 | End: 2025-07-17

## 2025-07-11 RX ORDER — VANCOMYCIN HCL IN 5 % DEXTROSE 1.5G/250ML
1000 PLASTIC BAG, INJECTION (ML) INTRAVENOUS ONCE
Refills: 0 | Status: COMPLETED | OUTPATIENT
Start: 2025-07-11 | End: 2025-07-11

## 2025-07-11 RX ADMIN — Medication 250 MILLIGRAM(S): at 16:59

## 2025-07-11 NOTE — ED PROVIDER NOTE - CLINICAL SUMMARY MEDICAL DECISION MAKING FREE TEXT BOX
69 year old male, smoker, with HTN, hyperlipidemia, DM II, CAD s/p PRAKASH on 4/5/23, pituitary macroadenoma, LBBB with hx of near syncope / dizziness, s/p MDT DC (LBBAP) PPM (6/17/25 with Dr. Vazquez), who presents to [facility] ED c/o drainage to PPM incision site x 1 day. EP consulted for evaluation; they evaluated pacemaker at bedside. Per recommendations, patient pending labs and scheduled to received Vancomycin x1 in ED. Patient discharged on PO Bactrim. Scheduled to follow up with EP in 1 week. Return precautions discussed with patient at bedside. Patient is amenable to plan.

## 2025-07-11 NOTE — ED ADULT TRIAGE NOTE - CHIEF COMPLAINT QUOTE
pt arrives to ED c/o fluid/drainage from his pacemaker, history of stent, on Eliquis, denies N/V/D/CP/SOB

## 2025-07-11 NOTE — ED PROVIDER NOTE - ATTENDING CONTRIBUTION TO CARE
I evaluated the patient with the resident, and completed the key components of the history and physical exam. I then discussed the management plan with the resident.    70 y/o male w/PMHx of CAD, PPM placement approx 3 weeks prior with Dr. Vazquez presenting after reported small amount of discharge on the site of the pacemaker placement that he noticed earlier that day. Pt denies rash, fever/chills, nausea/vomiting, CP, or sob. EP interrogating pacemaker, recommending one time dose of vancomycin and PO Bactrim outpatient with f/u in one week. Pt w/no complaints on re-evaluation, stable for dc.

## 2025-07-11 NOTE — ED PROVIDER NOTE - NSFOLLOWUPINSTRUCTIONS_ED_ALL_ED_FT
Bactrim 2 double-strength tablets twice daily for 7days  - Outpatient follow up next in Dr. Love office for site check  - Advised pt to call office if he develops fever, chills, pain to incision site, or significant discharge    Pacemaker Implantation, Adult, Care After  After pacemaker implantation, it is common to have:  Mild pain.  Slight bruising.  Some swelling over the incision.  A slight bump where the device was placed if it was implanted in the upper chest area. Sometimes, it is possible to feel the device under the skin. This is normal.  Follow these instructions at home:  Medicines    Take over-the-counter and prescription medicines only as told by your health care provider.  If you were prescribed antibiotics, take them as told by your provider. Do not stop using the antibiotic even if you start to feel better.  Ask your provider if the medicine prescribed to you requires you to avoid driving or using machinery.  Incision site care    Two stitched incisions. One is normal. The other is red with pus and infected.  Follow instructions from your provider about how to take care of your incision. Make sure you:  Wash your hands with soap and water for at least 20 seconds before and after you change your bandage (dressing). If soap and water are not available, use hand .  Change your dressing as told by your provider.  Leave stitches (sutures), skin glue, or tape strips in place. These skin closures may need to stay in place for 2 weeks or longer. If tape strip edges start to loosen and curl up, you may trim the loose edges. Do not remove tape strips completely unless your provider tells you to do that.  Check your incision area every day for signs of infection. Check for:  More redness, swelling, or pain.  Fluid or blood.  Warmth.  Pus or a bad smell.  Do not use lotions or ointments near the incision site unless you are told to do so.  Keep the incision area clean and dry for 2–3 days after the procedure or as told by your provider. It can take several weeks for the incision site to completely heal.  Place a small pad over the incision site to protect it from clothing if needed.  Do not take baths, swim, or use a hot tub until your provider approves. Ask your provider if you may take showers. You may only be allowed to take sponge baths.  Activity    If you were given a sedative during the procedure, it can affect you for several hours. Do not drive or operate machinery until your provider says that it is safe.  Return to your normal activities as told by your provider. Ask your provider what activities are safe for you.  You may have to avoid lifting. Ask your provider how much you can safely lift.  Avoid sudden jerking, pulling, or chopping movements that pull your upper arm far away from your body. Do not make these movements for at least 6 weeks or as long as told by your provider.  Do not lift your upper arm above your shoulders for at least 6 weeks or as long as told by your provider. This includes activities like tennis, golf, or swimming.  You may go back to work when your provider says it is okay.  Electricity and magnetic fields    Avoid places or objects that have strong electric or magnetic fields, including:  Airport security checkpoints. When at the airport, let officials know that you have a pacemaker.  Metal detectors. If you must pass through a metal detector, walk through it quickly. Do not stop under the detector or stand near it.  Power plants.  Large electrical generators.  Radio frequency transmission towers, such as mobile phone and radio towers.  Do not use electric welding torches or transcutaneous electrical nerve stimulation (TENS) units.  Some devices may be safe to use if you hold them at least 1 ft (0.3 m) from your pacemaker. These devices may include power tools, lawn mowers, and speakers.  When using your mobile phone, hold it to the ear opposite the pacemaker. Do not leave your mobile phone in a pocket over the pacemaker.  If you are unsure if something is safe to use, ask your provider.  Long-term care    You may be given a home transmitter. You will be shown how to transfer data from your pacemaker to your provider.  Always tell all providers, including dentists, about your pacemaker before you have any medical procedures or tests.  Wear a medical ID bracelet or necklace that says you have a pacemaker.  Carry a pacemaker ID card with you at all times.  Your pacemaker battery will last for 5–15 years. Your provider will do routine checks to know when the battery is starting to run down. When this happens, the pacemaker will need to be replaced.  General instructions    Do not use any products that contain nicotine or tobacco. These products include cigarettes, chewing tobacco, and vaping devices, such as e-cigarettes. These can delay incision healing after surgery. If you need help quitting, ask your provider.  Follow instructions from your provider about what you may eat and drink.  Weigh yourself every day. If you suddenly gain weight, fluid may be building up in your body.  Keep all follow-up visits. Your pacemaker will be checked and reprogrammed if necessary.  Your provider may give you more instructions. Make sure you know what you can and cannot do.    Contact a health care provider if:  You suddenly gain weight.  Your legs or feet swell.  You feel like you have fast or irregular heartbeats (heart palpitations).  You have any signs of infection around your incision area.  Get help right away if:  You have chest pain.  You have trouble breathing or are short of breath.  You become extremely tired.  You are light-headed or you faint.  These symptoms may be an emergency. Get help right away. Call 911.  Do not wait to see if the symptoms will go away.  Do not drive yourself to the hospital.  This information is not intended to replace advice given to you by your health care provider. Make sure you discuss any questions you have with your health care provider.

## 2025-07-11 NOTE — ED ADULT TRIAGE NOTE - PATIENT ON (OXYGEN DELIVERY METHOD)
Medications were refilled per request. Sliding scale instructions are per endocrinology office. Please direct pharmacist to discuss with endo office the exact sliding scale instructions. room air

## 2025-07-11 NOTE — CONSULT NOTE ADULT - SUBJECTIVE AND OBJECTIVE BOX
[place] [facility]  [facility]   Office: [address] [address]  Telephone: [phone] Fax:[phone]      HPI:  Pt is a 69 year old male, smoker, with HTN, hyperlipidemia, DM II, CAD s/p PRAKASH to ramus 4/5/23 (LM, LAD and RCA with no obstruction), pituitary macroadenoma, LBBB with hx of near syncope / dizziness, s/p MDT DC (LBBAP) PPM (6/17/25 with Dr. Vazquez), who presents to [facility] ED with complaints of drainage to PPM incision site x 1 day. Pt states he woke up this morning with light brown discharge on his shirt coming from the his pacemaker incision site. Pt states the incision site previously has had no drainage. Pt states the site is nontender, and reports no fevers since the device was implanted. No other complaints at time of assessment. Denies chest pain, palpitations, dizziness, fever, chills.      EKG: AV paced rhythm.      PAST MEDICAL & SURGICAL HISTORY:  HTN (hypertension)      Hyperlipidemia      Unspecified disorder of synovium and tendon, right shoulder      Sprain of other specified parts of unspecified shoulder girdle, initial encounter      Neck mass  posterior      History of lumbar laminectomy  1995      H/O neck surgery  2001          REVIEW OF SYSTEMS:    CONSTITUTIONAL: No fever, weight loss, or fatigue  EYES: No visual disturbances  NECK: No pain or stiffness  RESPIRATORY: No cough, wheezing, chills or hemoptysis; No shortness of breath  CARDIOVASCULAR: see HPI  GASTROINTESTINAL: No abdominal or epigastric pain. No nausea, vomiting, or hematemesis; No diarrhea or constipation. No melena or hematochezia.  NEUROLOGICAL: No headaches, memory loss, loss of strength, numbness, or tremors  SKIN: discharge to LACW incision site        MEDICATIONS  (STANDING):  vancomycin  IVPB. 1000 milliGRAM(s) IV Intermittent once    MEDICATIONS  (PRN):      Allergies    No Known Allergies    Intolerances        Vital Signs Last 24 Hrs  T(C): 36.3 (11 Jul 2025 13:51), Max: 36.3 (11 Jul 2025 13:51)  T(F): 97.4 (11 Jul 2025 13:51), Max: 97.4 (11 Jul 2025 13:51)  HR: 77 (11 Jul 2025 13:51) (77 - 77)  BP: 108/68 (11 Jul 2025 13:51) (108/68 - 108/68)  BP(mean): --  RR: 20 (11 Jul 2025 13:51) (20 - 20)  SpO2: 99% (11 Jul 2025 13:51) (99% - 99%)    Parameters below as of 11 Jul 2025 13:51  Patient On (Oxygen Delivery Method): room air        Physical Exam:    Constitutional: NAD  Neck: supple, full range of motion, nontender to palpation midline  Chest wall: Minimal erythema and swelling to LACW incision site. No active drainage / dehiscence. Exposed suture to lateral incision site.    Resp: effort normal, breath sounds clear to auscultation bilaterally  Cardiac: Heart regular rate and rhythm, no murmurs, rubs, or gallops.  No edema.  Musculoskeletal: full range of motion all extremities with no pain, tenderness, swelling, or erythema    Neuro: Alert and oriented x 3        Assessment:    Pt is a 69 year old male, smoker, with HTN, hyperlipidemia, DM II, CAD s/p PRAKASH to ramus 4/5/23 (LM, LAD and RCA with no obstruction), pituitary macroadenoma, LBBB with hx of near syncope / dizziness, s/p MDT DC (LBBAP) PPM (6/17/25 with Dr. Vazquez), who presents to [facility] ED with complaints of drainage to PPM incision site x 1 day. Pt states he woke up this morning with light brown discharge on his shirt coming from the his pacemaker incision site. Pt states the incision site previously has had no drainage. Pt states the site is nontender, and reports no fevers since the device was implanted. No other complaints at time of assessment. Denies chest pain, palpitations, dizziness, fever, chills.      Plan:  1) r/o device infection following recent PPM implant  - CBC / blood cultures  - Vancomycin 1g x 1 STAT  - Pt afebrile at time of assessment with no recent fevers  - No gross drainage from site  - Device functioning appropriately  - Exposed subcutaneous suture removed by Dr. Roblero after cleaning with chlorhexidene   - Steri-strips removed  - Aquacell dressing applied  - Bactrim 2 double-strength tablets twice daily for 14 days  - Outpatient follow up next in Dr. Love office for site check  - Advised pt to call office if he develops fever, chills, pain to incision site, or significant discharge     Pt evaluated with Dr. Roblero. Discussed with ED team [place] [facility]  [facility]   Office: [address] [address]  Telephone: [phone] Fax:[phone]      HPI:  Pt is a 69 year old male, smoker, with HTN, hyperlipidemia, DM II, CAD s/p PRAKASH to ramus 4/5/23 (LM, LAD and RCA with no obstruction), pituitary macroadenoma, LBBB with hx of near syncope / dizziness, s/p MDT DC (LBBAP) PPM (6/17/25 with Dr. Vazquez), who presents to [facility] ED with complaints of drainage to PPM incision site x 1 day. Pt states he woke up this morning with light brown discharge on his shirt coming from the his pacemaker incision site. Pt states the incision site previously has had no drainage. Pt states the site is nontender, and reports no fevers since the device was implanted. No other complaints at time of assessment. Denies chest pain, palpitations, dizziness, fever, chills.      EKG: AV paced rhythm.      PAST MEDICAL & SURGICAL HISTORY:  HTN (hypertension)      Hyperlipidemia      Unspecified disorder of synovium and tendon, right shoulder      Sprain of other specified parts of unspecified shoulder girdle, initial encounter      Neck mass  posterior      History of lumbar laminectomy  1995      H/O neck surgery  2001          REVIEW OF SYSTEMS:    CONSTITUTIONAL: No fever, weight loss, or fatigue  EYES: No visual disturbances  NECK: No pain or stiffness  RESPIRATORY: No cough, wheezing, chills or hemoptysis; No shortness of breath  CARDIOVASCULAR: see HPI  GASTROINTESTINAL: No abdominal or epigastric pain. No nausea, vomiting, or hematemesis; No diarrhea or constipation. No melena or hematochezia.  NEUROLOGICAL: No headaches, memory loss, loss of strength, numbness, or tremors  SKIN: discharge to LACW incision site        MEDICATIONS  (STANDING):  vancomycin  IVPB. 1000 milliGRAM(s) IV Intermittent once    MEDICATIONS  (PRN):      Allergies    No Known Allergies    Intolerances        Vital Signs Last 24 Hrs  T(C): 36.3 (11 Jul 2025 13:51), Max: 36.3 (11 Jul 2025 13:51)  T(F): 97.4 (11 Jul 2025 13:51), Max: 97.4 (11 Jul 2025 13:51)  HR: 77 (11 Jul 2025 13:51) (77 - 77)  BP: 108/68 (11 Jul 2025 13:51) (108/68 - 108/68)  BP(mean): --  RR: 20 (11 Jul 2025 13:51) (20 - 20)  SpO2: 99% (11 Jul 2025 13:51) (99% - 99%)    Parameters below as of 11 Jul 2025 13:51  Patient On (Oxygen Delivery Method): room air        Physical Exam:    Constitutional: NAD  Neck: supple, full range of motion, nontender to palpation midline  Chest wall: Minimal erythema and swelling to LACW incision site. No active drainage / dehiscence. Exposed suture to lateral incision site.    Resp: effort normal, breath sounds clear to auscultation bilaterally  Cardiac: Heart regular rate and rhythm, no murmurs, rubs, or gallops.  No edema.  Musculoskeletal: full range of motion all extremities with no pain, tenderness, swelling, or erythema    Neuro: Alert and oriented x 3        Assessment:    Pt is a 69 year old male, smoker, with HTN, hyperlipidemia, DM II, CAD s/p PRAKASH to ramus 4/5/23 (LM, LAD and RCA with no obstruction), pituitary macroadenoma, LBBB with hx of near syncope / dizziness, s/p MDT DC (LBBAP) PPM (6/17/25 with Dr. Vazquez), who presents to [facility] ED with complaints of drainage to PPM incision site x 1 day. Pt states he woke up this morning with light brown discharge on his shirt coming from the his pacemaker incision site. Pt states the incision site previously has had no drainage. Pt states the site is nontender, and reports no fevers since the device was implanted. No other complaints at time of assessment. Denies chest pain, palpitations, dizziness, fever, chills.      Plan:  1) r/o device infection following recent PPM implant  - CBC / blood cultures  - Vancomycin 1g x 1 STAT  - Pt afebrile at time of assessment with no recent fevers  - No gross drainage from site  - Device functioning appropriately  - Exposed subcutaneous suture removed by Dr. Roblero after cleaning with chlorhexidene   - Steri-strips removed  - Aquacell dressing applied  - Bactrim 2 double-strength tablets twice daily for 7days  - Outpatient follow up next in Dr. Love office for site check  - Advised pt to call office if he develops fever, chills, pain to incision site, or significant discharge     Pt evaluated with Dr. Roblero. Discussed with ED team

## 2025-07-11 NOTE — ED PROVIDER NOTE - INTERNATIONAL TRAVEL
Detail Level: Detailed Quality 47: Advance Care Plan: Advance Care Planning discussed and documented; advance care plan or surrogate decision maker documented in the medical record. Quality 226: Preventive Care And Screening: Tobacco Use: Screening And Cessation Intervention: Patient screened for tobacco use and is an ex/non-smoker No

## 2025-07-11 NOTE — PROCEDURE NOTE - NSINFORMCONSENT_GEN_A_CORE
Benefits, risks, and possible complications of procedure explained to patient/caregiver who verbalized understanding and gave verbal consent.
7 (severe pain)

## 2025-07-11 NOTE — ED ADULT NURSE NOTE - OBJECTIVE STATEMENT
Received pt AOx4 c/o tea colored drainage coming from pacemaker he noticed this morning. Pt denies f/c, chest pain, SOB, n/v. Respirations even & unlabored. NAD. Pt made aware of plan of care and verbalized understanding.

## 2025-07-11 NOTE — ED PROVIDER NOTE - OBJECTIVE STATEMENT
69 year old male, smoker, with HTN, hyperlipidemia, DM II, CAD s/p PRAKASH on 4/5/23, pituitary macroadenoma, LBBB with hx of near syncope / dizziness, s/p MDT DC (LBBAP) PPM (6/17/25 with Dr. Vazquez), who presents to [facility] ED c/o drainage to PPM incision site x 1 day. Patient denies any recent trauma or injuries to the area. Patient denies any fevers or recent contacts. patient is endorsing no other symptoms at this time. Patient denies SOB, chest pain, chills, nausea, vomiting, diarrhea, constipation, headache, weakness, dizziness, dysuria, hematuria.

## 2025-07-11 NOTE — PROCEDURE NOTE - INTERROGATION NOTE: IMPEDANCE (OHMS)
Hospitalist Infectious Disease Hospitalist Infectious Disease Hospitalist Hospitalist Hospitalist Infectious Disease Hospitalist Infectious Disease Infectious Disease [phone]

## 2025-07-11 NOTE — CONSULT NOTE ADULT - NS ATTEND AMEND GEN_ALL_CORE FT
pt reported drainage from ppm incision site.   On evaluate there is some erythema around the incision, and mild warmth. No active drainage, and skin over pocket is normal with out swelling.  wbc normal, and afebrile.   exposed suture which was removed under stemisterile conditions.     possible superficial incisional infection, but no evidence of pocket infection or systemic infection at this point.   incision cleaned and new Acquacell dressing applied.   will treat with antibiotics as above, and ensure close outpatient followup.   We did discuss that he should monitor implant site for signs of worsening infection, and if present (particularly with drainage, pus, or progressive edema) he may require pacemaker system explantation.

## 2025-07-11 NOTE — ED PROVIDER NOTE - PHYSICAL EXAMINATION
General: Awake, alert, lying in bed in NAD  HEENT: Normocephalic, atraumatic. No scleral icterus or conjunctival injection. EOMI.  Neck:. Soft and supple.  Cardiac/chest: S1/S2 present, +PPM in placed with mild surrounding erythema, no underlying warmth or active discharge.  Resp: Lungs CTAB Symmetric chest expansion with inspiration. No accessory muscle use  Abd: Soft, non-tender, non-distended. No guarding, rebound, or rigidity.  Skin: No rashes, abrasions, or lacerations.  Extremities: No LE edema.  Neuro: AO x 4. Moves all extremities symmetrically. Motor strength and sensation grossly intact.  Psych: Appropriate mood and affect

## 2025-07-11 NOTE — PROCEDURE NOTE - ADDITIONAL PROCEDURE DETAILS
DOI: 6/17/25 with Dr. Vazquez    1 episode of NSVT on 7/6/25 lasting approximately 2 seconds in duration @ 170BPM  No sustained ventricular events recorded     %Pacing   AP: 73.4%  : 99.9%     Device functioning appropriately with stable thresholds and impedence

## 2025-07-12 DIAGNOSIS — Z95.0 PRESENCE OF CARDIAC PACEMAKER: Chronic | ICD-10-CM

## 2025-07-16 ENCOUNTER — OFFICE (OUTPATIENT)
Dept: URBAN - METROPOLITAN AREA CLINIC 113 | Facility: CLINIC | Age: 70
Setting detail: OPHTHALMOLOGY
End: 2025-07-16
Payer: MEDICARE

## 2025-07-16 DIAGNOSIS — H16.221: ICD-10-CM

## 2025-07-16 DIAGNOSIS — H43.393: ICD-10-CM

## 2025-07-16 DIAGNOSIS — H40.1132: ICD-10-CM

## 2025-07-16 PROCEDURE — 92133 CPTRZD OPH DX IMG PST SGM ON: CPT | Performed by: STUDENT IN AN ORGANIZED HEALTH CARE EDUCATION/TRAINING PROGRAM

## 2025-07-16 PROCEDURE — 92014 COMPRE OPH EXAM EST PT 1/>: CPT | Performed by: STUDENT IN AN ORGANIZED HEALTH CARE EDUCATION/TRAINING PROGRAM

## 2025-07-16 PROCEDURE — 92083 EXTENDED VISUAL FIELD XM: CPT | Performed by: STUDENT IN AN ORGANIZED HEALTH CARE EDUCATION/TRAINING PROGRAM

## 2025-07-16 ASSESSMENT — CONFRONTATIONAL VISUAL FIELD TEST (CVF)
OS_FINDINGS: FULL
OD_FINDINGS: FULL

## 2025-07-16 ASSESSMENT — KERATOMETRY
OD_K1POWER_DIOPTERS: 39.50
OD_K2POWER_DIOPTERS: 40.50
OS_K2POWER_DIOPTERS: 40.00
OS_K1POWER_DIOPTERS: 39.75
OS_AXISANGLE_DEGREES: 133
OD_AXISANGLE_DEGREES: 076

## 2025-07-16 ASSESSMENT — REFRACTION_AUTOREFRACTION
OD_SPHERE: +0.25
OS_AXIS: 087
OD_AXIS: 153
OD_CYLINDER: -1.00
OS_CYLINDER: -1.75
OS_SPHERE: +1.25

## 2025-07-16 ASSESSMENT — PACHYMETRY
OD_CT_CORRECTION: 2
OS_CT_UM: 498
OD_CT_UM: 517
OS_CT_CORRECTION: 4

## 2025-07-16 ASSESSMENT — VISUAL ACUITY
OS_BCVA: 20/20
OD_BCVA: 20/20

## 2025-07-16 ASSESSMENT — TONOMETRY: OS_IOP_MMHG: 10

## 2025-07-16 ASSESSMENT — SUPERFICIAL PUNCTATE KERATITIS (SPK): OD_SPK: ABSENT

## 2025-07-17 LAB
CULTURE RESULTS: SIGNIFICANT CHANGE UP
CULTURE RESULTS: SIGNIFICANT CHANGE UP
SPECIMEN SOURCE: SIGNIFICANT CHANGE UP
SPECIMEN SOURCE: SIGNIFICANT CHANGE UP

## 2025-07-29 ENCOUNTER — APPOINTMENT (OUTPATIENT)
Dept: ELECTROPHYSIOLOGY | Facility: CLINIC | Age: 70
End: 2025-07-29
Payer: MEDICARE

## 2025-07-29 ENCOUNTER — EMERGENCY (EMERGENCY)
Facility: HOSPITAL | Age: 70
LOS: 1 days | End: 2025-07-29
Attending: STUDENT IN AN ORGANIZED HEALTH CARE EDUCATION/TRAINING PROGRAM
Payer: MEDICARE

## 2025-07-29 VITALS
SYSTOLIC BLOOD PRESSURE: 108 MMHG | HEART RATE: 67 BPM | DIASTOLIC BLOOD PRESSURE: 62 MMHG | BODY MASS INDEX: 26.55 KG/M2 | HEIGHT: 76 IN | OXYGEN SATURATION: 98 % | WEIGHT: 218 LBS

## 2025-07-29 VITALS
DIASTOLIC BLOOD PRESSURE: 93 MMHG | RESPIRATION RATE: 18 BRPM | OXYGEN SATURATION: 98 % | WEIGHT: 225.09 LBS | SYSTOLIC BLOOD PRESSURE: 155 MMHG | HEIGHT: 76 IN | HEART RATE: 66 BPM | TEMPERATURE: 99 F

## 2025-07-29 DIAGNOSIS — Z95.0 PRESENCE OF CARDIAC PACEMAKER: Chronic | ICD-10-CM

## 2025-07-29 DIAGNOSIS — Z98.890 OTHER SPECIFIED POSTPROCEDURAL STATES: Chronic | ICD-10-CM

## 2025-07-29 PROCEDURE — 93288 INTERROG EVL PM/LDLS PM IP: CPT

## 2025-07-29 PROCEDURE — 93000 ELECTROCARDIOGRAM COMPLETE: CPT | Mod: 59

## 2025-07-29 PROCEDURE — 99284 EMERGENCY DEPT VISIT MOD MDM: CPT

## 2025-07-29 PROCEDURE — 99214 OFFICE O/P EST MOD 30 MIN: CPT

## 2025-07-29 PROCEDURE — 99283 EMERGENCY DEPT VISIT LOW MDM: CPT | Mod: GC

## 2025-07-29 RX ORDER — DOXYCYCLINE HYCLATE 100 MG
100 TABLET ORAL ONCE
Refills: 0 | Status: COMPLETED | OUTPATIENT
Start: 2025-07-29 | End: 2025-07-29

## 2025-07-29 RX ORDER — DOXYCYCLINE HYCLATE 100 MG
1 TABLET ORAL
Qty: 13 | Refills: 0
Start: 2025-07-29 | End: 2025-08-04

## 2025-07-29 RX ADMIN — Medication 100 MILLIGRAM(S): at 17:48

## 2025-08-04 ENCOUNTER — APPOINTMENT (OUTPATIENT)
Dept: ELECTROPHYSIOLOGY | Facility: CLINIC | Age: 70
End: 2025-08-04
Payer: MEDICARE

## 2025-08-04 VITALS
WEIGHT: 218 LBS | OXYGEN SATURATION: 91 % | DIASTOLIC BLOOD PRESSURE: 68 MMHG | HEIGHT: 76 IN | HEART RATE: 79 BPM | SYSTOLIC BLOOD PRESSURE: 102 MMHG | BODY MASS INDEX: 26.55 KG/M2

## 2025-08-04 PROCEDURE — 93000 ELECTROCARDIOGRAM COMPLETE: CPT

## 2025-08-04 PROCEDURE — 99213 OFFICE O/P EST LOW 20 MIN: CPT | Mod: 25

## 2025-08-04 RX ORDER — LOSARTAN POTASSIUM 25 MG/1
25 TABLET, FILM COATED ORAL DAILY
Qty: 45 | Refills: 0 | Status: DISCONTINUED | COMMUNITY
Start: 2025-07-30 | End: 2025-08-04

## 2025-08-05 RX ORDER — VALSARTAN AND HYDROCHLOROTHIAZIDE 80; 12.5 MG/1; MG/1
80-12.5 TABLET, FILM COATED ORAL DAILY
Refills: 0 | Status: ACTIVE | COMMUNITY

## 2025-08-18 ENCOUNTER — APPOINTMENT (OUTPATIENT)
Dept: ELECTROPHYSIOLOGY | Facility: CLINIC | Age: 70
End: 2025-08-18
Payer: MEDICARE

## 2025-08-18 VITALS
HEIGHT: 76 IN | OXYGEN SATURATION: 98 % | SYSTOLIC BLOOD PRESSURE: 136 MMHG | DIASTOLIC BLOOD PRESSURE: 76 MMHG | BODY MASS INDEX: 27.28 KG/M2 | HEART RATE: 65 BPM | WEIGHT: 224 LBS

## 2025-08-18 DIAGNOSIS — Z95.0 PRESENCE OF CARDIAC PACEMAKER: ICD-10-CM

## 2025-08-18 DIAGNOSIS — I44.7 LEFT BUNDLE-BRANCH BLOCK, UNSPECIFIED: ICD-10-CM

## 2025-08-18 PROCEDURE — 93000 ELECTROCARDIOGRAM COMPLETE: CPT | Mod: 59

## 2025-08-18 PROCEDURE — 99214 OFFICE O/P EST MOD 30 MIN: CPT | Mod: 25
